# Patient Record
Sex: FEMALE | Race: BLACK OR AFRICAN AMERICAN | NOT HISPANIC OR LATINO | Employment: OTHER | URBAN - METROPOLITAN AREA
[De-identification: names, ages, dates, MRNs, and addresses within clinical notes are randomized per-mention and may not be internally consistent; named-entity substitution may affect disease eponyms.]

---

## 2017-04-12 ENCOUNTER — TELEPHONE (OUTPATIENT)
Dept: INTERNAL MEDICINE | Facility: CLINIC | Age: 77
End: 2017-04-12

## 2017-04-12 NOTE — TELEPHONE ENCOUNTER
----- Message from Sarah Méndez sent at 4/12/2017  9:07 AM CDT -----  Contact: call  [phone]  Patient would like to get a referral.  Does the patient already have the specialty clinic appointment scheduled:    If yes, what date is the appointment scheduled:     Referral to what specialty:  Mammo   Reason (be specific):  annual  Does the patient want the referral with a specific physician:    Is this an [facility] or [facility] physician:  [facility]  Comments:

## 2017-04-24 ENCOUNTER — TELEPHONE (OUTPATIENT)
Dept: INTERNAL MEDICINE | Facility: CLINIC | Age: 77
End: 2017-04-24

## 2017-04-24 DIAGNOSIS — Z12.31 VISIT FOR SCREENING MAMMOGRAM: Primary | ICD-10-CM

## 2017-04-24 NOTE — TELEPHONE ENCOUNTER
----- Message from Anastasia Batista sent at 4/24/2017 11:24 AM CDT -----  Contact: Self/[phone]  Patient called want to check status on her order for mammogram. Please call, and advise            Thank you.

## 2017-05-03 ENCOUNTER — HOSPITAL ENCOUNTER (OUTPATIENT)
Dept: RADIOLOGY | Facility: OTHER | Age: 77
Discharge: HOME | End: 2017-05-03
Attending: INTERNAL MEDICINE
Payer: MEDICARE

## 2017-05-03 DIAGNOSIS — Z12.31 VISIT FOR SCREENING MAMMOGRAM: ICD-10-CM

## 2017-05-03 PROCEDURE — 77063 BREAST TOMOSYNTHESIS BI: CPT | Mod: 26,,, | Performed by: RADIOLOGY

## 2017-05-03 PROCEDURE — 77067 SCR MAMMO BI INCL CAD: CPT | Mod: 26,,, | Performed by: RADIOLOGY

## 2017-05-03 PROCEDURE — 77067 SCR MAMMO BI INCL CAD: CPT | Mod: TC

## 2017-08-09 ENCOUNTER — OFFICE VISIT (OUTPATIENT)
Dept: INFECTIOUS DISEASES | Facility: CLINIC | Age: 77
End: 2017-08-09
Payer: MEDICARE

## 2017-08-09 VITALS
TEMPERATURE: 98 F | BODY MASS INDEX: 32.3 KG/M2 | HEART RATE: 75 BPM | WEIGHT: 182.31 LBS | HEIGHT: 63 IN | DIASTOLIC BLOOD PRESSURE: 53 MMHG | SYSTOLIC BLOOD PRESSURE: 131 MMHG

## 2017-08-09 DIAGNOSIS — Z23 IMMUNIZATION DUE: ICD-10-CM

## 2017-08-09 DIAGNOSIS — Z71.84 TRAVEL ADVICE ENCOUNTER: Primary | ICD-10-CM

## 2017-08-09 PROCEDURE — 99402 PREV MED CNSL INDIV APPRX 30: CPT | Mod: S$GLB,,, | Performed by: INTERNAL MEDICINE

## 2017-08-09 PROCEDURE — 99999 PR PBB SHADOW E&M-EST. PATIENT-LVL III: CPT | Mod: PBBFAC,,, | Performed by: INTERNAL MEDICINE

## 2017-08-09 RX ORDER — ATOVAQUONE AND PROGUANIL HYDROCHLORIDE 250; 100 MG/1; MG/1
TABLET, FILM COATED ORAL
Qty: 26 TABLET | Refills: 0 | Status: SHIPPED | OUTPATIENT
Start: 2017-08-09 | End: 2018-10-10

## 2017-08-09 RX ORDER — AZITHROMYCIN 500 MG/1
TABLET, FILM COATED ORAL
Qty: 2 TABLET | Refills: 0 | Status: SHIPPED | OUTPATIENT
Start: 2017-08-09 | End: 2017-10-11

## 2017-08-09 NOTE — PROGRESS NOTES
Subjective:      Chief Complaint:   Chief Complaint   Patient presents with    Travel Consult     History of Present Illness    Patient  77 y.o. female who presents today for routine pretravel consultation.  The patient reports no past medical history.  The patient reports the following medication allergies; penicillin.  The patient reports the following food allergies; none.  The patient will be traveling with a friend.  They will travel to [facility] first for 1 week.  Then they will go to [facility], then to [place].  They will be doing a lot of traveling by bus.  The trip will last 17 days.  They will leave in September 21.  The patient will be lodging at hotels.  The patient reports that they received all their childhood vaccinations.  The patient reports receipt of the following travel related vaccinations; none.  The purpose of this trip is for vacation.    ROS    Objective:   Physical Exam   Assessment:     Pre-Travel clinic assessment    Plan:   Patient specific risks:      Patient was advised to take her home medications with her.    Destination specific risks:      -Infectious Disease risks:       Mosquito Borne pathogens:  Reviewed basic mosquito avoidance precautions including wearing long sleeve clothing and insect repellant.  Malarone was prescribed for malaria prophylaxis.  I counseled her about the risks of yellow fever vaccination given her advanced age.  After reviewing all side effects, she signed the consent form and will receive the vaccine tomorrow.     Food Borne pathogens:  Reviewed basic hand, food and water sanitation precautions.  Patient instructed to take hand  on their trip.  Will give typhoid IM and hepatitis A #1 vaccination.  Azithromycin was prescribed for use as needed for severe diarrhea.     Routine:  Will give her menactra and part of [place] falls in the meningitis belt.  Will update her tetanus vaccination.  She is up to date on pneumococcal and shingles  vaccination.    -Environmental risks:     Precautions to minimize risk/exposure to crime and motor vehicle accidents were reviewed with the patient.

## 2017-08-10 ENCOUNTER — TELEPHONE (OUTPATIENT)
Dept: INTERNAL MEDICINE | Facility: CLINIC | Age: 77
End: 2017-08-10

## 2017-08-10 ENCOUNTER — CLINICAL SUPPORT (OUTPATIENT)
Dept: INFECTIOUS DISEASES | Facility: CLINIC | Age: 77
End: 2017-08-10
Payer: MEDICARE

## 2017-08-10 DIAGNOSIS — Z23 IMMUNIZATION DUE: ICD-10-CM

## 2017-08-10 DIAGNOSIS — Z71.84 TRAVEL ADVICE ENCOUNTER: ICD-10-CM

## 2017-08-10 PROCEDURE — 90691 TYPHOID VACCINE IM: CPT | Mod: S$GLB,,, | Performed by: INTERNAL MEDICINE

## 2017-08-10 PROCEDURE — 90734 MENACWYD/MENACWYCRM VACC IM: CPT | Mod: S$GLB,,, | Performed by: INTERNAL MEDICINE

## 2017-08-10 PROCEDURE — 90714 TD VACC NO PRESV 7 YRS+ IM: CPT | Mod: S$GLB,,, | Performed by: INTERNAL MEDICINE

## 2017-08-10 PROCEDURE — 90632 HEPA VACCINE ADULT IM: CPT | Mod: S$GLB,,, | Performed by: INTERNAL MEDICINE

## 2017-08-10 PROCEDURE — 90717 YELLOW FEVER VACCINE SUBQ: CPT | Mod: S$GLB,,, | Performed by: INTERNAL MEDICINE

## 2017-08-10 PROCEDURE — 90472 IMMUNIZATION ADMIN EACH ADD: CPT | Mod: S$GLB,,, | Performed by: INTERNAL MEDICINE

## 2017-08-10 PROCEDURE — 90471 IMMUNIZATION ADMIN: CPT | Mod: S$GLB,,, | Performed by: INTERNAL MEDICINE

## 2017-08-12 RX ORDER — TRIAMTERENE AND HYDROCHLOROTHIAZIDE 37.5; 25 MG/1; MG/1
1 CAPSULE ORAL DAILY
Qty: 90 CAPSULE | Refills: 3 | Status: SHIPPED | OUTPATIENT
Start: 2017-08-12 | End: 2018-09-05 | Stop reason: SDUPTHER

## 2017-08-12 RX ORDER — LOSARTAN POTASSIUM 25 MG/1
25 TABLET ORAL DAILY
Qty: 90 TABLET | Refills: 3 | Status: SHIPPED | OUTPATIENT
Start: 2017-08-12 | End: 2018-09-04 | Stop reason: SDUPTHER

## 2017-10-11 ENCOUNTER — OFFICE VISIT (OUTPATIENT)
Dept: INTERNAL MEDICINE | Facility: CLINIC | Age: 77
End: 2017-10-11
Payer: MEDICARE

## 2017-10-11 ENCOUNTER — IMMUNIZATION (OUTPATIENT)
Dept: INTERNAL MEDICINE | Facility: CLINIC | Age: 77
End: 2017-10-11
Payer: MEDICARE

## 2017-10-11 VITALS
DIASTOLIC BLOOD PRESSURE: 72 MMHG | HEIGHT: 63 IN | SYSTOLIC BLOOD PRESSURE: 137 MMHG | HEART RATE: 66 BPM | BODY MASS INDEX: 31.89 KG/M2 | WEIGHT: 180 LBS

## 2017-10-11 DIAGNOSIS — I10 ESSENTIAL HYPERTENSION: Primary | ICD-10-CM

## 2017-10-11 DIAGNOSIS — R35.0 URINARY FREQUENCY: ICD-10-CM

## 2017-10-11 DIAGNOSIS — E78.2 HYPERLIPIDEMIA, MIXED: ICD-10-CM

## 2017-10-11 DIAGNOSIS — Z12.11 SCREENING FOR COLON CANCER: ICD-10-CM

## 2017-10-11 DIAGNOSIS — R73.9 HYPERGLYCEMIA: ICD-10-CM

## 2017-10-11 DIAGNOSIS — E55.9 VITAMIN D DEFICIENCY: ICD-10-CM

## 2017-10-11 DIAGNOSIS — Z86.39 HISTORY OF VITAMIN D DEFICIENCY: ICD-10-CM

## 2017-10-11 LAB
BILIRUB UR QL STRIP: NEGATIVE
CLARITY UR REFRACT.AUTO: CLEAR
COLOR UR AUTO: NORMAL
CREAT UR-MCNC: 26 MG/DL
GLUCOSE UR QL STRIP: NEGATIVE
HGB UR QL STRIP: NEGATIVE
KETONES UR QL STRIP: NEGATIVE
LEUKOCYTE ESTERASE UR QL STRIP: NEGATIVE
MICROALBUMIN UR DL<=1MG/L-MCNC: <2.5 UG/ML
MICROALBUMIN/CREATININE RATIO: NORMAL UG/MG
NITRITE UR QL STRIP: NEGATIVE
PH UR STRIP: 8 [PH] (ref 5–8)
PROT UR QL STRIP: NEGATIVE
SP GR UR STRIP: 1 (ref 1–1.03)
URN SPEC COLLECT METH UR: NORMAL
UROBILINOGEN UR STRIP-ACNC: NEGATIVE EU/DL

## 2017-10-11 PROCEDURE — 82570 ASSAY OF URINE CREATININE: CPT

## 2017-10-11 PROCEDURE — 87086 URINE CULTURE/COLONY COUNT: CPT

## 2017-10-11 PROCEDURE — G0009 ADMIN PNEUMOCOCCAL VACCINE: HCPCS | Mod: S$GLB,,, | Performed by: INTERNAL MEDICINE

## 2017-10-11 PROCEDURE — 99214 OFFICE O/P EST MOD 30 MIN: CPT | Mod: S$GLB,,, | Performed by: INTERNAL MEDICINE

## 2017-10-11 PROCEDURE — 81003 URINALYSIS AUTO W/O SCOPE: CPT

## 2017-10-11 PROCEDURE — 99999 PR PBB SHADOW E&M-EST. PATIENT-LVL III: CPT | Mod: PBBFAC,,, | Performed by: INTERNAL MEDICINE

## 2017-10-11 PROCEDURE — 90732 PPSV23 VACC 2 YRS+ SUBQ/IM: CPT | Mod: S$GLB,,, | Performed by: INTERNAL MEDICINE

## 2017-10-11 PROCEDURE — 99499 UNLISTED E&M SERVICE: CPT | Mod: S$GLB,,, | Performed by: INTERNAL MEDICINE

## 2017-10-11 PROCEDURE — 90662 IIV NO PRSV INCREASED AG IM: CPT | Mod: S$GLB,,, | Performed by: INTERNAL MEDICINE

## 2017-10-11 PROCEDURE — G0008 ADMIN INFLUENZA VIRUS VAC: HCPCS | Mod: S$GLB,,, | Performed by: INTERNAL MEDICINE

## 2017-10-11 RX ORDER — ALENDRONATE SODIUM 70 MG/1
TABLET ORAL
Qty: 12 TABLET | Refills: 3 | Status: SHIPPED | OUTPATIENT
Start: 2017-10-11 | End: 2018-08-29

## 2017-10-12 LAB — BACTERIA UR CULT: NO GROWTH

## 2017-10-17 ENCOUNTER — LAB VISIT (OUTPATIENT)
Dept: LAB | Facility: HOSPITAL | Age: 77
End: 2017-10-17
Attending: INTERNAL MEDICINE
Payer: MEDICARE

## 2017-10-17 DIAGNOSIS — R73.9 HYPERGLYCEMIA: ICD-10-CM

## 2017-10-17 DIAGNOSIS — I10 ESSENTIAL HYPERTENSION: ICD-10-CM

## 2017-10-17 DIAGNOSIS — Z86.39 HISTORY OF VITAMIN D DEFICIENCY: ICD-10-CM

## 2017-10-17 DIAGNOSIS — E55.9 VITAMIN D DEFICIENCY: ICD-10-CM

## 2017-10-17 DIAGNOSIS — E78.2 HYPERLIPIDEMIA, MIXED: ICD-10-CM

## 2017-10-17 LAB
25(OH)D3+25(OH)D2 SERPL-MCNC: 55 NG/ML
ALBUMIN SERPL BCP-MCNC: 3.5 G/DL
ALBUMIN SERPL BCP-MCNC: 3.5 G/DL
ALP SERPL-CCNC: 83 U/L
ALP SERPL-CCNC: 83 U/L
ALT SERPL W/O P-5'-P-CCNC: 13 U/L
ALT SERPL W/O P-5'-P-CCNC: 13 U/L
ANION GAP SERPL CALC-SCNC: 14 MMOL/L
ANION GAP SERPL CALC-SCNC: 14 MMOL/L
AST SERPL-CCNC: 17 U/L
AST SERPL-CCNC: 17 U/L
BASOPHILS # BLD AUTO: 0.07 K/UL
BASOPHILS NFR BLD: 1.3 %
BILIRUB SERPL-MCNC: 0.6 MG/DL
BILIRUB SERPL-MCNC: 0.6 MG/DL
BUN SERPL-MCNC: 12 MG/DL
BUN SERPL-MCNC: 12 MG/DL
CALCIUM SERPL-MCNC: 10.6 MG/DL
CALCIUM SERPL-MCNC: 10.6 MG/DL
CHLORIDE SERPL-SCNC: 102 MMOL/L
CHLORIDE SERPL-SCNC: 102 MMOL/L
CHOLEST SERPL-MCNC: 193 MG/DL
CHOLEST SERPL-MCNC: 193 MG/DL
CHOLEST/HDLC SERPL: 3.3 {RATIO}
CHOLEST/HDLC SERPL: 3.3 {RATIO}
CO2 SERPL-SCNC: 25 MMOL/L
CO2 SERPL-SCNC: 25 MMOL/L
CREAT SERPL-MCNC: 1.1 MG/DL
CREAT SERPL-MCNC: 1.1 MG/DL
DIFFERENTIAL METHOD: ABNORMAL
EOSINOPHIL # BLD AUTO: 0.1 K/UL
EOSINOPHIL NFR BLD: 2.3 %
ERYTHROCYTE [DISTWIDTH] IN BLOOD BY AUTOMATED COUNT: 12.9 %
EST. GFR  (AFRICAN AMERICAN): 56.4 ML/MIN/1.73 M^2
EST. GFR  (AFRICAN AMERICAN): 56.4 ML/MIN/1.73 M^2
EST. GFR  (NON AFRICAN AMERICAN): 48.9 ML/MIN/1.73 M^2
EST. GFR  (NON AFRICAN AMERICAN): 48.9 ML/MIN/1.73 M^2
ESTIMATED AVG GLUCOSE: 134 MG/DL
GLUCOSE SERPL-MCNC: 85 MG/DL
GLUCOSE SERPL-MCNC: 85 MG/DL
HBA1C MFR BLD HPLC: 6.3 %
HCT VFR BLD AUTO: 42.3 %
HDLC SERPL-MCNC: 59 MG/DL
HDLC SERPL-MCNC: 59 MG/DL
HDLC SERPL: 30.6 %
HDLC SERPL: 30.6 %
HGB BLD-MCNC: 13.5 G/DL
IMM GRANULOCYTES # BLD AUTO: 0.04 K/UL
IMM GRANULOCYTES NFR BLD AUTO: 0.7 %
LDLC SERPL CALC-MCNC: 104 MG/DL
LDLC SERPL CALC-MCNC: 104 MG/DL
LYMPHOCYTES # BLD AUTO: 1.7 K/UL
LYMPHOCYTES NFR BLD: 30.6 %
MCH RBC QN AUTO: 29 PG
MCHC RBC AUTO-ENTMCNC: 31.9 G/DL
MCV RBC AUTO: 91 FL
MONOCYTES # BLD AUTO: 0.6 K/UL
MONOCYTES NFR BLD: 9.9 %
NEUTROPHILS # BLD AUTO: 3.1 K/UL
NEUTROPHILS NFR BLD: 55.2 %
NONHDLC SERPL-MCNC: 134 MG/DL
NONHDLC SERPL-MCNC: 134 MG/DL
NRBC BLD-RTO: 0 /100 WBC
PLATELET # BLD AUTO: 288 K/UL
PMV BLD AUTO: 9.5 FL
POTASSIUM SERPL-SCNC: 4.6 MMOL/L
POTASSIUM SERPL-SCNC: 4.6 MMOL/L
PROT SERPL-MCNC: 7.9 G/DL
PROT SERPL-MCNC: 7.9 G/DL
RBC # BLD AUTO: 4.65 M/UL
SODIUM SERPL-SCNC: 141 MMOL/L
SODIUM SERPL-SCNC: 141 MMOL/L
TRIGL SERPL-MCNC: 150 MG/DL
TRIGL SERPL-MCNC: 150 MG/DL
TSH SERPL DL<=0.005 MIU/L-ACNC: 1.17 UIU/ML
TSH SERPL DL<=0.005 MIU/L-ACNC: 1.17 UIU/ML
WBC # BLD AUTO: 5.58 K/UL

## 2017-10-17 PROCEDURE — 80061 LIPID PANEL: CPT

## 2017-10-17 PROCEDURE — 84443 ASSAY THYROID STIM HORMONE: CPT

## 2017-10-17 PROCEDURE — 85025 COMPLETE CBC W/AUTO DIFF WBC: CPT

## 2017-10-17 PROCEDURE — 80053 COMPREHEN METABOLIC PANEL: CPT

## 2017-10-17 PROCEDURE — 82306 VITAMIN D 25 HYDROXY: CPT

## 2017-10-17 PROCEDURE — 36415 COLL VENOUS BLD VENIPUNCTURE: CPT

## 2017-10-17 PROCEDURE — 83036 HEMOGLOBIN GLYCOSYLATED A1C: CPT

## 2017-10-20 NOTE — PROGRESS NOTES
Subjective:       Patient ID: Kelley Cruz is a 77 y.o. female.    Chief Complaint: Follow-up    HPIPt just returned from [place] - she had a great trip and no illness.  No Cp or SOB.  She is watching her weight and has been active.   Review of Systems   Respiratory: Negative for shortness of breath (PND or orthopnea).    Cardiovascular: Negative for chest pain (arm pain or jaw pain).   Gastrointestinal: Negative for abdominal pain, diarrhea, nausea and vomiting.   Genitourinary: Negative for dysuria.   Neurological: Negative for seizures, syncope and headaches.       Objective:      Physical Exam   Constitutional: She is oriented to person, place, and time. She appears well-developed and well-nourished. No distress.   HENT:   Head: Normocephalic.   Mouth/Throat: Oropharynx is clear and moist.   Neck: Neck supple. No JVD present. No thyromegaly present.   Cardiovascular: Normal rate, regular rhythm, normal heart sounds and intact distal pulses.  Exam reveals no gallop and no friction rub.    No murmur heard.  Pulmonary/Chest: Effort normal and breath sounds normal. She has no wheezes. She has no rales.   Abdominal: Soft. Bowel sounds are normal. She exhibits no distension and no mass. There is no tenderness. There is no rebound and no guarding.   Genitourinary: Rectal exam shows guaiac negative stool.   Musculoskeletal: She exhibits no edema.   Lymphadenopathy:     She has no cervical adenopathy.   Neurological: She is alert and oriented to person, place, and time. She has normal reflexes.   Skin: Skin is warm and dry.   Psychiatric: She has a normal mood and affect. Her behavior is normal. Judgment and thought content normal.       Assessment:       1. Essential hypertension    2. Hyperlipidemia, mixed    3. Hyperglycemia    4. History of vitamin D deficiency    5. Urinary frequency    6. Vitamin D deficiency     7. Screening for colon cancer        Plan:   Essential hypertension  -     CBC auto  differential; Future; Expected date: 10/11/2017  -     Comprehensive metabolic panel; Future; Expected date: 10/11/2017  -     TSH; Future; Expected date: 10/11/2017  -     Urinalysis  -     Microalbumin/creatinine urine ratio  Controlled - continue current meds    Hyperlipidemia, mixed  -     Lipid panel; Future; Expected date: 10/11/2017    Hyperglycemia  -     Hemoglobin A1c; Future; Expected date: 10/11/2017    History of vitamin D deficiency  -     Vitamin D; Future; Expected date: 10/11/2017    Urinary frequency  -     Urine culture    Vitamin D deficiency   -     Vitamin D; Future; Expected date: 10/11/2017    Screening for colon cancer  -     Fecal Immunochemical Test (iFOBT); Future; Expected date: 10/11/2017    Other orders  -     Pneumococcal Polysaccharide Vaccine (23 Valent) (SQ/IM)  -     alendronate (FOSAMAX) 70 MG tablet; Take 1 tablet once weekly in the morning with a full glass of water on an empty stomach. Do not consume food or lie down for at least 30 minutes afterwards.  Dispense: 12 tablet; Refill: 3

## 2017-10-25 ENCOUNTER — LAB VISIT (OUTPATIENT)
Dept: LAB | Facility: HOSPITAL | Age: 77
End: 2017-10-25
Attending: INTERNAL MEDICINE
Payer: MEDICARE

## 2017-10-25 DIAGNOSIS — Z12.11 SCREENING FOR COLON CANCER: ICD-10-CM

## 2017-10-25 LAB — HEMOCCULT STL QL IA: NEGATIVE

## 2017-10-25 PROCEDURE — 82274 ASSAY TEST FOR BLOOD FECAL: CPT

## 2017-10-26 ENCOUNTER — TELEPHONE (OUTPATIENT)
Dept: INTERNAL MEDICINE | Facility: CLINIC | Age: 77
End: 2017-10-26

## 2017-10-26 NOTE — TELEPHONE ENCOUNTER
----- Message from Yina Roth MD sent at 10/26/2017  9:39 AM CDT -----  Please inform pt of no blood in the stool -  thanks.

## 2018-04-10 ENCOUNTER — PES CALL (OUTPATIENT)
Dept: ADMINISTRATIVE | Facility: CLINIC | Age: 78
End: 2018-04-10

## 2018-05-17 ENCOUNTER — TELEPHONE (OUTPATIENT)
Dept: INTERNAL MEDICINE | Facility: CLINIC | Age: 78
End: 2018-05-17

## 2018-05-17 DIAGNOSIS — Z12.31 SCREENING MAMMOGRAM, ENCOUNTER FOR: Primary | ICD-10-CM

## 2018-05-17 NOTE — TELEPHONE ENCOUNTER
----- Message from Carlos Guevara sent at 5/17/2018  8:09 AM CDT -----  Contact: Patient [phone]  Patient would like to schedule their annual mammogram appointment, please enter the order and contact the patient to schedule.     Thank you

## 2018-05-30 ENCOUNTER — HOSPITAL ENCOUNTER (OUTPATIENT)
Dept: RADIOLOGY | Facility: OTHER | Age: 78
Discharge: HOME | End: 2018-05-30
Attending: INTERNAL MEDICINE
Payer: MEDICARE

## 2018-05-30 VITALS — WEIGHT: 180 LBS | HEIGHT: 63 IN | BODY MASS INDEX: 31.89 KG/M2

## 2018-05-30 DIAGNOSIS — Z12.31 SCREENING MAMMOGRAM, ENCOUNTER FOR: ICD-10-CM

## 2018-05-30 PROCEDURE — 77067 SCR MAMMO BI INCL CAD: CPT | Mod: TC

## 2018-05-30 PROCEDURE — 77067 SCR MAMMO BI INCL CAD: CPT | Mod: 26,,, | Performed by: RADIOLOGY

## 2018-05-30 PROCEDURE — 77063 BREAST TOMOSYNTHESIS BI: CPT | Mod: 26,,, | Performed by: RADIOLOGY

## 2018-06-18 ENCOUNTER — PES CALL (OUTPATIENT)
Dept: ADMINISTRATIVE | Facility: CLINIC | Age: 78
End: 2018-06-18

## 2018-07-13 ENCOUNTER — PES CALL (OUTPATIENT)
Dept: ADMINISTRATIVE | Facility: CLINIC | Age: 78
End: 2018-07-13

## 2018-08-29 ENCOUNTER — OFFICE VISIT (OUTPATIENT)
Dept: OPTOMETRY | Facility: CLINIC | Age: 78
End: 2018-08-29

## 2018-08-29 ENCOUNTER — OFFICE VISIT (OUTPATIENT)
Dept: OPTOMETRY | Facility: CLINIC | Age: 78
End: 2018-08-29
Payer: MEDICARE

## 2018-08-29 DIAGNOSIS — H43.393 VITREOUS FLOATERS OF BOTH EYES: ICD-10-CM

## 2018-08-29 DIAGNOSIS — H52.13 MYOPIA OF BOTH EYES WITH ASTIGMATISM AND PRESBYOPIA: ICD-10-CM

## 2018-08-29 DIAGNOSIS — H52.203 MYOPIA OF BOTH EYES WITH ASTIGMATISM AND PRESBYOPIA: ICD-10-CM

## 2018-08-29 DIAGNOSIS — H52.4 MYOPIA OF BOTH EYES WITH ASTIGMATISM AND PRESBYOPIA: ICD-10-CM

## 2018-08-29 DIAGNOSIS — Z46.0 ENCOUNTER FOR FITTING OR ADJUSTMENT OF SPECTACLES OR CONTACT LENSES: Primary | ICD-10-CM

## 2018-08-29 DIAGNOSIS — Z13.5 SCREENING FOR EYE CONDITION: ICD-10-CM

## 2018-08-29 DIAGNOSIS — H25.13 NUCLEAR SCLEROSIS OF BOTH EYES: Primary | ICD-10-CM

## 2018-08-29 PROCEDURE — 92015 DETERMINE REFRACTIVE STATE: CPT | Mod: S$GLB,,, | Performed by: OPTOMETRIST

## 2018-08-29 PROCEDURE — 92014 COMPRE OPH EXAM EST PT 1/>: CPT | Mod: S$GLB,,, | Performed by: OPTOMETRIST

## 2018-08-29 PROCEDURE — 99499 UNLISTED E&M SERVICE: CPT | Mod: S$GLB,,, | Performed by: OPTOMETRIST

## 2018-08-29 PROCEDURE — 99999 PR PBB SHADOW E&M-EST. PATIENT-LVL III: CPT | Mod: PBBFAC,,, | Performed by: OPTOMETRIST

## 2018-08-29 NOTE — PATIENT INSTRUCTIONS
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  No need for cataract surgery.  Monitor.    Slight asymmetry in optic nerve cup-to-disc ratio, greater in the left eye, as noted previously.  C/D ratio stable in each eye, and intraocular pressure normal in each eye.  Monitor.     Few vitreous floaters in each eye.  No evidence of retinal etiology. Monitor.    Myopia with astigmatism in each eye, and presbyopia consistent with age.  New spectacle lens Rx issued for use in lieu of SCLs.    Wears Multifocal soft CLs.    Good contact lens fit in each eye.  Power of right lens fine as is.  Showing need for only minimal power change in the left lens per over refraction done today.     New CL Rx issued.    Recheck in one year - or prior if any problems in the interim.

## 2018-08-29 NOTE — PROGRESS NOTES
HPI     DLS:  5/5/16    Pt here for check of ocular health.  Pt without visual complaints today   OU.     Patient's age: 78 y.o.  Occupation: Retired  Approximate date of last eye examination:  5/5/16  Name of last eye doctor seen: Dr Barth  City/State: [place]  Wears glasses? yes     If yes, wears  Full-time or part-time?  Part time  Present glasses are: Bifocal, SV Distance, SV Reading?  SV reading   Approximate age of present glasses:  unsure   Got new glasses following last exam, or subsequently?:  unsure   Any problem with [facility] with glasses?  no  Wears CLs?:  yes           If yes:              Type of CL worn:  unsure              Wears full-time or part-time:  Full time               Sleeps with contact lenses:  no               CL Solution used:  Dagmar               How often replace CLs:  Every 30 days              Any problem with [facility] with CLs?  no              Has patient read and signed the contact lens fee information   document? no  Headaches?  no  Eye pain/discomfort?  no                                                                                     Flashes?  no  Floaters?  no  Diplopia/Double vision?  no  Patient's Ocular History:         Any eye surgeries? no         Any eye injury?  no         Any treatment for eye disease?  no  Family history of eye disease?  Mother-glaucoma  Significant patient medical history:         1. Diabetes?  no       If yes, IDDM or NIDDM? n/a   2. HBP?  no                 ! OTC eyedrops currently using:  no   ! Prescription eye meds currently using:  no   ! Any history of allergy/adverse reaction to any eye meds used   previously?  no    ! Any history of allergy/adverse reaction to eyedrops used during prior   eye exam(s)? no    ! Any history of allergy/adverse reaction to Novacaine or similar meds?   no   ! Any history of allergy/adverse reaction to Epinephrine or similar meds?   no    ! Patient okay with use of anesthetic eyedrops to check eye pressure?    yes         ! Patient okay with use of eyedrops to dilate pupils today?  yes   !  Allergies/Medications/Medical History/Family History reviewed today?    yes      PD =     Desired reading distance =    Approx computer distance =                                                                 Last edited by Sandee Montes MA on 8/29/2018  4:20 PM.   (History)            Assessment /Plan     For exam results, see Encounter Report.    1. Nuclear sclerosis of both eyes     2. Vitreous floaters of both eyes     3. Screening for eye condition     4. 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  No need for cataract surgery.  Monitor.    Slight asymmetry in optic nerve cup-to-disc ratio, greater in the left eye, as noted previously.  C/D ratio stable in each eye, and intraocular pressure normal in each eye.  Monitor.     Few vitreous floaters in each eye.  No evidence of retinal etiology. Monitor.    Myopia with astigmatism in each eye, and presbyopia consistent with age.  New spectacle lens Rx issued for use in lieu of SCLs.    Wears Multifocal soft CLs.    Good contact lens fit in each eye.  Power of right lens fine as is.  Showing need for only minimal power change in the left lens per over refraction done today.     New CL Rx issued.    Recheck in one year - or prior if any problems in the interim.

## 2018-08-29 NOTE — PROGRESS NOTES
HPI     Contact Lens Follow Up      Additional comments: Patient in today for contact lens follow-up with   general eye examination.  Refer to additional patient encounter notes   dated 08/29/2018.                Comments     Patient in today for contact lens follow-up with general eye examination.    Refer to additional patient encounter notes dated 08/29/2018            Last edited by Gurwinder Barth, OD on 8/29/2018  4:29 PM. (History)            Assessment /Plan     For exam results, see Encounter Report.    1. 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  No need for cataract surgery.  Monitor.    Slight asymmetry in optic nerve cup-to-disc ratio, greater in the left eye, as noted previously.  C/D ratio stable in each eye, and intraocular pressure normal in each eye.  Monitor.     Few vitreous floaters in each eye.  No evidence of retinal etiology. Monitor.    Myopia with astigmatism in each eye, and presbyopia consistent with age.  New spectacle lens Rx issued for use in lieu of SCLs.    Wears Multifocal soft CLs.    Good contact lens fit in each eye.  Power of right lens fine as is.  Showing need for only minimal power change in the left lens per over refraction done today.     New CL Rx issued.    Recheck in one year - or prior if any problems in the interim.

## 2018-09-04 RX ORDER — LOSARTAN POTASSIUM 25 MG/1
25 TABLET ORAL DAILY
Qty: 90 TABLET | Refills: 1 | Status: SHIPPED | OUTPATIENT
Start: 2018-09-04 | End: 2019-02-25 | Stop reason: SDUPTHER

## 2018-09-05 RX ORDER — TRIAMTERENE AND HYDROCHLOROTHIAZIDE 37.5; 25 MG/1; MG/1
1 CAPSULE ORAL DAILY
Qty: 90 CAPSULE | Refills: 2 | Status: SHIPPED | OUTPATIENT
Start: 2018-09-05 | End: 2019-03-08 | Stop reason: SDUPTHER

## 2018-09-05 NOTE — TELEPHONE ENCOUNTER
Left a message for the patient to call the office back.   To confirm annual physical appointment on 10/08/18  At 11:30am    Please Advise  Thank You

## 2018-09-26 ENCOUNTER — TELEPHONE (OUTPATIENT)
Dept: INTERNAL MEDICINE | Facility: CLINIC | Age: 78
End: 2018-09-26

## 2018-10-10 ENCOUNTER — IMMUNIZATION (OUTPATIENT)
Dept: INTERNAL MEDICINE | Facility: CLINIC | Age: 78
End: 2018-10-10
Payer: MEDICARE

## 2018-10-10 ENCOUNTER — LAB VISIT (OUTPATIENT)
Dept: LAB | Facility: HOSPITAL | Age: 78
End: 2018-10-10
Attending: INTERNAL MEDICINE
Payer: MEDICARE

## 2018-10-10 ENCOUNTER — OFFICE VISIT (OUTPATIENT)
Dept: INTERNAL MEDICINE | Facility: CLINIC | Age: 78
End: 2018-10-10
Payer: MEDICARE

## 2018-10-10 VITALS
HEIGHT: 63 IN | OXYGEN SATURATION: 95 % | BODY MASS INDEX: 32.03 KG/M2 | DIASTOLIC BLOOD PRESSURE: 80 MMHG | HEART RATE: 72 BPM | TEMPERATURE: 98 F | SYSTOLIC BLOOD PRESSURE: 122 MMHG | WEIGHT: 180.75 LBS

## 2018-10-10 DIAGNOSIS — R73.9 HYPERGLYCEMIA: ICD-10-CM

## 2018-10-10 DIAGNOSIS — Z12.11 ENCOUNTER FOR SCREENING COLONOSCOPY: ICD-10-CM

## 2018-10-10 DIAGNOSIS — M81.0 OSTEOPOROSIS, UNSPECIFIED OSTEOPOROSIS TYPE, UNSPECIFIED PATHOLOGICAL FRACTURE PRESENCE: ICD-10-CM

## 2018-10-10 DIAGNOSIS — I10 ESSENTIAL HYPERTENSION: Primary | ICD-10-CM

## 2018-10-10 DIAGNOSIS — I10 ESSENTIAL HYPERTENSION: ICD-10-CM

## 2018-10-10 DIAGNOSIS — E55.9 MILD VITAMIN D DEFICIENCY: ICD-10-CM

## 2018-10-10 DIAGNOSIS — E78.5 HYPERLIPIDEMIA, UNSPECIFIED HYPERLIPIDEMIA TYPE: ICD-10-CM

## 2018-10-10 LAB
25(OH)D3+25(OH)D2 SERPL-MCNC: 34 NG/ML
ALBUMIN SERPL BCP-MCNC: 3.8 G/DL
ALP SERPL-CCNC: 73 U/L
ALT SERPL W/O P-5'-P-CCNC: 14 U/L
ANION GAP SERPL CALC-SCNC: 10 MMOL/L
AST SERPL-CCNC: 17 U/L
BASOPHILS # BLD AUTO: 0.06 K/UL
BASOPHILS NFR BLD: 0.9 %
BILIRUB SERPL-MCNC: 0.6 MG/DL
BUN SERPL-MCNC: 13 MG/DL
CALCIUM SERPL-MCNC: 10.4 MG/DL
CHLORIDE SERPL-SCNC: 101 MMOL/L
CHOLEST SERPL-MCNC: 208 MG/DL
CHOLEST/HDLC SERPL: 2.8 {RATIO}
CO2 SERPL-SCNC: 28 MMOL/L
CREAT SERPL-MCNC: 0.8 MG/DL
DIFFERENTIAL METHOD: NORMAL
EOSINOPHIL # BLD AUTO: 0.1 K/UL
EOSINOPHIL NFR BLD: 1.7 %
ERYTHROCYTE [DISTWIDTH] IN BLOOD BY AUTOMATED COUNT: 13.2 %
EST. GFR  (AFRICAN AMERICAN): >60 ML/MIN/1.73 M^2
EST. GFR  (NON AFRICAN AMERICAN): >60 ML/MIN/1.73 M^2
GLUCOSE SERPL-MCNC: 70 MG/DL
HCT VFR BLD AUTO: 42.8 %
HDLC SERPL-MCNC: 73 MG/DL
HDLC SERPL: 35.1 %
HGB BLD-MCNC: 13.8 G/DL
IMM GRANULOCYTES # BLD AUTO: 0.02 K/UL
IMM GRANULOCYTES NFR BLD AUTO: 0.3 %
LDLC SERPL CALC-MCNC: 107 MG/DL
LYMPHOCYTES # BLD AUTO: 2 K/UL
LYMPHOCYTES NFR BLD: 28.8 %
MCH RBC QN AUTO: 30.9 PG
MCHC RBC AUTO-ENTMCNC: 32.2 G/DL
MCV RBC AUTO: 96 FL
MONOCYTES # BLD AUTO: 0.8 K/UL
MONOCYTES NFR BLD: 10.8 %
NEUTROPHILS # BLD AUTO: 4 K/UL
NEUTROPHILS NFR BLD: 57.5 %
NONHDLC SERPL-MCNC: 135 MG/DL
NRBC BLD-RTO: 0 /100 WBC
PLATELET # BLD AUTO: 283 K/UL
PMV BLD AUTO: 10.5 FL
POTASSIUM SERPL-SCNC: 3.8 MMOL/L
PROT SERPL-MCNC: 7.7 G/DL
RBC # BLD AUTO: 4.47 M/UL
SODIUM SERPL-SCNC: 139 MMOL/L
TRIGL SERPL-MCNC: 140 MG/DL
TSH SERPL DL<=0.005 MIU/L-ACNC: 1.05 UIU/ML
WBC # BLD AUTO: 7.02 K/UL

## 2018-10-10 PROCEDURE — 1101F PT FALLS ASSESS-DOCD LE1/YR: CPT | Mod: CPTII,,, | Performed by: INTERNAL MEDICINE

## 2018-10-10 PROCEDURE — 83036 HEMOGLOBIN GLYCOSYLATED A1C: CPT

## 2018-10-10 PROCEDURE — 36415 COLL VENOUS BLD VENIPUNCTURE: CPT | Mod: PO

## 2018-10-10 PROCEDURE — 3079F DIAST BP 80-89 MM HG: CPT | Mod: CPTII,,, | Performed by: INTERNAL MEDICINE

## 2018-10-10 PROCEDURE — 81001 URINALYSIS AUTO W/SCOPE: CPT

## 2018-10-10 PROCEDURE — 99999 PR PBB SHADOW E&M-EST. PATIENT-LVL IV: CPT | Mod: PBBFAC,,, | Performed by: INTERNAL MEDICINE

## 2018-10-10 PROCEDURE — 3074F SYST BP LT 130 MM HG: CPT | Mod: CPTII,,, | Performed by: INTERNAL MEDICINE

## 2018-10-10 PROCEDURE — 99214 OFFICE O/P EST MOD 30 MIN: CPT | Mod: PBBFAC,25,PO | Performed by: INTERNAL MEDICINE

## 2018-10-10 PROCEDURE — 90662 IIV NO PRSV INCREASED AG IM: CPT | Mod: PBBFAC,PO

## 2018-10-10 PROCEDURE — 80053 COMPREHEN METABOLIC PANEL: CPT

## 2018-10-10 PROCEDURE — 80061 LIPID PANEL: CPT

## 2018-10-10 PROCEDURE — 85025 COMPLETE CBC W/AUTO DIFF WBC: CPT

## 2018-10-10 PROCEDURE — 84443 ASSAY THYROID STIM HORMONE: CPT

## 2018-10-10 PROCEDURE — 82043 UR ALBUMIN QUANTITATIVE: CPT

## 2018-10-10 PROCEDURE — 82306 VITAMIN D 25 HYDROXY: CPT

## 2018-10-10 PROCEDURE — 99214 OFFICE O/P EST MOD 30 MIN: CPT | Mod: S$PBB,,, | Performed by: INTERNAL MEDICINE

## 2018-10-10 NOTE — PROGRESS NOTES
Administered high dose flu vaccine to the left deltoid(0.5ml) tolerated well, no c/o pain or redness to site, no adverse reaction pass 15min wait time.

## 2018-10-11 LAB
ALBUMIN/CREAT UR: NORMAL UG/MG
BACTERIA #/AREA URNS AUTO: NORMAL /HPF
BILIRUB UR QL STRIP: NEGATIVE
CLARITY UR REFRACT.AUTO: CLEAR
COLOR UR AUTO: YELLOW
CREAT UR-MCNC: 56 MG/DL
ESTIMATED AVG GLUCOSE: 128 MG/DL
GLUCOSE UR QL STRIP: NEGATIVE
HBA1C MFR BLD HPLC: 6.1 %
HGB UR QL STRIP: NEGATIVE
KETONES UR QL STRIP: NEGATIVE
LEUKOCYTE ESTERASE UR QL STRIP: ABNORMAL
MICROALBUMIN UR DL<=1MG/L-MCNC: <2.5 UG/ML
MICROSCOPIC COMMENT: NORMAL
NITRITE UR QL STRIP: NEGATIVE
PH UR STRIP: 7 [PH] (ref 5–8)
PROT UR QL STRIP: NEGATIVE
RBC #/AREA URNS AUTO: 1 /HPF (ref 0–4)
SP GR UR STRIP: 1.01 (ref 1–1.03)
SQUAMOUS #/AREA URNS AUTO: 1 /HPF
URN SPEC COLLECT METH UR: ABNORMAL
UROBILINOGEN UR STRIP-ACNC: NEGATIVE EU/DL
WBC #/AREA URNS AUTO: 1 /HPF (ref 0–5)

## 2018-10-19 ENCOUNTER — HOSPITAL ENCOUNTER (OUTPATIENT)
Dept: RADIOLOGY | Facility: CLINIC | Age: 78
Discharge: HOME | End: 2018-10-19
Attending: INTERNAL MEDICINE
Payer: MEDICARE

## 2018-10-19 DIAGNOSIS — M81.0 OSTEOPOROSIS, UNSPECIFIED OSTEOPOROSIS TYPE, UNSPECIFIED PATHOLOGICAL FRACTURE PRESENCE: ICD-10-CM

## 2018-10-19 PROCEDURE — 77080 DXA BONE DENSITY AXIAL: CPT | Mod: 26,,, | Performed by: INTERNAL MEDICINE

## 2018-10-19 PROCEDURE — 77080 DXA BONE DENSITY AXIAL: CPT | Mod: TC

## 2018-10-21 NOTE — PROGRESS NOTES
Subjective:       Patient ID: Kelley Cruz is a 78 y.o. female.    Chief Complaint: Annual Exam    HPIPt is feeling well - very active - walking a few miles per day. No CP or SOB.  Review of Systems   Respiratory: Negative for shortness of breath (PND or orthopnea).    Cardiovascular: Negative for chest pain (arm pain or jaw pain).   Gastrointestinal: Negative for abdominal pain, diarrhea, nausea and vomiting.   Genitourinary: Negative for dysuria.   Neurological: Negative for seizures, syncope and headaches.       Objective:      Physical Exam   Constitutional: She is oriented to person, place, and time. She appears well-developed and well-nourished. No distress.   HENT:   Head: Normocephalic.   Mouth/Throat: Oropharynx is clear and moist.   Eyes: EOM are normal. Pupils are equal, round, and reactive to light.   Neck: Neck supple. No JVD present. No thyromegaly present.   Cardiovascular: Normal rate, regular rhythm, normal heart sounds and intact distal pulses. Exam reveals no gallop and no friction rub.   No murmur heard.  Pulmonary/Chest: Effort normal and breath sounds normal. She has no wheezes. She has no rales.   Breasts: No masses, discharge or adenopathy bilaterally     Abdominal: Soft. Bowel sounds are normal. She exhibits no distension and no mass. There is no tenderness. There is no rebound and no guarding.   Genitourinary: Rectal exam shows guaiac negative stool.   Musculoskeletal: She exhibits no edema.   Lymphadenopathy:     She has no cervical adenopathy.   Neurological: She is alert and oriented to person, place, and time. She has normal reflexes.   Skin: Skin is warm and dry.   Psychiatric: She has a normal mood and affect. Her behavior is normal. Judgment and thought content normal.       Assessment:       1. Essential hypertension    2. Hyperglycemia    3. Mild vitamin D deficiency    4. Hyperlipidemia, unspecified hyperlipidemia type    5. Osteoporosis, unspecified osteoporosis type,  unspecified pathological fracture presence    6. Encounter for screening colonoscopy        Plan:   Essential hypertension  -     CBC auto differential; Future; Expected date: 10/10/2018  -     Comprehensive metabolic panel; Future; Expected date: 10/10/2018  -     TSH; Future; Expected date: 10/10/2018  -     Urinalysis  -     Microalbumin/creatinine urine ratio  Controlled - continue current meds    Hyperglycemia  -     Hemoglobin A1c; Future; Expected date: 10/10/2018    Mild vitamin D deficiency  -     Vitamin D; Future; Expected date: 10/10/2018    Hyperlipidemia, unspecified hyperlipidemia type  -     Lipid panel; Future; Expected date: 10/10/2018    Osteoporosis, unspecified osteoporosis type, unspecified pathological fracture presence  -     DXA Bone Density Spine And Hip; Future; Expected date: 10/10/2018  -     Ambulatory consult to Endocrinology    Encounter for screening colonoscopy  -     Case request GI: COLONOSCOPY    Other orders  -     Urinalysis Microscopic

## 2018-10-22 ENCOUNTER — TELEPHONE (OUTPATIENT)
Dept: INTERNAL MEDICINE | Facility: CLINIC | Age: 78
End: 2018-10-22

## 2018-10-22 DIAGNOSIS — E78.2 HYPERLIPIDEMIA, MIXED: Primary | ICD-10-CM

## 2018-10-22 NOTE — PROGRESS NOTES
Please inform pt of normal lab results, except cholesterol slightly high - please continue walking and diet and we will recheck in 6 months.

## 2018-10-22 NOTE — TELEPHONE ENCOUNTER
Spoke with patient, informed her of normal lab results with the exception of slightly high cholesterol, advised to continue with diet and exercise, walking, and will recheck labs in 6 months

## 2019-01-15 ENCOUNTER — OFFICE VISIT (OUTPATIENT)
Dept: ORTHOPEDICS | Facility: CLINIC | Age: 79
End: 2019-01-15
Payer: MEDICARE

## 2019-01-15 ENCOUNTER — OFFICE VISIT (OUTPATIENT)
Dept: INTERNAL MEDICINE | Facility: CLINIC | Age: 79
End: 2019-01-15
Payer: MEDICARE

## 2019-01-15 ENCOUNTER — HOSPITAL ENCOUNTER (OUTPATIENT)
Dept: RADIOLOGY | Facility: HOSPITAL | Age: 79
Discharge: HOME | End: 2019-01-15
Attending: INTERNAL MEDICINE
Payer: MEDICARE

## 2019-01-15 VITALS
HEART RATE: 74 BPM | BODY MASS INDEX: 32.43 KG/M2 | WEIGHT: 183 LBS | DIASTOLIC BLOOD PRESSURE: 78 MMHG | HEIGHT: 63 IN | SYSTOLIC BLOOD PRESSURE: 141 MMHG

## 2019-01-15 VITALS
DIASTOLIC BLOOD PRESSURE: 87 MMHG | HEART RATE: 59 BPM | BODY MASS INDEX: 32.43 KG/M2 | OXYGEN SATURATION: 97 % | SYSTOLIC BLOOD PRESSURE: 133 MMHG | HEIGHT: 63 IN | WEIGHT: 183 LBS

## 2019-01-15 DIAGNOSIS — S52.601A RADIUS AND ULNA DISTAL FRACTURE, RIGHT, CLOSED, INITIAL ENCOUNTER: Primary | ICD-10-CM

## 2019-01-15 DIAGNOSIS — S52.501A RADIUS AND ULNA DISTAL FRACTURE, RIGHT, CLOSED, INITIAL ENCOUNTER: Primary | ICD-10-CM

## 2019-01-15 DIAGNOSIS — M25.531 WRIST PAIN, ACUTE, RIGHT: ICD-10-CM

## 2019-01-15 DIAGNOSIS — S52.614A CLOSED NONDISPLACED FRACTURE OF STYLOID PROCESS OF RIGHT ULNA, INITIAL ENCOUNTER: ICD-10-CM

## 2019-01-15 DIAGNOSIS — S69.91XA INJURY OF RIGHT WRIST, INITIAL ENCOUNTER: ICD-10-CM

## 2019-01-15 DIAGNOSIS — S69.91XA INJURY OF RIGHT WRIST, INITIAL ENCOUNTER: Primary | ICD-10-CM

## 2019-01-15 DIAGNOSIS — S52.101A CLOSED FRACTURE OF PROXIMAL END OF RIGHT RADIUS, UNSPECIFIED FRACTURE MORPHOLOGY, INITIAL ENCOUNTER: ICD-10-CM

## 2019-01-15 PROCEDURE — 73090 X-RAY EXAM OF FOREARM: CPT | Mod: 50,TC,FY,PO

## 2019-01-15 PROCEDURE — 3074F PR MOST RECENT SYSTOLIC BLOOD PRESSURE < 130 MM HG: ICD-10-PCS | Mod: CPTII,S$GLB,, | Performed by: PHYSICIAN ASSISTANT

## 2019-01-15 PROCEDURE — 3288F FALL RISK ASSESSMENT DOCD: CPT | Mod: CPTII,S$GLB,, | Performed by: INTERNAL MEDICINE

## 2019-01-15 PROCEDURE — 99999 PR PBB SHADOW E&M-EST. PATIENT-LVL III: CPT | Mod: PBBFAC,,, | Performed by: INTERNAL MEDICINE

## 2019-01-15 PROCEDURE — 73090 X-RAY EXAM OF FOREARM: CPT | Mod: 26,50,, | Performed by: RADIOLOGY

## 2019-01-15 PROCEDURE — 1100F PTFALLS ASSESS-DOCD GE2>/YR: CPT | Mod: CPTII,S$GLB,, | Performed by: INTERNAL MEDICINE

## 2019-01-15 PROCEDURE — 99203 PR OFFICE/OUTPT VISIT, NEW, LEVL III, 30-44 MIN: ICD-10-PCS | Mod: S$GLB,,, | Performed by: PHYSICIAN ASSISTANT

## 2019-01-15 PROCEDURE — 73110 X-RAY EXAM OF WRIST: CPT | Mod: 26,RT,, | Performed by: RADIOLOGY

## 2019-01-15 PROCEDURE — 1101F PT FALLS ASSESS-DOCD LE1/YR: CPT | Mod: CPTII,S$GLB,, | Performed by: PHYSICIAN ASSISTANT

## 2019-01-15 PROCEDURE — 73090 XR FOREARM BILATERAL: ICD-10-PCS | Mod: 26,50,, | Performed by: RADIOLOGY

## 2019-01-15 PROCEDURE — 99203 OFFICE O/P NEW LOW 30 MIN: CPT | Mod: S$GLB,,, | Performed by: PHYSICIAN ASSISTANT

## 2019-01-15 PROCEDURE — 99213 PR OFFICE/OUTPT VISIT, EST, LEVL III, 20-29 MIN: ICD-10-PCS | Mod: S$GLB,,, | Performed by: INTERNAL MEDICINE

## 2019-01-15 PROCEDURE — 73110 XR WRIST COMPLETE 3 VIEWS RIGHT: ICD-10-PCS | Mod: 26,RT,, | Performed by: RADIOLOGY

## 2019-01-15 PROCEDURE — 99999 PR PBB SHADOW E&M-EST. PATIENT-LVL III: CPT | Mod: PBBFAC,,, | Performed by: PHYSICIAN ASSISTANT

## 2019-01-15 PROCEDURE — 3078F PR MOST RECENT DIASTOLIC BLOOD PRESSURE < 80 MM HG: ICD-10-PCS | Mod: CPTII,S$GLB,, | Performed by: PHYSICIAN ASSISTANT

## 2019-01-15 PROCEDURE — 73110 X-RAY EXAM OF WRIST: CPT | Mod: TC,FY,PO,RT

## 2019-01-15 PROCEDURE — 3078F DIAST BP <80 MM HG: CPT | Mod: CPTII,S$GLB,, | Performed by: PHYSICIAN ASSISTANT

## 2019-01-15 PROCEDURE — 3288F PR FALLS RISK ASSESSMENT DOCUMENTED: ICD-10-PCS | Mod: CPTII,S$GLB,, | Performed by: INTERNAL MEDICINE

## 2019-01-15 PROCEDURE — 1101F PR PT FALLS ASSESS DOC 0-1 FALLS W/OUT INJ PAST YR: ICD-10-PCS | Mod: CPTII,S$GLB,, | Performed by: PHYSICIAN ASSISTANT

## 2019-01-15 PROCEDURE — 3075F SYST BP GE 130 - 139MM HG: CPT | Mod: CPTII,S$GLB,, | Performed by: INTERNAL MEDICINE

## 2019-01-15 PROCEDURE — 99999 PR PBB SHADOW E&M-EST. PATIENT-LVL III: ICD-10-PCS | Mod: PBBFAC,,, | Performed by: INTERNAL MEDICINE

## 2019-01-15 PROCEDURE — 99999 PR PBB SHADOW E&M-EST. PATIENT-LVL III: ICD-10-PCS | Mod: PBBFAC,,, | Performed by: PHYSICIAN ASSISTANT

## 2019-01-15 PROCEDURE — 3075F PR MOST RECENT SYSTOLIC BLOOD PRESS GE 130-139MM HG: ICD-10-PCS | Mod: CPTII,S$GLB,, | Performed by: INTERNAL MEDICINE

## 2019-01-15 PROCEDURE — 3074F SYST BP LT 130 MM HG: CPT | Mod: CPTII,S$GLB,, | Performed by: PHYSICIAN ASSISTANT

## 2019-01-15 PROCEDURE — 3079F DIAST BP 80-89 MM HG: CPT | Mod: CPTII,S$GLB,, | Performed by: INTERNAL MEDICINE

## 2019-01-15 PROCEDURE — 3079F PR MOST RECENT DIASTOLIC BLOOD PRESSURE 80-89 MM HG: ICD-10-PCS | Mod: CPTII,S$GLB,, | Performed by: INTERNAL MEDICINE

## 2019-01-15 PROCEDURE — 99213 OFFICE O/P EST LOW 20 MIN: CPT | Mod: S$GLB,,, | Performed by: INTERNAL MEDICINE

## 2019-01-15 PROCEDURE — 1100F PR PT FALLS ASSESS DOC 2+ FALLS/FALL W/INJURY/YR: ICD-10-PCS | Mod: CPTII,S$GLB,, | Performed by: INTERNAL MEDICINE

## 2019-01-15 NOTE — PROGRESS NOTES
SUBJECTIVE:     Chief Complaint & History of Present Illness:  Kelley Cruz is a New patient 78 y.o. female who is seen here today with a complaint of  right wrist pain .  Patient is here today as a referral from Internal Medicine for sudden onset pain in the right wrist following a fall today developed immediate pain and swelling in and about the hand and wrist. X-rays taken by primary care demonstrate fractures of both the radius and the ulna  minimally displaced  On a scale of 1-10, with 10 being worst pain imaginable, he rates this pain as 2 on good days and 5 on bad days.  she describes the pain as tender.    Review of patient's allergies indicates:   Allergen Reactions    Codeine      Other reaction(s): Headache    Pcn  [penicillins]      Other reaction(s): Nausea         Current Outpatient Medications   Medication Sig Dispense Refill    losartan (COZAAR) 25 MG tablet TAKE 1 TABLET (25 MG TOTAL) BY MOUTH ONCE DAILY. 90 tablet 1    multivitamin (MULTIVITAMIN) per tablet Take 1 tablet by mouth once daily.      triamterene-hydrochlorothiazide 37.5-25 mg (DYAZIDE) 37.5-25 mg per capsule TAKE 1 CAPSULE BY MOUTH ONCE DAILY. 90 capsule 2     No current facility-administered medications for this visit.        Past Medical History:   Diagnosis Date    Cataract     Hypertension     Keloid cicatrix     Osteoporosis        Past Surgical History:   Procedure Laterality Date    HYSTERECTOMY      TAHBSO for benign pelvic cystis mass    REPAIR-HERNIA-INGUINAL Right 11/4/2015    Performed by Joshua Goldberg, MD at [facility] OR [address] [place] [address]       Vital Signs (Most Recent)  Vitals:    01/15/19 1552   BP: (!) 141/78   Pulse: 74           Review of Systems:  ROS:  Constitutional: no fever or chills  Eyes: no visual changes  ENT: no nasal congestion or sore throat  Respiratory: no cough or shortness of breath  Cardiovascular: no chest pain or palpitations  Gastrointestinal: no nausea or vomiting, tolerating  "diet  Genitourinary: no hematuria or dysuria  Integument/Breast: no rash or pruritis  Hematologic/Lymphatic: no easy bruising or lymphadenopathy  Musculoskeletal: no arthralgias or myalgias  Neurological: no seizures or tremors  Behavioral/Psych: no auditory or visual hallucinations  Endocrine: no heat or cold intolerance                OBJECTIVE:     PHYSICAL EXAM:  Height: 5' 3" (160 cm) Weight: 83 kg (182 lb 15.7 oz), General Appearance: Well nourished, well developed, in no acute distress.  Neurological: Mood & affect are normal.  right wrist   History of injury: fall  Pain: Description: moderate, intermittent and worse if dependent  Quality: sharp and stabbing  Location: wrist, radial and ulnar  Exacerbating factors: activity  Alleviating factors: ice, OTC NSAIDS and rest  Neurological complaints: none  Mass/Swelling:  Moderate  Condition of skin:intact  Hand Exam: soft tissue tenderness and swelling at the Wrist, reduced range of motion of Wrist, negative Phalen, negative Tinel and negative Cozen    Wrist Exam: restricted due to recent injury      RADIOGRAPHS:  X-rays taken today films reviewed by me demonstrate an oblique fracture through the distal diametaphyseal is of the right radius; this may extend to the articular surface.  Ulnar styloid fracture is also present    ASSESSMENT/PLAN:     Plan: We discussed with the patient at length all the different treatment options available for her wrist, including anti-inflammatories, acetaminophen, rest, ice, physical therapy to include strengthening, range of motion exercise ultrasound, splinting,  occasional cortisone injections for temporary relief,  or possible surgical interventions.  Will place the patient in Exos splint for support and protection continue ice and elevation she is scheduled to follow-up with the hand service in 2 days  "

## 2019-01-15 NOTE — PROGRESS NOTES
REASON FOR VISIT:  This is a 78-year-old female who is here regarding pain and   injury involving her right wrist, forearm.  Yesterday around 1:00 p.m., she had   a fall in a parking lot of  [employer].  She was walking on an uneven surface,   lost her footing, went forward.  Her left wrist and forearm hit a pole and   actually broke the fall.  She just landed on left knee, but was able to get up.    She went home, has been putting ice on the forearm and wrist and wrapped it.    She has a bit more swelling today and there is an area of redness over the ulnar   aspect.  It is difficult for her to grab or hold onto something.    PAST MEDICAL HISTORY:  Hypertension.  Osteoporosis, lumbar vertebrae, based on recent bone density.    MEDICATIONS:  Per MedCard.    PHYSICAL EXAMINATION:  VITAL SIGNS:  Per EPIC.  MUSCULOSKELETAL:  Area of soft tissue swelling is seen over the distal aspect of   the forearm and wrist.  There is an area of hyperemia over the ulnar aspect of   the wrist and forearm.  It is tender to touch.  She can move her fingers.  There   is no pain.  She has pain when I abduct the hand at the wrist or flex the hand   at the wrist.    IMPRESSION:  Acute wrist pain and injury, probably related to tenosynovitis, but   need to rule out fracture.    PLAN:  Wrist and forearm x-ray and disposition to follow.        /lizette [id number] review        JAM/OSEI  dd: 01/15/2019 10:57:11 (CST)  td: 01/16/2019 02:04:14 (CST)  Doc ID   #[id number]  Job ID #[id number]    CC:

## 2019-01-15 NOTE — LETTER
January 15, 2019      Hector Christensen MD  [address]  [facility] [address]           [facility]  [address], [address]  [facility] [address]  Phone: [phone]          Patient: Kelley Cruz   MR Number: [id number]   YOB: 1940   Date of Visit: 1/15/2019       Dear Dr. Hector Christensen:    Thank you for referring Kelley Cruz to me for evaluation. Attached you will find relevant portions of my assessment and plan of care.    If you have questions, please do not hesitate to call me. I look forward to following Kelley Cruz along with you.    Sincerely,    Alen Lerma PA-C    Enclosure  CC:  No Recipients    If you would like to receive this communication electronically, please contact [email or web address] or [phone] to request more information on [employer] access.    For providers and/or their staff who would like to refer a patient to [facility], please contact us through our one-stop-shop provider referral line, [facility] , at [phone].    If you feel you have received this communication in error or would no longer like to receive these types of communications, please e-mail [email or web address]

## 2019-01-17 ENCOUNTER — DOCUMENTATION ONLY (OUTPATIENT)
Dept: ORTHOPEDICS | Facility: CLINIC | Age: 79
End: 2019-01-17

## 2019-01-17 ENCOUNTER — OFFICE VISIT (OUTPATIENT)
Dept: ORTHOPEDICS | Facility: CLINIC | Age: 79
End: 2019-01-17
Payer: MEDICARE

## 2019-01-17 VITALS
BODY MASS INDEX: 32.43 KG/M2 | HEART RATE: 69 BPM | WEIGHT: 183 LBS | HEIGHT: 63 IN | DIASTOLIC BLOOD PRESSURE: 81 MMHG | SYSTOLIC BLOOD PRESSURE: 124 MMHG

## 2019-01-17 DIAGNOSIS — S52.501A CLOSED FRACTURE OF DISTAL END OF RIGHT RADIUS, UNSPECIFIED FRACTURE MORPHOLOGY, INITIAL ENCOUNTER: ICD-10-CM

## 2019-01-17 PROCEDURE — 99999 PR PBB SHADOW E&M-EST. PATIENT-LVL III: CPT | Mod: PBBFAC,,, | Performed by: ORTHOPAEDIC SURGERY

## 2019-01-17 PROCEDURE — 1101F PR PT FALLS ASSESS DOC 0-1 FALLS W/OUT INJ PAST YR: ICD-10-PCS | Mod: CPTII,S$GLB,, | Performed by: ORTHOPAEDIC SURGERY

## 2019-01-17 PROCEDURE — 3079F PR MOST RECENT DIASTOLIC BLOOD PRESSURE 80-89 MM HG: ICD-10-PCS | Mod: CPTII,S$GLB,, | Performed by: ORTHOPAEDIC SURGERY

## 2019-01-17 PROCEDURE — 99204 OFFICE O/P NEW MOD 45 MIN: CPT | Mod: S$GLB,,, | Performed by: ORTHOPAEDIC SURGERY

## 2019-01-17 PROCEDURE — 3079F DIAST BP 80-89 MM HG: CPT | Mod: CPTII,S$GLB,, | Performed by: ORTHOPAEDIC SURGERY

## 2019-01-17 PROCEDURE — 3074F SYST BP LT 130 MM HG: CPT | Mod: CPTII,S$GLB,, | Performed by: ORTHOPAEDIC SURGERY

## 2019-01-17 PROCEDURE — 99999 PR PBB SHADOW E&M-EST. PATIENT-LVL III: ICD-10-PCS | Mod: PBBFAC,,, | Performed by: ORTHOPAEDIC SURGERY

## 2019-01-17 PROCEDURE — 3074F PR MOST RECENT SYSTOLIC BLOOD PRESSURE < 130 MM HG: ICD-10-PCS | Mod: CPTII,S$GLB,, | Performed by: ORTHOPAEDIC SURGERY

## 2019-01-17 PROCEDURE — 1101F PT FALLS ASSESS-DOCD LE1/YR: CPT | Mod: CPTII,S$GLB,, | Performed by: ORTHOPAEDIC SURGERY

## 2019-01-17 PROCEDURE — 99204 PR OFFICE/OUTPT VISIT, NEW, LEVL IV, 45-59 MIN: ICD-10-PCS | Mod: S$GLB,,, | Performed by: ORTHOPAEDIC SURGERY

## 2019-01-17 RX ORDER — TRAMADOL HYDROCHLORIDE 50 MG/1
50 TABLET ORAL EVERY 6 HOURS PRN
Qty: 20 TABLET | Refills: 0 | Status: SHIPPED | OUTPATIENT
Start: 2019-01-17 | End: 2020-08-31

## 2019-01-17 RX ORDER — ASCORBIC ACID 500 MG
500 TABLET ORAL DAILY
Qty: 50 TABLET | Refills: 0 | Status: SHIPPED | OUTPATIENT
Start: 2019-01-17

## 2019-01-17 RX ORDER — OXYCODONE AND ACETAMINOPHEN 5; 325 MG/1; MG/1
1 TABLET ORAL EVERY 6 HOURS PRN
Qty: 35 TABLET | Refills: 0 | Status: SHIPPED | OUTPATIENT
Start: 2019-01-17 | End: 2019-04-18

## 2019-01-18 DIAGNOSIS — S52.501A CLOSED FRACTURE OF DISTAL END OF RIGHT RADIUS, UNSPECIFIED FRACTURE MORPHOLOGY, INITIAL ENCOUNTER: Primary | ICD-10-CM

## 2019-01-18 NOTE — H&P (VIEW-ONLY)
H&P  Orthopedic Surgery      SUBJECTIVE:     History of Present Illness:  Kelley Cruz is a D  78 y.o. female presenting with wrist pain. Patient was walking in from of grocery store and fell onto right wrist. Immediate pain. Denies head injury or LOC. Denies blood thinners. Denies other MSK pains. Denies numbness, tingling, or paresthesias to extremity.        Past Medical History:   Diagnosis Date    Cataract     Hypertension     Keloid cicatrix     Osteoporosis        Past Surgical History:   Procedure Laterality Date    HYSTERECTOMY      TAHBSO for benign pelvic cystis mass    REPAIR-HERNIA-INGUINAL Right 2015    Performed by Joshua Goldberg, MD at [facility] OR [address] [place] [address]       Family History   Problem Relation Age of Onset    Diabetes Mother     Glaucoma Mother     Cataracts Mother     Hypertension Father     Cancer Father     Diabetes Maternal Aunt     Diabetes Maternal Uncle     Melanoma Neg Hx     Blindness Neg Hx     Macular degeneration Neg Hx     Retinal detachment Neg Hx        Social History     Socioeconomic History    Marital status:      Spouse name: Not on file    Number of children: Not on file    Years of education: Not on file    Highest education level: Not on file   Social Needs    Financial resource strain: Not on file    Food insecurity - worry: Not on file    Food insecurity - inability: Not on file    Transportation needs - medical: Not on file    Transportation needs - non-medical: Not on file   Occupational History    Not on file   Tobacco Use    Smoking status: Never Smoker    Smokeless tobacco: Never Used   Substance and Sexual Activity    Alcohol use: No    Drug use: No    Sexual activity: Not on file   Other Topics Concern    Are you pregnant or think you may be? 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 90 tablet 1    multivitamin (MULTIVITAMIN) per tablet Take 1 tablet by mouth once daily.      triamterene-hydrochlorothiazide 37.5-25 mg (DYAZIDE) 37.5-25 mg per capsule TAKE 1 CAPSULE BY MOUTH ONCE DAILY. 90 capsule 2    ascorbic acid, vitamin C, (VITAMIN C) 500 MG tablet Take 1 tablet (500 mg total) by mouth once daily. 50 tablet 0    oxyCODONE-acetaminophen (PERCOCET) 5-325 mg per tablet Take 1 tablet by mouth every 6 (six) hours as needed for Pain. 35 tablet 0    traMADol (ULTRAM) 50 mg tablet Take 1 tablet (50 mg total) by mouth every 6 (six) hours as needed for Pain. 20 tablet 0     No current facility-administered medications for this visit.        Review of patient's allergies indicates:   Allergen Reactions    Codeine      Other reaction(s): Headache    Pcn  [penicillins]      Other reaction(s): Nausea         Review of Systems:  ROS: Patient denies constitutional symptoms, cardiac symptoms, respiratory symptoms, GI symptoms. The remainder of the musculoskeletal ROS is included in the HPI.      OBJECTIVE:     Vital Signs (Most Recent)  Vitals:    01/17/19 1142   BP: 124/81   Pulse: 69   Weight: 83 kg (182 lb 15.7 oz)   Height: 5' 3" (1.6 m)         Physical Exam:  Constitutional:  NAD; well-developed and well-nourished  Pulmonary/Chest: Effort normal  Skin: Warm and dry  Neuro: Alert and oriented to person, place, and time; no focal numbness or weakness    RUE:  Skin intact throughout  Mild swelling around wrist  No tenderness to palpation of proximal, middle, or distal aspects of humerus  No tenderness to palpation of proximal or middle aspects of radius or ulna  No tenderness to palpation of hand  Compartments soft  Painless ROM shoulder and elbow  SILT M/U/R  Motor intact AIN/PIN/M/U/R  2+ radial  Brisk capillary refill "       Diagnostic Results:  X-Ray: Reviewed right wrist: volar intra-articular distal radius fracture    ASSESSMENT/PLAN:     Kelley Cruz is a 78 y.o. female with right volar Tristan's distal radius fracture    - Will require operative treatment  - Placed in sugartong splint  - Plan for ORIF right distal radius fracture next week  - Surgical consents signed in clinic. I have explained the risks, benefits, and alternatives of the procedure in detail.  The patient voices understanding and all questions have been answered.  The patient agrees to proceed as planned.   - Given preop and postop pain medications  - ALISSA RICHARDSON  - Discussed vitamin C 500 mg for 50 days for CRPS prophylaxis

## 2019-01-18 NOTE — H&P
H&P  Orthopedic Surgery      SUBJECTIVE:     History of Present Illness:  Kelley Cruz is a D  78 y.o. female presenting with wrist pain. Patient was walking in from of grocery store and fell onto right wrist. Immediate pain. Denies head injury or LOC. Denies blood thinners. Denies other MSK pains. Denies numbness, tingling, or paresthesias to extremity.        Past Medical History:   Diagnosis Date    Cataract     Hypertension     Keloid cicatrix     Osteoporosis        Past Surgical History:   Procedure Laterality Date    HYSTERECTOMY      TAHBSO for benign pelvic cystis mass    REPAIR-HERNIA-INGUINAL Right 2015    Performed by Joshua Goldberg, MD at [facility] OR [address] [place] [address]       Family History   Problem Relation Age of Onset    Diabetes Mother     Glaucoma Mother     Cataracts Mother     Hypertension Father     Cancer Father     Diabetes Maternal Aunt     Diabetes Maternal Uncle     Melanoma Neg Hx     Blindness Neg Hx     Macular degeneration Neg Hx     Retinal detachment Neg Hx        Social History     Socioeconomic History    Marital status:      Spouse name: Not on file    Number of children: Not on file    Years of education: Not on file    Highest education level: Not on file   Social Needs    Financial resource strain: Not on file    Food insecurity - worry: Not on file    Food insecurity - inability: Not on file    Transportation needs - medical: Not on file    Transportation needs - non-medical: Not on file   Occupational History    Not on file   Tobacco Use    Smoking status: Never Smoker    Smokeless tobacco: Never Used   Substance and Sexual Activity    Alcohol use: No    Drug use: No    Sexual activity: Not on file   Other Topics Concern    Are you pregnant or think you may be? 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 90 tablet 1    multivitamin (MULTIVITAMIN) per tablet Take 1 tablet by mouth once daily.      triamterene-hydrochlorothiazide 37.5-25 mg (DYAZIDE) 37.5-25 mg per capsule TAKE 1 CAPSULE BY MOUTH ONCE DAILY. 90 capsule 2    ascorbic acid, vitamin C, (VITAMIN C) 500 MG tablet Take 1 tablet (500 mg total) by mouth once daily. 50 tablet 0    oxyCODONE-acetaminophen (PERCOCET) 5-325 mg per tablet Take 1 tablet by mouth every 6 (six) hours as needed for Pain. 35 tablet 0    traMADol (ULTRAM) 50 mg tablet Take 1 tablet (50 mg total) by mouth every 6 (six) hours as needed for Pain. 20 tablet 0     No current facility-administered medications for this visit.        Review of patient's allergies indicates:   Allergen Reactions    Codeine      Other reaction(s): Headache    Pcn  [penicillins]      Other reaction(s): Nausea         Review of Systems:  ROS: Patient denies constitutional symptoms, cardiac symptoms, respiratory symptoms, GI symptoms. The remainder of the musculoskeletal ROS is included in the HPI.      OBJECTIVE:     Vital Signs (Most Recent)  Vitals:    01/17/19 1142   BP: 124/81   Pulse: 69   Weight: 83 kg (182 lb 15.7 oz)   Height: 5' 3" (1.6 m)         Physical Exam:  Constitutional:  NAD; well-developed and well-nourished  Pulmonary/Chest: Effort normal  Skin: Warm and dry  Neuro: Alert and oriented to person, place, and time; no focal numbness or weakness    RUE:  Skin intact throughout  Mild swelling around wrist  No tenderness to palpation of proximal, middle, or distal aspects of humerus  No tenderness to palpation of proximal or middle aspects of radius or ulna  No tenderness to palpation of hand  Compartments soft  Painless ROM shoulder and elbow  SILT M/U/R  Motor intact AIN/PIN/M/U/R  2+ radial  Brisk capillary refill "       Diagnostic Results:  X-Ray: Reviewed right wrist: volar intra-articular distal radius fracture    ASSESSMENT/PLAN:     Kelley Cruz is a 78 y.o. female with right volar Tristan's distal radius fracture    - Will require operative treatment  - Placed in sugartong splint  - Plan for ORIF right distal radius fracture next week  - Surgical consents signed in clinic. I have explained the risks, benefits, and alternatives of the procedure in detail.  The patient voices understanding and all questions have been answered.  The patient agrees to proceed as planned.   - Given preop and postop pain medications  - ALISSA RICHARDSON  - Discussed vitamin C 500 mg for 50 days for CRPS prophylaxis

## 2019-01-18 NOTE — PROGRESS NOTES
H&P  Orthopedic Surgery      SUBJECTIVE:     History of Present Illness:  Kelley Cruz is a D  78 y.o. female presenting with wrist pain. Patient was walking in from of grocery store and fell onto right wrist. Immediate pain. Denies head injury or LOC. Denies blood thinners. Denies other MSK pains. Denies numbness, tingling, or paresthesias to extremity.        Past Medical History:   Diagnosis Date    Cataract     Hypertension     Keloid cicatrix     Osteoporosis        Past Surgical History:   Procedure Laterality Date    HYSTERECTOMY      TAHBSO for benign pelvic cystis mass    REPAIR-HERNIA-INGUINAL Right 2015    Performed by Joshua Goldberg, MD at [facility] OR [address] [place] [address]       Family History   Problem Relation Age of Onset    Diabetes Mother     Glaucoma Mother     Cataracts Mother     Hypertension Father     Cancer Father     Diabetes Maternal Aunt     Diabetes Maternal Uncle     Melanoma Neg Hx     Blindness Neg Hx     Macular degeneration Neg Hx     Retinal detachment Neg Hx        Social History     Socioeconomic History    Marital status:      Spouse name: Not on file    Number of children: Not on file    Years of education: Not on file    Highest education level: Not on file   Social Needs    Financial resource strain: Not on file    Food insecurity - worry: Not on file    Food insecurity - inability: Not on file    Transportation needs - medical: Not on file    Transportation needs - non-medical: Not on file   Occupational History    Not on file   Tobacco Use    Smoking status: Never Smoker    Smokeless tobacco: Never Used   Substance and Sexual Activity    Alcohol use: No    Drug use: No    Sexual activity: Not on file   Other Topics Concern    Are you pregnant or think you may be? 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 90 tablet 1    multivitamin (MULTIVITAMIN) per tablet Take 1 tablet by mouth once daily.      triamterene-hydrochlorothiazide 37.5-25 mg (DYAZIDE) 37.5-25 mg per capsule TAKE 1 CAPSULE BY MOUTH ONCE DAILY. 90 capsule 2    ascorbic acid, vitamin C, (VITAMIN C) 500 MG tablet Take 1 tablet (500 mg total) by mouth once daily. 50 tablet 0    oxyCODONE-acetaminophen (PERCOCET) 5-325 mg per tablet Take 1 tablet by mouth every 6 (six) hours as needed for Pain. 35 tablet 0    traMADol (ULTRAM) 50 mg tablet Take 1 tablet (50 mg total) by mouth every 6 (six) hours as needed for Pain. 20 tablet 0     No current facility-administered medications for this visit.        Review of patient's allergies indicates:   Allergen Reactions    Codeine      Other reaction(s): Headache    Pcn  [penicillins]      Other reaction(s): Nausea         Review of Systems:  ROS: Patient denies constitutional symptoms, cardiac symptoms, respiratory symptoms, GI symptoms. The remainder of the musculoskeletal ROS is included in the HPI.      OBJECTIVE:     Vital Signs (Most Recent)  Vitals:    01/17/19 1142   BP: 124/81   Pulse: 69   Weight: 83 kg (182 lb 15.7 oz)   Height: 5' 3" (1.6 m)         Physical Exam:  Constitutional:  NAD; well-developed and well-nourished  Pulmonary/Chest: Effort normal  Skin: Warm and dry  Neuro: Alert and oriented to person, place, and time; no focal numbness or weakness    RUE:  Skin intact throughout  Mild swelling around wrist  No tenderness to palpation of proximal, middle, or distal aspects of humerus  No tenderness to palpation of proximal or middle aspects of radius or ulna  No tenderness to palpation of hand  Compartments soft  Painless ROM shoulder and elbow  SILT M/U/R  Motor intact AIN/PIN/M/U/R  2+ radial  Brisk capillary refill "       Diagnostic Results:  X-Ray: Reviewed right wrist: volar intra-articular distal radius fracture    ASSESSMENT/PLAN:     Kelley Cruz is a 78 y.o. female with right volar Tristan's distal radius fracture    - Will require operative treatment  - Placed in sugartong splint  - Plan for ORIF right distal radius fracture next week  - Surgical consents signed in clinic. I have explained the risks, benefits, and alternatives of the procedure in detail.  The patient voices understanding and all questions have been answered.  The patient agrees to proceed as planned.   - Given preop and postop pain medications  - ALISSA RICHARDSON  - Discussed vitamin C 500 mg for 50 days for CRPS prophylaxis

## 2019-01-21 ENCOUNTER — TELEPHONE (OUTPATIENT)
Dept: ORTHOPEDICS | Facility: CLINIC | Age: 79
End: 2019-01-21

## 2019-01-21 NOTE — TELEPHONE ENCOUNTER
Left a voicemail for patient to return my phone call in regards to procedure at their convenience. Provided the clinic's phone number.

## 2019-01-21 NOTE — TELEPHONE ENCOUNTER
----- Message from Ganesh Piper sent at 1/21/2019  9:54 AM CST -----  Contact: ANUJA JANE [[id number]]  Name of Who is Calling: ANUJA JANE [[id number]]       What is the request in detail: Patient called in regards to getting prep information for upcoming procedure. Pt have concerns and request an urgent call back from Nurse. Please advise.        Can the clinic reply by [facility]: No     What Number to Call Back if not in [facility]: [phone] or  [phone]

## 2019-01-22 ENCOUNTER — TELEPHONE (OUTPATIENT)
Dept: ORTHOPEDICS | Facility: CLINIC | Age: 79
End: 2019-01-22

## 2019-01-22 NOTE — TELEPHONE ENCOUNTER
Kelley Cruz notified of arrival time 1045 for surgery on 1/23/19 with Dr. RAE Carson. At [facility]. Post Op appointment made, slip in mail. Reminded of need for a ride home from surgery.

## 2019-01-22 NOTE — PRE-PROCEDURE INSTRUCTIONS
Anesthesia review complete, medication instructions given NPO past midnight, Bathe with hibiclens or dial soap the night before & am of surgery. Put nothing on skin -  lotion, deodorant, powder  No metal or jewelry . message left with instruction

## 2019-01-23 ENCOUNTER — ANESTHESIA EVENT (OUTPATIENT)
Dept: SURGERY | Facility: HOSPITAL | Age: 79
End: 2019-01-23
Payer: MEDICARE

## 2019-01-23 ENCOUNTER — HOSPITAL ENCOUNTER (OUTPATIENT)
Facility: HOSPITAL | Age: 79
Discharge: HOME | End: 2019-01-23
Attending: ORTHOPAEDIC SURGERY | Admitting: ORTHOPAEDIC SURGERY
Payer: MEDICARE

## 2019-01-23 ENCOUNTER — ANESTHESIA (OUTPATIENT)
Dept: SURGERY | Facility: HOSPITAL | Age: 79
End: 2019-01-23
Payer: MEDICARE

## 2019-01-23 VITALS
HEART RATE: 74 BPM | TEMPERATURE: 98 F | OXYGEN SATURATION: 95 % | BODY MASS INDEX: 31.89 KG/M2 | RESPIRATION RATE: 17 BRPM | SYSTOLIC BLOOD PRESSURE: 136 MMHG | HEIGHT: 63 IN | WEIGHT: 180 LBS | DIASTOLIC BLOOD PRESSURE: 67 MMHG

## 2019-01-23 DIAGNOSIS — S52.501A CLOSED FRACTURE OF RIGHT DISTAL RADIUS: Primary | ICD-10-CM

## 2019-01-23 PROCEDURE — 37000008 HC ANESTHESIA 1ST 15 MINUTES: Performed by: ORTHOPAEDIC SURGERY

## 2019-01-23 PROCEDURE — 64416 NJX AA&/STRD BRCH PL NFS IMG: CPT | Performed by: ANESTHESIOLOGY

## 2019-01-23 PROCEDURE — 71000044 HC DOSC ROUTINE RECOVERY FIRST HOUR: Performed by: ORTHOPAEDIC SURGERY

## 2019-01-23 PROCEDURE — 27800517 HC TRAY,EPIDURAL-CONTINUOUS: Performed by: ANESTHESIOLOGY

## 2019-01-23 PROCEDURE — 25000003 PHARM REV CODE 250: Performed by: ORTHOPAEDIC SURGERY

## 2019-01-23 PROCEDURE — 63600175 PHARM REV CODE 636 W HCPCS: Performed by: STUDENT IN AN ORGANIZED HEALTH CARE EDUCATION/TRAINING PROGRAM

## 2019-01-23 PROCEDURE — 25000003 PHARM REV CODE 250

## 2019-01-23 PROCEDURE — 64416 INFRACLAVICULAR BRACHIAL PLEXUS CATHETER: ICD-10-PCS | Mod: 59,RT,, | Performed by: ANESTHESIOLOGY

## 2019-01-23 PROCEDURE — 36000710: Performed by: ORTHOPAEDIC SURGERY

## 2019-01-23 PROCEDURE — 25609 OPTX DST RD XART FX/EP SEP3+: CPT | Mod: RT,,, | Performed by: ORTHOPAEDIC SURGERY

## 2019-01-23 PROCEDURE — D9220A PRA ANESTHESIA: Mod: ,,, | Performed by: ANESTHESIOLOGY

## 2019-01-23 PROCEDURE — 63600175 PHARM REV CODE 636 W HCPCS

## 2019-01-23 PROCEDURE — 36000711: Performed by: ORTHOPAEDIC SURGERY

## 2019-01-23 PROCEDURE — 25000003 PHARM REV CODE 250: Performed by: ANESTHESIOLOGY

## 2019-01-23 PROCEDURE — C1769 GUIDE WIRE: HCPCS | Performed by: ORTHOPAEDIC SURGERY

## 2019-01-23 PROCEDURE — 76942 INFRACLAVICULAR BRACHIAL PLEXUS CATHETER: ICD-10-PCS | Mod: 26,,, | Performed by: ANESTHESIOLOGY

## 2019-01-23 PROCEDURE — 27201423 OPTIME MED/SURG SUP & DEVICES STERILE SUPPLY: Performed by: ORTHOPAEDIC SURGERY

## 2019-01-23 PROCEDURE — D9220A PRA ANESTHESIA: ICD-10-PCS | Mod: ,,, | Performed by: ANESTHESIOLOGY

## 2019-01-23 PROCEDURE — C1713 ANCHOR/SCREW BN/BN,TIS/BN: HCPCS | Performed by: ORTHOPAEDIC SURGERY

## 2019-01-23 PROCEDURE — 76942 ECHO GUIDE FOR BIOPSY: CPT | Mod: 26,,, | Performed by: ANESTHESIOLOGY

## 2019-01-23 PROCEDURE — 71000016 HC POSTOP RECOV ADDL HR: Performed by: ORTHOPAEDIC SURGERY

## 2019-01-23 PROCEDURE — 25609 PR OPEN RX DISTAL RADIUS FX, INTRA-ARTICULAR, 3+ FRAG: ICD-10-PCS | Mod: RT,,, | Performed by: ORTHOPAEDIC SURGERY

## 2019-01-23 PROCEDURE — 63600175 PHARM REV CODE 636 W HCPCS: Performed by: ANESTHESIOLOGY

## 2019-01-23 PROCEDURE — 37000009 HC ANESTHESIA EA ADD 15 MINS: Performed by: ORTHOPAEDIC SURGERY

## 2019-01-23 PROCEDURE — 63600175 PHARM REV CODE 636 W HCPCS: Performed by: NURSE ANESTHETIST, CERTIFIED REGISTERED

## 2019-01-23 PROCEDURE — 71000015 HC POSTOP RECOV 1ST HR: Performed by: ORTHOPAEDIC SURGERY

## 2019-01-23 DEVICE — SCREW BONE 2.3 X 22MM: Type: IMPLANTABLE DEVICE | Site: WRIST | Status: FUNCTIONAL

## 2019-01-23 DEVICE — SCREW BONE 2.3 X 16MM: Type: IMPLANTABLE DEVICE | Site: WRIST | Status: FUNCTIONAL

## 2019-01-23 DEVICE — SCREW BONE LOK CONG 2.3X20MM: Type: IMPLANTABLE DEVICE | Site: WRIST | Status: FUNCTIONAL

## 2019-01-23 DEVICE — SCREW BONE NONLOCK HEX 3.5X10: Type: IMPLANTABLE DEVICE | Site: WRIST | Status: FUNCTIONAL

## 2019-01-23 DEVICE — SCREW BNE N LOK HEXLB 3.5X12: Type: IMPLANTABLE DEVICE | Site: WRIST | Status: FUNCTIONAL

## 2019-01-23 DEVICE — PLATE VDR NARROW RIGHT: Type: IMPLANTABLE DEVICE | Site: WRIST | Status: FUNCTIONAL

## 2019-01-23 DEVICE — SCREW BONE 2.3 X 18MM: Type: IMPLANTABLE DEVICE | Site: WRIST | Status: FUNCTIONAL

## 2019-01-23 RX ORDER — LIDOCAINE HYDROCHLORIDE 10 MG/ML
1 INJECTION, SOLUTION EPIDURAL; INFILTRATION; INTRACAUDAL; PERINEURAL ONCE
Status: COMPLETED | OUTPATIENT
Start: 2019-01-23 | End: 2019-01-23

## 2019-01-23 RX ORDER — BACITRACIN 500 [USP'U]/G
OINTMENT TOPICAL
Status: DISCONTINUED
Start: 2019-01-23 | End: 2019-01-23 | Stop reason: HOSPADM

## 2019-01-23 RX ORDER — PROPOFOL 10 MG/ML
VIAL (ML) INTRAVENOUS CONTINUOUS PRN
Status: DISCONTINUED | OUTPATIENT
Start: 2019-01-23 | End: 2019-01-23

## 2019-01-23 RX ORDER — LIDOCAINE HCL/PF 100 MG/5ML
SYRINGE (ML) INTRAVENOUS
Status: DISCONTINUED | OUTPATIENT
Start: 2019-01-23 | End: 2019-01-23

## 2019-01-23 RX ORDER — MEPERIDINE HYDROCHLORIDE 25 MG/ML
12.5 INJECTION INTRAMUSCULAR; INTRAVENOUS; SUBCUTANEOUS EVERY 10 MIN PRN
Status: DISCONTINUED | OUTPATIENT
Start: 2019-01-23 | End: 2019-01-23 | Stop reason: HOSPADM

## 2019-01-23 RX ORDER — HYDROMORPHONE HYDROCHLORIDE 1 MG/ML
0.2 INJECTION, SOLUTION INTRAMUSCULAR; INTRAVENOUS; SUBCUTANEOUS EVERY 5 MIN PRN
Status: DISCONTINUED | OUTPATIENT
Start: 2019-01-23 | End: 2019-01-23 | Stop reason: HOSPADM

## 2019-01-23 RX ORDER — SODIUM CHLORIDE 0.9 % (FLUSH) 0.9 %
3 SYRINGE (ML) INJECTION
Status: DISCONTINUED | OUTPATIENT
Start: 2019-01-23 | End: 2019-01-23 | Stop reason: HOSPADM

## 2019-01-23 RX ORDER — FENTANYL CITRATE 50 UG/ML
INJECTION, SOLUTION INTRAMUSCULAR; INTRAVENOUS
Status: COMPLETED
Start: 2019-01-23 | End: 2019-01-23

## 2019-01-23 RX ORDER — SODIUM CHLORIDE 9 MG/ML
1000 INJECTION, SOLUTION INTRAVENOUS CONTINUOUS
Status: DISCONTINUED | OUTPATIENT
Start: 2019-01-23 | End: 2019-01-23 | Stop reason: HOSPADM

## 2019-01-23 RX ORDER — BACITRACIN ZINC 500 UNIT/G
OINTMENT (GRAM) TOPICAL
Status: DISCONTINUED | OUTPATIENT
Start: 2019-01-23 | End: 2019-01-23 | Stop reason: HOSPADM

## 2019-01-23 RX ORDER — MIDAZOLAM HYDROCHLORIDE 1 MG/ML
0.5 INJECTION INTRAMUSCULAR; INTRAVENOUS
Status: DISCONTINUED | OUTPATIENT
Start: 2019-01-23 | End: 2019-01-23 | Stop reason: HOSPADM

## 2019-01-23 RX ORDER — OXYCODONE HYDROCHLORIDE 5 MG/1
5 TABLET ORAL EVERY 4 HOURS PRN
Status: DISCONTINUED | OUTPATIENT
Start: 2019-01-23 | End: 2019-01-23 | Stop reason: HOSPADM

## 2019-01-23 RX ORDER — MUPIROCIN 20 MG/G
OINTMENT TOPICAL
Status: DISCONTINUED | OUTPATIENT
Start: 2019-01-23 | End: 2019-01-23 | Stop reason: HOSPADM

## 2019-01-23 RX ORDER — MUPIROCIN 20 MG/G
OINTMENT TOPICAL
Status: COMPLETED
Start: 2019-01-23 | End: 2019-01-23

## 2019-01-23 RX ORDER — FENTANYL CITRATE 50 UG/ML
25 INJECTION, SOLUTION INTRAMUSCULAR; INTRAVENOUS EVERY 5 MIN PRN
Status: DISCONTINUED | OUTPATIENT
Start: 2019-01-23 | End: 2019-01-23 | Stop reason: HOSPADM

## 2019-01-23 RX ORDER — BUPIVACAINE HYDROCHLORIDE AND EPINEPHRINE 5; 5 MG/ML; UG/ML
INJECTION, SOLUTION EPIDURAL; INTRACAUDAL; PERINEURAL
Status: COMPLETED | OUTPATIENT
Start: 2019-01-23 | End: 2019-01-23

## 2019-01-23 RX ORDER — CEFAZOLIN SODIUM 1 G/3ML
2 INJECTION, POWDER, FOR SOLUTION INTRAMUSCULAR; INTRAVENOUS
Status: COMPLETED | OUTPATIENT
Start: 2019-01-23 | End: 2019-01-23

## 2019-01-23 RX ORDER — MIDAZOLAM HYDROCHLORIDE 1 MG/ML
INJECTION INTRAMUSCULAR; INTRAVENOUS
Status: COMPLETED
Start: 2019-01-23 | End: 2019-01-23

## 2019-01-23 RX ORDER — SODIUM CHLORIDE 9 MG/ML
INJECTION, SOLUTION INTRAVENOUS CONTINUOUS
Status: DISCONTINUED | OUTPATIENT
Start: 2019-01-23 | End: 2019-01-23 | Stop reason: HOSPADM

## 2019-01-23 RX ADMIN — FENTANYL CITRATE 50 MCG: 50 INJECTION, SOLUTION INTRAMUSCULAR; INTRAVENOUS at 12:01

## 2019-01-23 RX ADMIN — MIDAZOLAM HYDROCHLORIDE 1 MG: 1 INJECTION, SOLUTION INTRAMUSCULAR; INTRAVENOUS at 12:01

## 2019-01-23 RX ADMIN — LIDOCAINE HYDROCHLORIDE 100 MG: 20 INJECTION, SOLUTION INTRAVENOUS at 01:01

## 2019-01-23 RX ADMIN — MIDAZOLAM HYDROCHLORIDE 1 MG: 1 INJECTION INTRAMUSCULAR; INTRAVENOUS at 12:01

## 2019-01-23 RX ADMIN — FENTANYL CITRATE 50 MCG: 50 INJECTION INTRAMUSCULAR; INTRAVENOUS at 12:01

## 2019-01-23 RX ADMIN — MUPIROCIN: 20 OINTMENT TOPICAL at 11:01

## 2019-01-23 RX ADMIN — LIDOCAINE HYDROCHLORIDE 10 MG: 10 INJECTION, SOLUTION EPIDURAL; INFILTRATION; INTRACAUDAL; PERINEURAL at 11:01

## 2019-01-23 RX ADMIN — PROPOFOL 50 MCG/KG/MIN: 10 INJECTION, EMULSION INTRAVENOUS at 01:01

## 2019-01-23 RX ADMIN — CEFAZOLIN 2 G: 330 INJECTION, POWDER, FOR SOLUTION INTRAMUSCULAR; INTRAVENOUS at 01:01

## 2019-01-23 RX ADMIN — BUPIVACAINE HYDROCHLORIDE AND EPINEPHRINE BITARTRATE 30 ML: 5; .0091 INJECTION, SOLUTION EPIDURAL; INTRACAUDAL; PERINEURAL at 01:01

## 2019-01-23 RX ADMIN — Medication: at 03:01

## 2019-01-23 RX ADMIN — SODIUM CHLORIDE 1000 ML: 0.9 INJECTION, SOLUTION INTRAVENOUS at 11:01

## 2019-01-23 NOTE — ANESTHESIA PROCEDURE NOTES
Infraclavicular Brachial Plexus Catheter    Patient location during procedure: pre-op   Block not for primary anesthetic.  Reason for block: at surgeon's request and post-op pain management   Post-op Pain Location: right wrist  Start time: 1/23/2019 12:36 PM  Timeout: 1/23/2019 12:36 PM   End time: 1/23/2019 1:00 PM  Staffing  Anesthesiologist: Heather Ghosh MD  Other anesthesia staff: Anthony Mock Jr., MD  Performed: other anesthesia staff   Preanesthetic Checklist  Completed: patient identified, site marked, surgical consent, pre-op evaluation, timeout performed, IV checked, risks and benefits discussed and monitors and equipment checked  Peripheral Block  Patient position: sitting  Prep: ChloraPrep and site prepped and draped  Patient monitoring: heart rate, cardiac monitor, continuous pulse ox, continuous capnometry and frequent blood pressure checks  Block type: infraclavicular  Laterality: right  Injection technique: continuous  Needle  Needle type: Tuohy   Needle gauge: 17 G  Needle length: 3.5 in  Needle localization: anatomical landmarks and ultrasound guidance  Catheter type: spring wound  Catheter size: 19 G  Test dose: lidocaine 1.5% with Epi 1-to-200,000 and negative   -ultrasound image captured on disc.  Assessment  Injection assessment: negative aspiration, negative parasthesia and local visualized surrounding nerve  Paresthesia pain: none  Heart rate change: no  Slow fractionated injection: yes  Additional Notes  VSS.  [facility] RN monitoring vitals throughout procedure.  Patient tolerated procedure well.

## 2019-01-23 NOTE — DISCHARGE INSTRUCTIONS
Having Arm Fracture Open Reduction and Internal Fixation (ORIF)  Open reduction and internal fixation (ORIF) is a type of treatment to fix a broken bone. It puts the pieces of a broken bone back together so they can heal. Open reduction means the bones are put back in place during surgery. Internal fixation means that special hardware is used to hold the bone pieces together. This helps the bone heal correctly. The procedure is done by an orthopedic surgeon. This is a doctor with special training in treating bone, joint, and muscle problems.  What to tell your healthcare provider  Make sure you tell the healthcare provider about all medicines you take, including over-the-counter medicines, such as aspirin. Tell him or her about all vitamins, herbs, and other supplements you take. Also tell the provider the last time you had something to eat or drink. And tell your healthcare provider if you:  · Have had any recent changes in your health, such as an infection or fever  · Are sensitive or allergic to any medicines, latex, tape, or anesthesia (local and general)  · Are pregnant or think you may be pregnant  Tests before your surgery  Before your surgery, you may need imaging tests. These may include ultrasound, X-rays, or MRI.  Getting ready for your surgery  ORIF often takes place as emergency surgery after an accident or injury. Youll have a physical exam. X-rays may be taken of your arm. You may also have other imaging tests. A healthcare provider will ask you questions. He or she will also check your heart and lungs.  In some cases, arm fracture ORIF is planned. You may need to stop taking some medicines before the procedure, such as blood thinners and aspirin. If you smoke, you may need to stop before your surgery. Smoking can delay healing. Talk with your healthcare provider if you need help to stop smoking.  Also make sure to:  · Ask a family member or friend to take you home from the hospital. You cannot  drive yourself.  · Plan some changes at home to help you recover. You may need help at home.  · Dont eat or drink after midnight the night before your surgery.  · Follow all other instructions from your healthcare provider.  You may be asked to sign a consent form that gives your permission to do the procedure. Read the form carefully. Ask questions if something is not clear.  On the day of surgery  Your surgeon will explain the details of your surgery. The preparation and surgery may take a couple of hours. In general, you can expect the following:  · You will likely have general anesthesia.This will prevent pain and make you sleep through the surgery. Or you may have regional anesthesia to numb the area and medicine to help you relax and sleep through the surgery.  · A healthcare provider watches your vital signs, like your heart rate and blood pressure, during the surgery.  · After cleaning the skin, your surgeon makes a cut (incision) through the skin and muscles of your arm. Or with some fractures, the surgeon makes an incision on the top of the shoulder.  · Your surgeon puts the pieces of your humerus back in place. This is the reduction.  · Next, your surgeon uses special screws, plates, wires, or nails to hold the bone pieces together. This is the fixation. It helps the bone heals correctly.  · The surgeon will make other repairs to the area as needed.  · The surgeon will close the layers of muscle and skin on your arm with stitches (sutures).  After your surgery  Talk with your surgeon about what you can expect after your surgery. You may go home the same day. Or you may stay overnight in the hospital. Before leaving the hospital, you will have X-rays taken of your arm. This is to check the repair.  You might have some fluid draining from your incision. This is normal. You will have some pain after the surgery. Your doctor will tell you what pain medicine you can take to help reduce the pain. Avoid certain  OTC medicines for pain as instructed. Some of these may interfere with bone healing. You can also use ice packs to help lessen pain and swelling.  You may be told to not move your arm for a while after your surgery. You may need to wear a splint for several weeks. You will get instructions about when and how you can move your arm. Your surgeon may also tell you to eat foods high in calcium and vitamin D to help with bone healing.  Follow-up care  Make sure to keep all of your follow-up appointments. You may need to have your stitches removed a week or so after your surgery.  You may have physical therapy to improve the strength and movement of your arm. The therapy may include treatments and exercises. The therapy improves your chances of a full recovery. Most people are able to return to all their normal activities within a few months.     When to call your healthcare provider  Call your health care provider right away if you have any of these:  · Fever of 100.4°F (38°C) or higher  · Redness, swelling, or fluid leaking from your incision that gets worse  · Pain that gets worse  · Loss of feeling in your arm or hand   Date Last Reviewed: 8/10/2015  © 8018-3945 [email or web address]. [address] [place] [address], [place] [address]. All rights reserved. This information is not intended as a substitute for professional medical care. Always follow your healthcare professional's instructions.

## 2019-01-23 NOTE — PROGRESS NOTES
I have personally taken the history and examined this patient. I agree with the resident's note as stated above. Plan for ORIF DR marcos.  I have explained the risks, benefits, and alternatives of the procedure to the patient in great detail. The patient voices understanding and all questions have been answered. The patient agrees with to proceed as planned. Consents were performed in clinic. Pt understands vit c us as well.

## 2019-01-23 NOTE — BRIEF OP NOTE
[facility]  Brief Operative Note     SUMMARY     Surgery Date: 1/23/2019     Surgeon(s) and Role:     * Lyndsey Carson MD - Primary     * Juan José Contreras MD - Resident - Assisting        Pre-op Diagnosis:  Closed fracture of distal end of right radius, unspecified fracture morphology, initial encounter [S52.501A]    Post-op Diagnosis:  Post-Op Diagnosis Codes:     * Closed fracture of distal end of right radius, unspecified fracture morphology, initial encounter [S52.501A]    Procedure(s) (LRB):  ORIF, FRACTURE, RADIUS, DISTAL right (Right)    Anesthesia: Regional    Description of the findings of the procedure: ORIF right distal radius    Findings/Key Components: ORIF right distal radius    Estimated Blood Loss: * No values recorded between 1/23/2019  1:44 PM and 1/23/2019  2:37 PM *         Specimens:   Specimen (12h ago, onward)    None          Discharge Note    SUMMARY     Admit Date: 1/23/2019    Discharge Date and Time:  01/23/2019    Hospital Course (synopsis of major diagnoses, care, treatment, and services provided during the course of the hospital stay): Patient presented for outpatient procedure, tolerated well, and was discharged home after appropriate recovery from anesthesia.       Final Diagnosis: Post-Op Diagnosis Codes:     * Closed fracture of distal end of right radius, unspecified fracture morphology, initial encounter [S52.501A]    Disposition: Home or Self Care    Follow Up/Patient Instructions:     Medications:  Reconciled Home Medications:      Medication List      CONTINUE taking these medications    ascorbic acid (vitamin C) 500 MG tablet  Commonly known as:  VITAMIN C  Take 1 tablet (500 mg total) by mouth once daily.     losartan 25 MG tablet  Commonly known as:  COZAAR  TAKE 1 TABLET (25 MG TOTAL) BY MOUTH ONCE DAILY.     ONE DAILY MULTIVITAMIN per tablet  Generic drug:  multivitamin  Take 1 tablet by mouth once daily.     oxyCODONE-acetaminophen  5-325 mg per tablet  Commonly known as:  PERCOCET  Take 1 tablet by mouth every 6 (six) hours as needed for Pain.     traMADol 50 mg tablet  Commonly known as:  ULTRAM  Take 1 tablet (50 mg total) by mouth every 6 (six) hours as needed for Pain.     triamterene-hydrochlorothiazide 37.5-25 mg 37.5-25 mg per capsule  Commonly known as:  DYAZIDE  TAKE 1 CAPSULE BY MOUTH ONCE DAILY.          Discharge Procedure Orders   Call MD for:  temperature >100.4     Call MD for:  persistent nausea and vomiting or diarrhea     Call MD for:  redness, tenderness, or signs of infection (pain, swelling, redness, odor or green/yellow discharge around incision site)     Call MD for:  difficulty breathing or increased cough     Call MD for:  severe persistent headache     Call MD for:  worsening rash     Call MD for:  persistent dizziness, light-headedness, or visual disturbances     Call MD for:  increased confusion or weakness     Leave dressing on - Keep it clean, dry, and intact until clinic visit     Weight bearing restrictions (specify):   Order Comments: Nonweight bearing operative extremity     Follow-up Information     Uyen Duff PA-C. Go on 2/6/2019.    Specialties:  Hand Surgery, Orthopedic Surgery  Why:  For wound re-check; 9:15 am, For wound re-check  Contact information:  [address]  [facility] [address]  [phone]

## 2019-01-23 NOTE — TRANSFER OF CARE
"Anesthesia Transfer of Care Note    Patient: Kelley Cruz    Procedure(s) Performed: Procedure(s) (LRB):  ORIF, FRACTURE, RADIUS, DISTAL right (Right)    Patient location: PACU    Anesthesia Type: regional and MAC    Transport from OR: Transported from OR on room air with adequate spontaneous ventilation    Post pain: adequate analgesia    Post assessment: no apparent anesthetic complications    Post vital signs: stable    Level of consciousness: awake, alert and oriented    Nausea/Vomiting: no nausea/vomiting    Complications: none    Transfer of care protocol was followed      Last vitals:   Visit Vitals  BP (!) 142/65 (BP Location: Left arm, Patient Position: Lying)   Pulse 74   Temp 37 °C (98.6 °F) (Oral)   Resp 14   Ht 5' 3" (1.6 m)   Wt 81.6 kg (180 lb)   SpO2 100%   Breastfeeding? No   BMI 31.89 kg/m²     "

## 2019-01-23 NOTE — ANESTHESIA PREPROCEDURE EVALUATION
01/23/2019  Kelley Cruz is a 78 y.o., female.    .anes  Pre-operative evaluation for Procedure(s) (LRB):  ORIF, FRACTURE, RADIUS, DISTAL right (Right)    Encounter Diagnosis   Name Primary?    Closed fracture of right distal radius        Review of patient's allergies indicates:   Allergen Reactions    Codeine      Other reaction(s): Headache    Pcn  [penicillins]      Other reaction(s): Nausea       No current facility-administered medications on file prior to encounter.      Current Outpatient Medications on File Prior to Encounter   Medication Sig Dispense Refill    ascorbic acid, vitamin C, (VITAMIN C) 500 MG tablet Take 1 tablet (500 mg total) by mouth once daily. 50 tablet 0    losartan (COZAAR) 25 MG tablet TAKE 1 TABLET (25 MG TOTAL) BY MOUTH ONCE DAILY. 90 tablet 1    multivitamin (MULTIVITAMIN) per tablet Take 1 tablet by mouth once daily.      oxyCODONE-acetaminophen (PERCOCET) 5-325 mg per tablet Take 1 tablet by mouth every 6 (six) hours as needed for Pain. 35 tablet 0    traMADol (ULTRAM) 50 mg tablet Take 1 tablet (50 mg total) by mouth every 6 (six) hours as needed for Pain. 20 tablet 0    triamterene-hydrochlorothiazide 37.5-25 mg (DYAZIDE) 37.5-25 mg per capsule TAKE 1 CAPSULE BY MOUTH ONCE DAILY. 90 capsule 2       Social History     Tobacco Use   Smoking Status Never Smoker   Smokeless Tobacco Never Used       Social History     Substance and Sexual Activity   Alcohol Use No       Patient Active Problem List   Diagnosis    Hypertension    Osteoporosis with fracture    Right inguinal hernia    Closed fracture of right distal radius       Past Surgical History:   Procedure Laterality Date    HYSTERECTOMY      TAHBSO for benign pelvic cystis mass    REPAIR-HERNIA-INGUINAL Right 11/4/2015    Performed by Joshua Goldberg, MD at [facility] OR [address] [place] [address]           No results for  input(s): HCT in the last 72 hours.  No results for input(s): PLT in the last 72 hours.  No results for input(s): K in the last 72 hours.  No results for input(s): CREATININE in the last 72 hours.  No results for input(s): GLU in the last 72 hours.  No results for input(s): PT in the last 72 hours.                    Anesthesia Evaluation         Review of Systems  Anesthesia Hx:  No problems with previous Anesthesia    Hematology/Oncology:  Hematology Normal   Oncology Normal     Cardiovascular:   Hypertension, well controlled Denies MI.    Denies Angina. Walks 3-5 miles frequently   Pulmonary:  Pulmonary Normal    Neurological:   Denies TIA. Denies CVA. Denies Seizures.    Endocrine:   Denies Diabetes.        Physical Exam  General:  Well nourished    Airway/Jaw/Neck:  Airway Findings: Mouth Opening: Normal Tongue: Normal  General Airway Assessment: Adult, Average  Mallampati: II  TM Distance: Normal, at least 6 cm  Jaw/Neck Findings:  Neck ROM: Normal ROM            Mental Status:  Mental Status Findings:  Cooperative, Alert and Oriented         Anesthesia Plan  Type of Anesthesia, risks & benefits discussed:  Anesthesia Type:  general, regional  Patient's Preference:   Intra-op Monitoring Plan:   Intra-op Monitoring Plan Comments:   Post Op Pain Control Plan:   Post Op Pain Control Plan Comments: As per surgeon's plan  Induction:   IV  Beta Blocker:  Patient is not currently on a Beta-Blocker (No further documentation required).       Informed Consent: Patient understands risks and agrees with Anesthesia plan.  Questions answered. Anesthesia consent signed with patient.  ASA Score: 2     Day of Surgery Review of History & Physical:    H&P update referred to the surgeon.         Ready For Surgery From Anesthesia Perspective.

## 2019-01-24 NOTE — ANESTHESIA POST-OP PAIN MANAGEMENT
Contacted pt at home to confirm PNC working well without issues. Pt stated PNC infusing without issues. She denied any additional questions or concerns at this time. I told patient to contact APS if any questions or concerns arose and stated that we would contact patient again tomorrow to confirm successful removal of PNC.

## 2019-01-24 NOTE — ANESTHESIA POSTPROCEDURE EVALUATION
"Anesthesia Post Evaluation    Patient: Kelley Cruz    Procedure(s) Performed: Procedure(s) (LRB):  ORIF, FRACTURE, RADIUS, DISTAL right (Right)    Final Anesthesia Type: general  Patient location during evaluation: PACU  Patient participation: Yes- Able to Participate  Level of consciousness: awake and alert and oriented  Post-procedure vital signs: reviewed and stable  Pain management: adequate  Airway patency: patent  PONV status at discharge: No PONV  Anesthetic complications: no      Cardiovascular status: blood pressure returned to baseline and hemodynamically stable  Respiratory status: unassisted  Hydration status: euvolemic  Follow-up not needed.        Visit Vitals  /67   Pulse 74   Temp 36.7 °C (98.1 °F) (Temporal)   Resp 17   Ht 5' 3" (1.6 m)   Wt 81.6 kg (180 lb)   SpO2 95%   Breastfeeding? No   BMI 31.89 kg/m²       Pain/Kay Score: Pain Rating Prior to Med Admin: 0 (1/23/2019  3:14 PM)  Kay Score: 10 (1/23/2019  3:43 PM)        "

## 2019-01-24 NOTE — OP NOTE
DATE OF PROCEDURE:  01/23/2019.    SERVICE:  Orthopedics.    ATTENDING SURGEON:  Lyndsey Carson M.D.    RESIDENT SURGEON:  Juan José Contreras M.D. (RES)    PREOPERATIVE DIAGNOSIS:  Right intraarticular displaced 3+ part distal radius   fracture.    POSTOPERATIVE DIAGNOSIS:  Right intraarticular displaced 3+ part distal radius   fracture.    PROCEDURES PERFORMED:  1.  Open reduction and internal fixation of right intraarticular 3+ part distal   radius fracture.  2.  Fluoro use less than 1 hour.  3.  Splint application, right upper extremity.    ANESTHESIA:  Regional MAC.    FLUIDS:  Lactated Ringer's.    BLOOD LOSS:  None.  No blood was given.    TOURNIQUET TIME:  Less than 1 hour.    PACKS AND DRAINS:  None.    IMPLANTS:  Acumed volar plate for distal radius with screws.    COMPLICATIONS:  None.    INDICATIONS FOR PROCEDURE:  Ms. Cruz is a 78-year-old female who fell on   an outstretched hand.  She sustained a volar Tristan's fracture, which is   unstable.  Therefore, after much discussion with the patient in clinic, we   elected for surgical intervention.  Risks and benefits were explained to the   patient in clinic.  Consents performed in clinic.    PROCEDURE IN DETAIL:  After the correct site was marked with the patient's   participation in the holding area, the patient underwent regional anesthesia.    The patient was brought to the Operating Room, placed in supine position,   underwent MAC anesthesia.  A well-padded nonsterile tourniquet was placed on the   right upper extremity.  The right upper extremity was prepped and draped in   normal sterile fashion.  A timeout was conducted for the correct site,   procedure, and patient to be indicated.  IV antibiotics were given to the   patient preoperatively.  After the arm was prepped and draped, the incision over   the FCR was maintained and the time-out had been conducted, the arm was   exsanguinated with an Esmarch.  Tourniquet was insufflated to 250  mmHg. An   incision was made down the FCR.  The FCR was gently retracted ulnarly out of the   way.  The fascia was then incised sharply and completely released.  Using the   surgeon's finger, the space of Parona was opened up.  Retractors were placed   into position to protect the neurovascular structures.  The pronator quadratus   was identified.  It was sharply elevated off the radius.  The fracture was   identified, gently debrided and reduction maneuver was obtained at this time, it   was held under the C-arm fluoroscopy to confirm that it was adequately reduced   in good position.  The right-sided Acumed distal volar plate was then placed in   position and K wire was held in position.  Multiple x-rays were taken to confirm   position of the plate.  Shaft screw was drilled and filled appropriately to   hold it in position.  Again, it was confirmed that there was good positioning   and good fracture reduction of the distal radius.  The remaining distal screws   were then drilled and filled appropriately, one compression screw, all the rest   were locking screws.  The compression screw did have a good bite and it was not   too long.  The remaining shaft screws were then drilled and filled   appropriately.  The multiple x-rays were taken including the 20-degree oblique   view, which showed that there was no hardware that had violated the articular   surface.  The wrist was placed through full flexion, full extension to confirm   that it was achievable and multiple x-rays were taken to confirm this.  DRUJ was   then assessed and showed that it was stable.  The area was irrigated out with   copious amounts of normal saline.  Vicryl, Monocryl and Dermabond closed the   skin.  Sterile dressing was applied.  Tourniquet was deflated.  Brisk capillary   refill ensued.  The patient was placed in a well-padded splint, tolerated the   procedure well, was brought to recovery area in stable condition.    POSTOPERATIVE PLAN FOR  THIS PATIENT:  Keep the dressing clean, dry and intact.    We will see the patient in two weeks' time.  Therapy to be initiated.  The   patient will be placed in a brace at that time as well as vitamin C to be   continued.            /lizette [id number] blank(s)        LES/HN  dd: 01/24/2019 07:59:31 (CST)  td: 01/24/2019 08:56:28 (CST)  Doc ID   #[id number]  Job ID #[id number]    CC:

## 2019-01-25 NOTE — ADDENDUM NOTE
Addendum  created 01/25/19 1319 by Mian Li MD    Intraprocedure Event edited, Sign clinical note

## 2019-01-25 NOTE — ANESTHESIA POST-OP PAIN MANAGEMENT
Called and spoke with patient. ONQ has been removed. No complaints. All questions answered.      Mian Li, CA-3  Beeper: [phone]

## 2019-02-06 ENCOUNTER — OFFICE VISIT (OUTPATIENT)
Dept: ORTHOPEDICS | Facility: CLINIC | Age: 79
End: 2019-02-06
Payer: MEDICARE

## 2019-02-06 VITALS
HEART RATE: 63 BPM | BODY MASS INDEX: 31.89 KG/M2 | SYSTOLIC BLOOD PRESSURE: 137 MMHG | WEIGHT: 180 LBS | DIASTOLIC BLOOD PRESSURE: 79 MMHG | HEIGHT: 63 IN

## 2019-02-06 DIAGNOSIS — Z47.89 ORTHOPEDIC AFTERCARE: ICD-10-CM

## 2019-02-06 DIAGNOSIS — S52.501A CLOSED FRACTURE OF DISTAL END OF RIGHT RADIUS, UNSPECIFIED FRACTURE MORPHOLOGY, INITIAL ENCOUNTER: Primary | ICD-10-CM

## 2019-02-06 PROCEDURE — 99024 POSTOP FOLLOW-UP VISIT: CPT | Mod: S$GLB,,, | Performed by: PHYSICIAN ASSISTANT

## 2019-02-06 PROCEDURE — 99999 PR PBB SHADOW E&M-EST. PATIENT-LVL IV: CPT | Mod: PBBFAC,,, | Performed by: PHYSICIAN ASSISTANT

## 2019-02-06 PROCEDURE — 97760 PR ORTHOTIC MGMT&TRAINJ INITIAL ENC EA 15 MINS: ICD-10-PCS | Mod: GP,S$GLB,, | Performed by: PHYSICIAN ASSISTANT

## 2019-02-06 PROCEDURE — 99999 PR PBB SHADOW E&M-EST. PATIENT-LVL IV: ICD-10-PCS | Mod: PBBFAC,,, | Performed by: PHYSICIAN ASSISTANT

## 2019-02-06 PROCEDURE — 97760 ORTHOTIC MGMT&TRAING 1ST ENC: CPT | Mod: GP,S$GLB,, | Performed by: PHYSICIAN ASSISTANT

## 2019-02-06 PROCEDURE — 99024 PR POST-OP FOLLOW-UP VISIT: ICD-10-PCS | Mod: S$GLB,,, | Performed by: PHYSICIAN ASSISTANT

## 2019-02-06 NOTE — PROGRESS NOTES
"Ms. Cruz is here today for a post-operative visit.  She is 14 days status post Open reduction and internal fixation of right intraarticular 3+ part distal radius fracture by Dr. Carson on 1/23/19. She reports that she is doing well.  Pain is mild, 4/10.  She is taking Aleve as needed for pain.  She is taking Vitamin C 500 mg daily.  She denies fever, chills, and sweats since the time of the surgery.     Physical exam:    Vitals:    02/06/19 0911   BP: 137/79   Pulse: 63   Weight: 81.6 kg (180 lb)   Height: 5' 3" (1.6 m)   PainSc:   4     Vital signs are stable, patient is afebrile.  Patient is well dressed and well groomed, no acute distress.  Alert and oriented to person, place, and time.  Post op dressing taken down.  Incision is clean, dry and intact. Mild edema of the fingers, hand, and wrist. There is no erythema or exudate.  There is no sign of any infection. She is NVI. Sutures removed without difficulty.  Decreased finger ROM.    Assessment: 14 days status post Open reduction and internal fixation of right intraarticular 3+ part distal radius fracture        Plan:  Kelley was seen today for post-op evaluation.    Diagnoses and all orders for this visit:    Closed fracture of distal end of right radius, unspecified fracture morphology, initial encounter    Orthopedic aftercare        - PO instruction reviewed and provided to patient  - Forearm brace provided (15 minutes spent preparing, fitting, and educating on brace)  - Discussed scar massage  - No lifting or weight bearing  - Start OT  - Follow up in 4 weeks  - Call with questions or concerns    "

## 2019-02-15 ENCOUNTER — CLINICAL SUPPORT (OUTPATIENT)
Dept: REHABILITATION | Facility: HOSPITAL | Age: 79
End: 2019-02-15
Attending: PHYSICIAN ASSISTANT
Payer: MEDICARE

## 2019-02-15 DIAGNOSIS — M25.60 DECREASED RANGE OF MOTION: ICD-10-CM

## 2019-02-15 DIAGNOSIS — M25.531 WRIST PAIN, RIGHT: ICD-10-CM

## 2019-02-15 DIAGNOSIS — M79.89 SWELLING OF RIGHT HAND: ICD-10-CM

## 2019-02-15 PROCEDURE — 97110 THERAPEUTIC EXERCISES: CPT

## 2019-02-15 PROCEDURE — 97166 OT EVAL MOD COMPLEX 45 MIN: CPT

## 2019-02-15 PROCEDURE — G8988 SELF CARE GOAL STATUS: HCPCS | Mod: CK

## 2019-02-15 PROCEDURE — G8987 SELF CARE CURRENT STATUS: HCPCS | Mod: CL

## 2019-02-15 PROCEDURE — 97018 PARAFFIN BATH THERAPY: CPT

## 2019-02-15 NOTE — PATIENT INSTRUCTIONS
"[facility], OCCUPATIONAL THERAPY  HOME EXERCISE PROGRAM     Complete the following massages for 2-3min each, 2x/day.                                            Complete the following exercises with 10 repetitions each, 3-5x/day.     AROM: Supination / Pronation   With your elbow by your side, turn your palm up then turn your palm down.     AROM: Wrist Flexion / Extension               Bend your wrist forward and back as far as possible.      AROM: Wrist Radial / Ulnar Deviation  Bend your wrist from side to side as far as possible.        Complete the following exercises with 10 repetitions each, 3-5x/day.     AROM: DIP Flexion / Extension  Pinch middle knuckle to prevent bending. Bend end knuckle until stretch is felt.   Hold 3 seconds. Relax. Straighten finger as far as possible.    AROM: PIP Flexion / Extension  Pinch bottom knuckle  to prevent bending. Actively bend middle knuckle until stretch is felt.   Hold 3 seconds. Relax. Straighten finger as far as possible.        AROM: Isolated MCP Flexion / Extension ("Wave")   Bend only your large, bottom knuckles. Hold 3 seconds. Keep the tips of your fingers straight. Straighten fingers.    AROM: Isolated IPJ Flexion / Extension ("Hook")  Bend only your middle and end knuckles. Hold 3 seconds.   Straighten your fingers.     AROM: MCP and PIP Flexion / Extension ("Straight Fist")  Bend your bottom and middle knuckles, keeping the tips of your fingers straight. Try to touch the pads of your fingers on your palm. Hold 3 seconds. Straighten your fingers.     AROM: Composite Flexion / Extension ("Full Fist")  Bend every joint in your hand into a fist. Hold 3 seconds. Straighten your fingers.     AROM: Composte Extension ("Finger Lifts")  Lift your finger off of the table one at a time. Hold 3 seconds. Relax your finger.    AROM: Abduction / Adduction  With hand flat on table, spread all fingers apart, then bring them together as close as " possible.        Complete the following exercises with 10 repetitions each, 3-5x/day.                                                                 Copyright © I. All rights reserved.     Therapist: DELVIN Del Toro

## 2019-02-15 NOTE — PLAN OF CARE
[facility]  Initial Evaluation     Name: Kelley Cruz  Clinic Number: [id number]    Medical Diagnosis: S52.501A (ICD-10-CM) - Closed fracture of distal end of right radius, unspecified fracture morphology, initial encounter  Date of Onset: 1/14/19  Date of Surgery: 1/23/19  Surgical Procedure: s/p ORIF of R DR marcos  Therapy Diagnosis:   Encounter Diagnoses   Name Primary?    Swelling of right hand     Wrist pain, right     Decreased range of motion      Precautions: Standard and Weightbearing; NWB     Physician: Jennifer Lewis PA; Dr. Lyndsey Carson  Physician Orders: eval and treat  Date of Return to MD: 3/7/19    Evaluation Date: 2/15/2019  Plan of Care Certification Period: 4/12/19    Visit #: 1/ Visits authorized: 20  Insurance Authorization period Expiration: 12/31/19    Time In:8:25 am  Time Out: 9:20 am  Total Billable Time: 55 minutes        Subjective     Involved Side:  right  Dominant Side: Right  Imaging: x-ray on 1/15/19 indicates:Left forearm and wrist: There is diffuse osteopenia in this 78-year-old woman.  There is an oblique fracture through the distal diametaphyseal is of the right radius; this may extend to the articular surface.  Ulnar styloid fracture is also present.  For radial and ulnar fracture show minimal displacement.  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  Left forearm AP and lateral views: I detect no fracture, dislocation, radiopaque retained foreign body, lytic or blastic lesion, erosion or chondrocalcinosis.  Spur is present at the dorsal aspect of the wrist, arising from the 1st row of carpal bones.  There may be osteoarthrosis at the 1st carpal-metacarpal joint, incompletely visualized on this study.  Ulnar styloid appears intact on the views provided.    X-ray on 1/23/19 indicates: New right radius surgical hardware projects in expected position.  Right radius and ulnar fractures now project in near anatomic alignment.   No new abnormality.    Mechanism of Injury: pt reported she tripped and fell  History of Current Condition: Pt tripped and fell, which fractured her R DR and ulnar styloid. Pt had surgery and is being referred to therapy for deficits.  Previous Therapy: no prior therapy    Pain:  Functional Pain Scale Rating 0-10:   5/10 at current  0/10 at best  5/10 at worst  Location: typically at R dorsal wrist  Description: Throbbing  Aggravating Factors: Bending and Flexing  Easing Factors: rest, elevation and Tylenol      Past Medical History/Physical Systems Review:   Kelley Cruz  has a past medical history of Cataract, Hypertension, Keloid cicatrix, and Osteoporosis.    Kelley Cruz  has a past surgical history that includes Hysterectomy and Open reduction and internal fixation (ORIF) of fracture of distal radius (Right, 1/23/2019).    Kelley has a current medication list which includes the following prescription(s): ascorbic acid (vitamin c), losartan, multivitamin, oxycodone-acetaminophen, tramadol, and triamterene-hydrochlorothiazide 37.5-25 mg.    Review of patient's allergies indicates:   Allergen Reactions    Codeine      Other reaction(s): Headache    Pcn  [penicillins]      Other reaction(s): Nausea          Patient's Goals for Therapy: to be able to use hand    Occupation:    Pt is retired.    Functional Limitations/Social History:   Pt PLOF independent. Pt lives alone. Pt is unable to hold things, lift, open things, write.  Unable to use R hand with ADLs. Limited with feeding, bathing, dressing, grooming. Impaired FM skills.       Objective     Observation/Appearance: pt with wrist brace intact, incision closed and healing well. Bruising still noted at volar wrist and forearm. Moderate swelling noted in hand and wrist. Pt somewhat guarded of R hand.    Edema. Measured in centimeters.  Mild edema noted in fingers as well   2/15/2019 2/15/2019    Left Right   Wrist Crease 16.5 19   DPC 19.3  20.7   MCPs 19.1 19.7         Elbow and Wrist ROM. Measured in degrees.   2/15/2019 2/15/2019    Left Right   Elbow Ext/Flex WFL 22/WNL   Supination/Pronation WFL 10/75   Wrist Ext/Flex 60/70 0/36   Wrist RD/UD 12/42 8/8     Hand ROM. Measured in degrees.   2/15/2019    Right       Index: MP  15/63              PIP     55              DIP 17              CORONA 120       Long:  MP 12/60              PIP 60              DIP 17              CORONA 125       Ring:   MP 10/55              PIP 57              DIP 15              CORONA 117       Small:  MP 10/50               PIP 52               DIP 20              CORONA 112       Thumb: MP 32                IP 15       Rad ADD/ABD 24/34       Pal ADD/ABD 30/40          Opposition To LF DIP, ~5 cm from DPC      Strength (Dynamometer) and Pinch Strength (Pinch Gauge)  Measured in pounds.   2/15/2019 2/15/2019    Left Right   Rung II deferred deferred   Ruffin Pinch deferred deferred   3pt Pinch deferred deferred   2pt Pinch deferred deferred     Sensation: denies numbness and tingling    Manual Muscle Test: deferred      CMS Impairment/Limitation/Restriction for FOTO Hand Survey  Therapist reviewed FOTO scores for Kelley Cruz.  FOTO documents entered into [employer] - see Media section.    Intake Limitation Score: 62% - 2/15/2019  Category: Self Care    Current : CL = least 60% but < 80% impaired, limited or restricted  Goal: CK = at least 40% but < 60% impaired, limited or restricted  Discharge: tba         Treatment     Treatment Time In: 8:55 am  Treatment Time Out: 9:20 am  Total Treatment time separate from Evaluation time:25 min    Kelley received the following supervised modalities after being cleared for contradictions for 10 minutes:   -Patient received paraffin bath to R hand(s)  to increase blood flow, circulation, pain management and for tissue elasticity prior to therex.       Kelley received therapeutic exercises for 15 minutes including:  -pt instructed on and  performed HEP, including wrist AROM, sup/pro AROM, thumb circumduction, thumb ext/flex, opposition, thumb abd/add, tendon glides, joint blocking, finger abd/add, digit ext, x 5-10 reps each        Home Exercise Program/Education:  Issued HEP (see patient instructions in EMR) and educated on modality use for pain management . Exercises were reviewed and Kelley was able to demonstrate them prior to the end of the session.   Pt received a written copy of exercises to perform at home. Kelley demonstrated good  understanding of the education provided.  Pt was advised to perform these exercises free of pain, and to stop performing them if pain occurs.    Patient/Family Education: role of OT, goals for OT, scheduling/cancellations - pt verbalized understanding. Discussed insurance limitations with patient.    Additional Education provided: edema management, scar management, elevation of hand, and brace wear      Assessment     Kelley Cruz is a 78 y.o. female referred to outpatient occupational/hand therapy and presents with a medical diagnosis of s/p ORIF of R DR fx, resulting in Decreased ROM, Decreased  strength, Decreased pinch strength, Decreased muscle strength, Decreased functional hand use, Increased pain, Edema, Joint Stiffness and Scar Adhesions and demonstrates limitations as described in the chart below. Following expanded medical record review it is determined that pt will benefit from occupational therapy services in order to maximize pain free and/or functional use of right hand. The following goals were discussed with the patient and patient is in agreement with them as to be addressed in the treatment plan. The patient's rehab potential is Excellent.     Anticipated barriers to occupational therapy: none  Pt has no cultural, educational or language barriers to learning provided.    Profile and History Assessment of Occupational Performance Level of Clinical Decision Making Complexity Score    Occupational Profile:   Kelely Cruz is a 78 y.o. female who lives alone and is currently retired. Dominant hand affected    Kelley Cruz has difficulty with  feeding, bathing, grooming and dressing  driving/transportation management, phone/computer use and housework/household chores  affecting his/her daily functional abilities. His/her main goal for therapy is to be able to use hand.     Previous functional status: Independent with all ADLs.      Comorbidities:   See PMH and Physical Systems Review Section above.    Medical and Therapy History Review:   Expanded               Performance Deficits    Physical:  Joint Mobility  Muscle Power/Strength  Muscle Endurance  Skin Integrity/Scar Formation  Edema   Strength  Pinch Strength  Fine Motor Coordination  Pain    Cognitive:  No Deficits    Psychosocial:    No Deficits     Clinical Decision Making:  moderate    Assessment Process:  Detailed Assessments    Modification/Need for Assistance:  Minimal-Moderate Modifications/Assistance    Intervention Selection:  Multiple Treatment Options       moderate  Based on PMHX, co morbidities , data from assessments and functional level of assistance required with task and clinical presentation directly impacting function.         Goals   The following goals were discussed with the patient and patient is in agreement with them as to be addressed in the treatment plan.   Long Term Goals (LTGs); to be met by discharge.  LTG #1: Pt will report a pain level of 0 out of 10 with daily tasks and ADLs   LTG #2: Pt will demo improved FOTO score by at least 20 points.   LTG #3: Pt will return to prior level of function for ADLs and household management.   LTG#4: Pt will demonstrate little to no edema in R hand and wrist for better use during ADLs  LTG #5:  and pinch to be assessed and goals set accordingly    Short Term Goals (STGs); to be met within 4 weeks (3/15/19).  STG #1a: Pt will report 2 out of 10  pain level at the worst with writing or ADLs.  STG #2a: Pt will demonstrate improved R wrist AROM by at least 10-15* for better use during daily tasks  STG #3a: Pt will demonstrate independence with issued HEP.   STG #3b: Pt will demo improved R hand digits CORONA by at least 15-20 degrees needed to aid with grasping objects and holding things.    Plan     Outpatient occupational therapy 2 times weekly for 8 weeks during the certification period from 2/15/2019 to 4/12/19.    Treatment to include: Paraffin, Fluidotherapy, Manual therapy/joint mobilizations, Modalities for pain management, Therapeutic exercises/activities., Strengthening, Edema Control and Scar Management, as well as any other treatments deemed necessary based on the patient's needs or progress.     Therapist: DELVIN Del Toro     I certify the need for these services furnished under this plan of treatment and while under my care    ____________________________________                         __________________  Physician/Referring Practitioner                                               Date of Signature

## 2019-02-22 ENCOUNTER — CLINICAL SUPPORT (OUTPATIENT)
Dept: REHABILITATION | Facility: HOSPITAL | Age: 79
End: 2019-02-22
Payer: MEDICARE

## 2019-02-22 DIAGNOSIS — M25.531 WRIST PAIN, RIGHT: ICD-10-CM

## 2019-02-22 DIAGNOSIS — M79.89 SWELLING OF RIGHT HAND: ICD-10-CM

## 2019-02-22 DIAGNOSIS — M25.60 DECREASED RANGE OF MOTION: ICD-10-CM

## 2019-02-22 PROCEDURE — 97110 THERAPEUTIC EXERCISES: CPT

## 2019-02-22 PROCEDURE — 97140 MANUAL THERAPY 1/> REGIONS: CPT

## 2019-02-22 PROCEDURE — 97018 PARAFFIN BATH THERAPY: CPT | Mod: 59

## 2019-02-22 NOTE — PROGRESS NOTES
"  Occupational Therapy Daily Treatment Note      Date: 2/22/2019  Name: Kelley Cruz  Clinic Number: [id number]    Therapy Diagnosis:   Encounter Diagnoses   Name Primary?    Swelling of right hand     Wrist pain, right     Decreased range of motion      Medical Diagnosis: S52.501A (ICD-10-CM) - Closed fracture of distal end of right radius, unspecified fracture morphology, initial encounter  Date of Onset: 1/14/19  Date of Surgery: 1/23/19  Surgical Procedure: s/p ORIF of R DR fx    Precautions: Standard and Weightbearing; NWB      Physician: Jennifer Lewis PA; Dr. Lyndsey Carson  Physician Orders: eval and treat  Date of Return to MD: 3/7/19     Evaluation Date: 2/15/2019  Plan of Care Certification Period: 4/12/19     Visit #: 1/ Visits authorized: 20  Insurance Authorization period Expiration: 12/31/19    Time In: 9:30 am  Time Out: 10:15 am  Total Billable Time:  minutes      Subjective     Pt reports: "I think my hand is a little more swollen today. I've been trying to do my exercises. I've probably only done them about 2x/day though. It doesn't hurt too much. But it's stiff."  she was compliant with home exercise program given last session. Reviewed performing HEP ~4x/day  Response to previous treatment:good, no adverse effects  Functional change: cont to be significantly limited functionally with R hand    Pain: 1-2/10  Location: right hand/wrist, reports pain in thumb    Objective     Pt is 4 weeks 2 days post op    Pt seen by OT this session. Treatment consisted of the following:    Kelley received the following supervised modalities after being cleared for contradictions for 10 minutes:   -Patient received paraffin bath to R hand(s)  to increase blood flow, circulation, pain management and for tissue elasticity prior to therex.         Kelley received the following manual therapy techniques for 20 minutes:   MT: provided STM, including CFM, lymphatic drainage technique & scar management to " R hand and wrist. Joint mobilizations to include: finger IP jts, grade 1-2, all planes functional glide. Pt received PROM to digits, isolated and composite x 10 reps each. Gentle PROM to wrist and forearm as tolerated x 10 reps each.       Kelley received therapeutic exercises for 15 minutes including: (10 min 1:1  AROM Wrist  Ext/flx  RD/UD   X 10 reps each    Wave, hook, straight fist, composite fist, finger spreads, finger lifts,  opposition   X 10 reps each    AROM   Sup/pro X 10 reps each           Home Exercises and Education Provided     Education provided: reviewed HEP and frequency it should be performed, reviewed scar management and edema management  - Progress towards goals     Written Home Exercises Provided: Patient instructed to cont prior HEP.  Exercises were reviewed and Kelley was able to demonstrate them prior to the end of the session.  Kelley demonstrated good  understanding of the education provided.   .   See EMR under Patient Instructions for exercises provided prior visit.     Assessment       Kelley is progressing fairly well towards her goals and there are no updates to goals at this time. Pt prognosis continues as Excellent. Pt cont to present with edema and stiffness in fingers and wrist. Improved wrist and finger AROM noted this date. Pt cont to be unable to make full fist and still only opposes to LF. Able to passively move fingers into composite fist.  Wrist ext and supination most limited. Pt with good participation and motivation.  Pt will continue to benefit from skilled outpatient occupational therapy to address the deficits listed in the problem list on initial evaluation, provide pt/family education and to maximize pt's level of independence in the home and community environment.     Anticipated barriers to continued occupational therapy: none.    Pt's spiritual, cultural and educational needs considered and pt agreeable to plan of care and goals.    Goals     The following goals  were discussed with the patient and patient is in agreement with them as to be addressed in the treatment plan.   Long Term Goals (LTGs); to be met by discharge.  LTG #1: Pt will report a pain level of 0 out of 10 with daily tasks and ADLs   LTG #2: Pt will demo improved FOTO score by at least 20 points.   LTG #3: Pt will return to prior level of function for ADLs and household management.   LTG#4: Pt will demonstrate little to no edema in R hand and wrist for better use during ADLs  LTG #5:  and pinch to be assessed and goals set accordingly     Short Term Goals (STGs); to be met within 4 weeks (3/15/19).  STG #1a: Pt will report 2 out of 10 pain level at the worst with writing or ADLs.  STG #2a: Pt will demonstrate improved R wrist AROM by at least 10-15* for better use during daily tasks  STG #3a: Pt will demonstrate independence with issued HEP.   STG #3b: Pt will demo improved R hand digits CORONA by at least 15-20 degrees needed to aid with grasping objects and holding things      Plan     Cont OT poc    Discussed Plan of Care with patient: Yes  Updates/Grading for next session: progress as tolerated with ROM, and cont with scar and edema management    DELVIN Del Toro

## 2019-02-26 ENCOUNTER — CLINICAL SUPPORT (OUTPATIENT)
Dept: REHABILITATION | Facility: HOSPITAL | Age: 79
End: 2019-02-26
Payer: MEDICARE

## 2019-02-26 ENCOUNTER — PES CALL (OUTPATIENT)
Dept: ADMINISTRATIVE | Facility: CLINIC | Age: 79
End: 2019-02-26

## 2019-02-26 DIAGNOSIS — M79.89 SWELLING OF RIGHT HAND: ICD-10-CM

## 2019-02-26 DIAGNOSIS — M25.531 WRIST PAIN, RIGHT: ICD-10-CM

## 2019-02-26 DIAGNOSIS — M25.60 DECREASED RANGE OF MOTION: ICD-10-CM

## 2019-02-26 PROCEDURE — 97140 MANUAL THERAPY 1/> REGIONS: CPT

## 2019-02-26 PROCEDURE — 97110 THERAPEUTIC EXERCISES: CPT

## 2019-02-26 NOTE — PROGRESS NOTES
"  Occupational Therapy Daily Treatment Note      Date: 2/26/2019  Name: Kelley Cruz  Clinic Number: [id number]    Therapy Diagnosis:   Encounter Diagnoses   Name Primary?    Swelling of right hand     Wrist pain, right     Decreased range of motion      Medical Diagnosis: S52.501A (ICD-10-CM) - Closed fracture of distal end of right radius, unspecified fracture morphology, initial encounter  Date of Onset: 1/14/19  Date of Surgery: 1/23/19  Surgical Procedure: s/p ORIF of R DR fx    Precautions: Standard and Weightbearing; NWB      Physician: Jennifer Lewis PA; Dr. Lyndsey Carson  Physician Orders: eval and treat  Date of Return to MD: 3/7/19     Evaluation Date: 2/15/2019  Plan of Care Certification Period: 4/12/19     Visit #: 3/ Visits authorized: 20  Insurance Authorization period Expiration: 12/31/19    Time In: 9:20 am  Time Out: 10:15 am  Total Billable Time: 50 minutes      Subjective     Pt reports: "I am doing the exercises."  she was compliant with home exercise program given last session. Reviewed performing HEP ~4x/day  Response to previous treatment:good, no adverse effects  Functional change: cont to be significantly limited functionally with R hand    Pain: 1-2/10  Location: right hand/wrist, reports pain in thumb    Objective     Pt is 4 weeks 6 days post op    Pt seen by OT this session. Treatment consisted of the following:    Kelley received the following supervised modalities after being cleared for contradictions for 10 minutes:   -Patient received paraffin bath to R hand(s)  to increase blood flow, circulation, pain management and for tissue elasticity prior to therex.         Kelley received the following manual therapy techniques for 15 minutes:   MT: provided STM, including CFM, lymphatic drainage technique & scar management to R hand and wrist. Joint mobilizations to include: finger IP jts, grade 1-2, all planes functional glide. Pt received PROM to digits, isolated and " composite x 10 reps each. Gentle PROM to wrist and forearm as tolerated x 10 reps each.       Kelley received therapeutic exercises for 25 minutes including:   AROM Wrist  Ext/flx  RD/UD   X 10 reps each    Wave, hook, straight fist, composite fist, finger spreads, finger lifts,  opposition   X 10 reps each    AROM   Sup/pro X 10 reps each   Place and hold for composite fist X 10 reps with 5 sec hold   Wrist flex/ext with unweighted dowel X 10 reps   Supination wheel:  Sup/pro  Flex/ext X 2 min each           Home Exercises and Education Provided     Education provided: reviewed HEP and frequency it should be performed, reviewed scar management and edema management  - Progress towards goals     Written Home Exercises Provided: Patient instructed to cont prior HEP.  Exercises were reviewed and Kelley was able to demonstrate them prior to the end of the session.  Kelley demonstrated good  understanding of the education provided.   .   See EMR under Patient Instructions for exercises provided prior visit.     Assessment       Kelley is progressing fairly well towards her goals and there are no updates to goals at this time. Pt prognosis continues as Excellent. Pt cont to present with edema and stiffness in fingers and wrist, but improving. Improved wrist and finger AROM noted this date. Pt cont to be unable to make full fist, but was able to oppose to SF this date. Able to passively move fingers into composite fist.  Wrist ext and supination most limited still. Pt with good participation and motivation.  Pt reports compliance with HEP. Pt will continue to benefit from skilled outpatient occupational therapy to address the deficits listed in the problem list on initial evaluation, provide pt/family education and to maximize pt's level of independence in the home and community environment.     Anticipated barriers to continued occupational therapy: none.    Pt's spiritual, cultural and educational needs considered and pt  agreeable to plan of care and goals.    Goals     The following goals were discussed with the patient and patient is in agreement with them as to be addressed in the treatment plan.   Long Term Goals (LTGs); to be met by discharge.  LTG #1: Pt will report a pain level of 0 out of 10 with daily tasks and ADLs   LTG #2: Pt will demo improved FOTO score by at least 20 points.   LTG #3: Pt will return to prior level of function for ADLs and household management.   LTG#4: Pt will demonstrate little to no edema in R hand and wrist for better use during ADLs  LTG #5:  and pinch to be assessed and goals set accordingly     Short Term Goals (STGs); to be met within 4 weeks (3/15/19).  STG #1a: Pt will report 2 out of 10 pain level at the worst with writing or ADLs.  STG #2a: Pt will demonstrate improved R wrist AROM by at least 10-15* for better use during daily tasks  STG #3a: Pt will demonstrate independence with issued HEP.   STG #3b: Pt will demo improved R hand digits CORONA by at least 15-20 degrees needed to aid with grasping objects and holding things      Plan     Cont OT poc    Discussed Plan of Care with patient: Yes  Updates/Grading for next session: progress as tolerated with ROM, and cont with scar and edema management    DELVIN Del Toro

## 2019-02-27 RX ORDER — LOSARTAN POTASSIUM 25 MG/1
25 TABLET ORAL DAILY
Qty: 90 TABLET | Refills: 1 | Status: SHIPPED | OUTPATIENT
Start: 2019-02-27 | End: 2019-09-17 | Stop reason: SDUPTHER

## 2019-03-01 ENCOUNTER — CLINICAL SUPPORT (OUTPATIENT)
Dept: REHABILITATION | Facility: HOSPITAL | Age: 79
End: 2019-03-01
Payer: MEDICARE

## 2019-03-01 DIAGNOSIS — M79.89 SWELLING OF RIGHT HAND: ICD-10-CM

## 2019-03-01 DIAGNOSIS — M25.531 WRIST PAIN, RIGHT: ICD-10-CM

## 2019-03-01 DIAGNOSIS — M25.60 DECREASED RANGE OF MOTION: ICD-10-CM

## 2019-03-01 PROCEDURE — 97110 THERAPEUTIC EXERCISES: CPT

## 2019-03-01 PROCEDURE — 97018 PARAFFIN BATH THERAPY: CPT

## 2019-03-01 PROCEDURE — 97140 MANUAL THERAPY 1/> REGIONS: CPT

## 2019-03-01 NOTE — PROGRESS NOTES
"  Occupational Therapy Daily Treatment Note      Date: 3/1/2019  Name: Kelley Cruz  Clinic Number: [id number]    Therapy Diagnosis:   Encounter Diagnoses   Name Primary?    Swelling of right hand     Wrist pain, right     Decreased range of motion      Medical Diagnosis: S52.501A (ICD-10-CM) - Closed fracture of distal end of right radius, unspecified fracture morphology, initial encounter  Date of Onset: 1/14/19  Date of Surgery: 1/23/19  Surgical Procedure: s/p ORIF of R DR fx    Precautions: Standard and Weightbearing; NWB      Physician: Jennifer Lewis PA; Dr. Lyndsey Carson  Physician Orders: eval and treat  Date of Return to MD: 3/7/19     Evaluation Date: 2/15/2019  Plan of Care Certification Period: 4/12/19     Visit #: 4/ Visits authorized: 20 **FOTO next session  Insurance Authorization period Expiration: 12/31/19    Time In: 9:20 am  Time Out: 10:15 am  Total Billable Time: 55 minutes      Subjective     Pt reports: "I am doing the exercises. I think it's doing a little better." Pt reports she has not been using R hand at all for light tasks. Encouraged pt to start using her R hand for light activities, such as grooming and writing.  she was compliant with home exercise program given last session. Reviewed performing HEP ~4x/day  Response to previous treatment:good, no adverse effects  Functional change: cont to be significantly limited functionally with R hand    Pain: 0/10  Location: right hand/wrist, reports pain in thumb    Objective     Pt is 5 weeks 2 days post op    Pt seen by OT this session. Treatment consisted of the following:    Kelley received the following supervised modalities after being cleared for contradictions for 10 minutes:   -Patient received paraffin bath to R hand(s)  to increase blood flow, circulation, pain management and for tissue elasticity prior to therex.         Kelley received the following manual therapy techniques for 15 minutes:   MT: provided STM, " including CFM, lymphatic drainage technique & scar management to R hand and wrist. Joint mobilizations to include: finger IP jts, grade 1-2, all planes functional glide. Pt received PROM to digits, isolated and composite x 10 reps each. Gentle PROM to wrist and forearm as tolerated x 10 reps each.       Kelley received therapeutic exercises for 30 minutes including: (15 min 1:1, 1 no charge)  AROM Wrist  Ext/flx  RD/UD   X 10 reps each    Wave, hook, straight fist, composite fist, finger spreads, finger lifts,  opposition   X 10 reps each    AROM   Sup/pro X 10 reps each   Place and hold for composite fist X 10 reps with 5 sec hold   Wrist flex/ext with unweighted dowel X 10 reps   Sup/pro with medium dowel (0#) X 10 reps   Supination wheel:  Sup/pro  Flex/ext X 2 min each (not today)   Manual dexterity task with R hand, with large glass beads and dice X 1 container each           Home Exercises and Education Provided     Education provided: reviewed HEP and frequency it should be performed, reviewed scar management and edema management. Pt given tubigrip compression sleeve for hand and wrist.  - Progress towards goals     Written Home Exercises Provided: Patient instructed to cont prior HEP.  Exercises were reviewed and Kelley was able to demonstrate them prior to the end of the session.  Kelley demonstrated good  understanding of the education provided.   .   See EMR under Patient Instructions for exercises provided prior visit.     Assessment       Kelley is progressing fairly well towards her goals and there are no updates to goals at this time. Pt prognosis continues as Excellent. Pt cont to present with edema and stiffness in fingers and wrist, but improving. Able to nearly make full fist today actively. Full fist noted passively. Wrist and forearm AROM improving as well.    Wrist ext and supination most limited still. Pt with good participation and motivation.  Good compliance with HEP. Pt reports compliance with  HEP. Pt will continue to benefit from skilled outpatient occupational therapy to address the deficits listed in the problem list on initial evaluation, provide pt/family education and to maximize pt's level of independence in the home and community environment.     Anticipated barriers to continued occupational therapy: none.    Pt's spiritual, cultural and educational needs considered and pt agreeable to plan of care and goals.    Goals     The following goals were discussed with the patient and patient is in agreement with them as to be addressed in the treatment plan.   Long Term Goals (LTGs); to be met by discharge.  LTG #1: Pt will report a pain level of 0 out of 10 with daily tasks and ADLs   LTG #2: Pt will demo improved FOTO score by at least 20 points.   LTG #3: Pt will return to prior level of function for ADLs and household management.   LTG#4: Pt will demonstrate little to no edema in R hand and wrist for better use during ADLs  LTG #5:  and pinch to be assessed and goals set accordingly     Short Term Goals (STGs); to be met within 4 weeks (3/15/19).  STG #1a: Pt will report 2 out of 10 pain level at the worst with writing or ADLs.  STG #2a: Pt will demonstrate improved R wrist AROM by at least 10-15* for better use during daily tasks  STG #3a: Pt will demonstrate independence with issued HEP.   STG #3b: Pt will demo improved R hand digits CORONA by at least 15-20 degrees needed to aid with grasping objects and holding things      Plan     Cont OT poc    Discussed Plan of Care with patient: Yes  Updates/Grading for next session: progress as tolerated with ROM, and cont with scar and edema management    DELVIN Del Toro        (0) independent

## 2019-03-06 ENCOUNTER — CLINICAL SUPPORT (OUTPATIENT)
Dept: REHABILITATION | Facility: HOSPITAL | Age: 79
End: 2019-03-06
Attending: PHYSICIAN ASSISTANT
Payer: MEDICARE

## 2019-03-06 DIAGNOSIS — M25.531 WRIST PAIN, RIGHT: ICD-10-CM

## 2019-03-06 DIAGNOSIS — M79.89 SWELLING OF RIGHT HAND: ICD-10-CM

## 2019-03-06 DIAGNOSIS — M25.60 DECREASED RANGE OF MOTION: ICD-10-CM

## 2019-03-06 PROCEDURE — 97140 MANUAL THERAPY 1/> REGIONS: CPT

## 2019-03-06 PROCEDURE — 97110 THERAPEUTIC EXERCISES: CPT

## 2019-03-06 PROCEDURE — 97018 PARAFFIN BATH THERAPY: CPT | Mod: 59

## 2019-03-06 NOTE — PROGRESS NOTES
"  Occupational Therapy Daily Treatment Note      Date: 3/6/2019  Name: Kelley Cruz  Clinic Number: [id number]    Therapy Diagnosis: R Distal radius fracture with ORIF 1/23/2019   Encounter Diagnoses   Name Primary?    Swelling of right hand     Wrist pain, right     Decreased range of motion      Medical Diagnosis: S52.501A (ICD-10-CM) - Closed fracture of distal end of right radius, unspecified fracture morphology, initial encounter  Date of Onset: 1/14/19  Date of Surgery: 1/23/19  Surgical Procedure: s/p ORIF of R DR fx    Precautions: Standard and Weightbearing; NWB      Physician: Jennifer Lewis PA; Dr. Lyndsey Carson  Physician Orders: eval and treat  Date of Return to MD: 3/7/19     Evaluation Date: 2/15/2019  Plan of Care Certification Period: 4/12/19     Visit #: 4/ Visits authorized: 20 **FOTO next session  Insurance Authorization period Expiration: 12/31/19    Time In: 9:20 am  Time Out: 10:05 am  Total Billable Time: 45 minutes      Subjective     Pt reports: "I wear the brace most of the time.  My fingers are really stiff and cold this morning"   Pt reports she has not been using R hand at all for light tasks. Encouraged pt to start using her R hand for light activities, such as grooming and writing.  Encouraged gradual weaning from brace at home for light activity and ROM but continue orthosis use when out of house or for lifting, carrying.   she was compliant with home exercise program given last session. Reviewed performing HEP ~4x/day  Response to previous treatment:good, no adverse effects  Functional change: cont to be significantly limited functionally with R hand    Pain: 0/10  Location: right hand/wrist, reports stiffness this am secondary to cold weather     Objective     Pt is 6 weeks  post op this day     Pt seen by OT this session. Treatment consisted of the following:    Kelley received the following supervised modalities after being cleared for contradictions for 10 " minutes:   -Patient received paraffin bath to R hand(s)  to increase blood flow, circulation, pain management and for tissue elasticity prior to therex.         Kelley received the following manual therapy techniques for 15 minutes:   MT: provided STM, including CFM, lymphatic drainage technique & scar management to R hand and wrist. Joint mobilizations to include: finger IP jts, grade 1-2, all planes functional glide. Pt received PROM to digits, isolated and composite x 10 reps each.  Gentle PROM to wrist and forearm as tolerated x 10 reps each.         Kelley received therapeutic exercises for 30 minutes including: (15 min 1:1, 1 no charge)  AROM Wrist  Ext/flx  RD/UD   X 10 reps each    Wave, hook, straight fist, composite fist, finger spreads, finger lifts,  opposition   X 10 reps each    AROM   Sup/pro X 10 reps each   Place and hold for composite fist X 10 reps with 5 sec hold   Wrist flex/ext with unweighted dowel X 10 reps   Sup/pro with medium dowel (0#) X 10 reps   Supination wheel:  Sup/pro  Flex/ext X 2 min each (not today)     Added prayer stretch for wrist extension x 10 reps and for AAROM for wrist flex/extension (fishtail) x 10 reps.        Home Exercises and Education Provided     Education provided: reviewed HEP and frequency it should be performed, reviewed scar management and edema management. Pt given tubigrip compression sleeve for hand and wrist.  - Progress towards goals     Written Home Exercises Provided: Patient instructed to cont prior HEP.  Exercises were reviewed and Kelley was able to demonstrate them prior to the end of the session.  Kelley demonstrated good  understanding of the education provided.   .   See EMR under Patient Instructions for exercises provided prior visit.     Assessment       Kelley is progressing fairly well towards her goals and there are no updates to goals at this time. Pt prognosis continues as Excellent. Pt cont to present with mild edema and stiffness in fingers  and wrist, but improving.  Able to nearly make full fist today actively. Full fist noted passively. Wrist and forearm AROM improving as well.    Wrist ext and supination most limited.   Pt with good participation and motivation.  Good compliance with HEP. Pt reports compliance with HEP. Pt will continue to benefit from skilled outpatient occupational therapy to address the deficits listed in the problem list on initial evaluation, provide pt/family education and to maximize pt's level of independence in the home and community environment.     Anticipated barriers to continued occupational therapy: none.    Pt's spiritual, cultural and educational needs considered and pt agreeable to plan of care and goals.    Goals     The following goals were discussed with the patient and patient is in agreement with them as to be addressed in the treatment plan.   Long Term Goals (LTGs); to be met by discharge.  LTG #1: Pt will report a pain level of 0 out of 10 with daily tasks and ADLs   LTG #2: Pt will demo improved FOTO score by at least 20 points.   LTG #3: Pt will return to prior level of function for ADLs and household management.   LTG#4: Pt will demonstrate little to no edema in R hand and wrist for better use during ADLs  LTG #5:  and pinch to be assessed and goals set accordingly     Short Term Goals (STGs); to be met within 4 weeks (3/15/19).  STG #1a: Pt will report 2 out of 10 pain level at the worst with writing or ADLs.  STG #2a: Pt will demonstrate improved R wrist AROM by at least 10-15* for better use during daily tasks  STG #3a: Pt will demonstrate independence with issued HEP.   STG #3b: Pt will demo improved R hand digits CORONA by at least 15-20 degrees needed to aid with grasping objects and holding things      Plan     Cont OT poC   2/15/219 to 4/12/2019     Discussed Plan of Care with patient: Yes  Updates/Grading for next session: progress as tolerated with ROM, and cont with scar and edema  management    DELVIN Pratt, CHT

## 2019-03-07 ENCOUNTER — HOSPITAL ENCOUNTER (OUTPATIENT)
Dept: RADIOLOGY | Facility: OTHER | Age: 79
Discharge: HOME | End: 2019-03-07
Attending: PHYSICIAN ASSISTANT
Payer: MEDICARE

## 2019-03-07 ENCOUNTER — OFFICE VISIT (OUTPATIENT)
Dept: ORTHOPEDICS | Facility: CLINIC | Age: 79
End: 2019-03-07
Payer: MEDICARE

## 2019-03-07 VITALS
SYSTOLIC BLOOD PRESSURE: 134 MMHG | DIASTOLIC BLOOD PRESSURE: 78 MMHG | WEIGHT: 180 LBS | HEART RATE: 60 BPM | HEIGHT: 63 IN | BODY MASS INDEX: 31.89 KG/M2

## 2019-03-07 DIAGNOSIS — S52.501A CLOSED FRACTURE OF DISTAL END OF RIGHT RADIUS, UNSPECIFIED FRACTURE MORPHOLOGY, INITIAL ENCOUNTER: Primary | ICD-10-CM

## 2019-03-07 DIAGNOSIS — Z47.89 ORTHOPEDIC AFTERCARE: ICD-10-CM

## 2019-03-07 DIAGNOSIS — S52.501A CLOSED FRACTURE OF DISTAL END OF RIGHT RADIUS, UNSPECIFIED FRACTURE MORPHOLOGY, INITIAL ENCOUNTER: ICD-10-CM

## 2019-03-07 PROCEDURE — 99999 PR PBB SHADOW E&M-EST. PATIENT-LVL III: ICD-10-PCS | Mod: PBBFAC,,, | Performed by: PHYSICIAN ASSISTANT

## 2019-03-07 PROCEDURE — 73110 XR WRIST COMPLETE 3 VIEWS RIGHT: ICD-10-PCS | Mod: 26,RT,, | Performed by: RADIOLOGY

## 2019-03-07 PROCEDURE — 73110 X-RAY EXAM OF WRIST: CPT | Mod: 26,RT,, | Performed by: RADIOLOGY

## 2019-03-07 PROCEDURE — 99999 PR PBB SHADOW E&M-EST. PATIENT-LVL III: CPT | Mod: PBBFAC,,, | Performed by: PHYSICIAN ASSISTANT

## 2019-03-07 PROCEDURE — 99024 POSTOP FOLLOW-UP VISIT: CPT | Mod: S$GLB,,, | Performed by: PHYSICIAN ASSISTANT

## 2019-03-07 PROCEDURE — 99024 PR POST-OP FOLLOW-UP VISIT: ICD-10-PCS | Mod: S$GLB,,, | Performed by: PHYSICIAN ASSISTANT

## 2019-03-07 PROCEDURE — 73110 X-RAY EXAM OF WRIST: CPT | Mod: TC,FY,RT

## 2019-03-07 NOTE — PROGRESS NOTES
"Ms. Cruz is here today for a post-operative visit.  She is 43 days status post Open reduction and internal fixation of right intraarticular 3+ part distal radius fracture by Dr. Carson on 1/23/19. She reports that she is doing well.  She denies any current pain. She is regularly attending OT, trying to do her HEP regularly.  She takes Tylenol as needed. She is taking Vitamin C 500 mg daily.  She denies fever, chills, and sweats since the time of the surgery.     Physical exam:    Vitals:    03/07/19 1000   BP: 134/78   Pulse: 60   Weight: 81.6 kg (180 lb)   Height: 5' 3" (1.6 m)   PainSc: 0-No pain     Vital signs are stable, patient is afebrile.  Patient is well dressed and well groomed, no acute distress.  Alert and oriented to person, place, and time.  Incision is healing well - clean, dry and intact. Mild edema of the fingers, hand, and wrist - improved from prior visit. There is no erythema or exudate.  There is no sign of any infection. She is NVI. Fair finger ROM.    Assessment: 43 days status post Open reduction and internal fixation of right intraarticular 3+ part distal radius fracture        Plan:  Kelley was seen today for post-op evaluation.    Diagnoses and all orders for this visit:    Closed fracture of distal end of right radius, unspecified fracture morphology, initial encounter  -     X-Ray Wrist Complete Right; Future    Orthopedic aftercare          - Forearm brace - gradual wean  - Reviewed XRay with patient  - Discussed scar massage  - Continue OT  - Follow up in 6 weeks  - Call with questions or concerns    "

## 2019-03-08 ENCOUNTER — CLINICAL SUPPORT (OUTPATIENT)
Dept: REHABILITATION | Facility: HOSPITAL | Age: 79
End: 2019-03-08
Payer: MEDICARE

## 2019-03-08 DIAGNOSIS — M25.531 WRIST PAIN, RIGHT: ICD-10-CM

## 2019-03-08 DIAGNOSIS — M79.89 SWELLING OF RIGHT HAND: ICD-10-CM

## 2019-03-08 DIAGNOSIS — M25.60 DECREASED RANGE OF MOTION: ICD-10-CM

## 2019-03-08 PROCEDURE — 97110 THERAPEUTIC EXERCISES: CPT | Mod: HCNC

## 2019-03-08 PROCEDURE — 97140 MANUAL THERAPY 1/> REGIONS: CPT | Mod: HCNC

## 2019-03-08 PROCEDURE — 97018 PARAFFIN BATH THERAPY: CPT | Mod: HCNC,59

## 2019-03-08 NOTE — PROGRESS NOTES
"  Occupational Therapy Daily Treatment Note      Date: 3/8/2019  Name: Kelley Cruz  Clinic Number: [id number]    Therapy Diagnosis: R Distal radius fracture with ORIF 1/23/2019   Encounter Diagnoses   Name Primary?    Swelling of right hand     Wrist pain, right     Decreased range of motion      Medical Diagnosis: S52.501A (ICD-10-CM) - Closed fracture of distal end of right radius, unspecified fracture morphology, initial encounter  Date of Onset: 1/14/19  Date of Surgery: 1/23/19  Surgical Procedure: s/p ORIF of R DR fx    Precautions: Standard and Weightbearing; NWB      Physician: Jennifer Lewis PA; Dr. Lyndsey Carson  Physician Orders: eval and treat  Date of Return to MD: 3/7/19     Evaluation Date: 2/15/2019  Plan of Care Certification Period: 4/12/19     Visit #: 6/ Visits authorized: 20   Insurance Authorization period Expiration: 12/31/19    Time In: 8:20 am  Time Out: 9:15 am  Total Billable Time: 30 minutes      Subjective     Pt reports: "my fingers are stiff again. But I've been doing my exercises."   Pt reports she has been trying to use R hand a little more at home for light tasks. Encouraged pt to start using her R hand for light activities, such as grooming, self feeding, and writing.  Encouraged gradual weaning from brace at home for light activity and ROM but continue orthosis use when out of house or for lifting, carrying.   she was compliant with home exercise program given last session. Reviewed performing HEP ~4x/day  Response to previous treatment:good, no adverse effects  Functional change: cont to be significantly limited functionally with R hand    Pain: 0/10  Location: right hand/wrist, reports stiffness this am secondary to cold weather     Objective     Pt is 6 weeks 2 days post op this day     Pt seen by OT this session. Treatment consisted of the following:    Kelley received the following supervised modalities after being cleared for contradictions for 10 minutes: "   -Patient received paraffin bath to R hand(s)  to increase blood flow, circulation, pain management and for tissue elasticity prior to therex.         Kelley received the following manual therapy techniques for 15 minutes:   MT: provided STM, including CFM, lymphatic drainage technique & scar management to R hand and wrist. Joint mobilizations to include: finger IP jts, grade 1-2, all planes functional glide. Pt received PROM to digits, isolated and composite x 10 reps each.  Gentle PROM to wrist and forearm as tolerated x 10 reps each.         Kelley received therapeutic exercises for 30 minutes including: (15 min 1:1, 1 no charge)  AROM Wrist  Ext/flx  RD/UD   X 10 reps each    Wave, hook, straight fist, composite fist, finger spreads, finger lifts,  opposition   X 10 reps each    AROM   Sup/pro X 10 reps each   Place and hold for composite fist X 10 reps with 5 sec hold   Wrist flex/ext with unweighted dowel X 10 reps   Sup/pro with medium dowel (0#) X 10 reps   Supination wheel:  Sup/pro  Flex/ext X 2 min each    Prayer stretch3 reps x 15 sec holdsLight sponge squeezesX 20 reps        Home Exercises and Education Provided     Education provided: reviewed HEP and frequency it should be performed, reviewed scar management and edema management.   - Progress towards goals     Written Home Exercises Provided: Patient instructed to cont prior HEP.  Exercises were reviewed and Kelley was able to demonstrate them prior to the end of the session.  Kelley demonstrated good  understanding of the education provided.   .   See EMR under Patient Instructions for exercises provided prior visit.     Assessment       Kelley is progressing fairly well towards her goals and there are no updates to goals at this time. Pt prognosis continues as Excellent. Pt cont to present with mild edema and stiffness in fingers and wrist, but improving.  Able to nearly make full fist today actively. Full fist noted passively. Wrist and forearm AROM  steadily improving as well.  However, wrist ext and supination most limited. Cont to be somewhat guarded with R hand.   Pt with good participation and motivation.  Good compliance with HEP.  Pt will continue to benefit from skilled outpatient occupational therapy to address the deficits listed in the problem list on initial evaluation, provide pt/family education and to maximize pt's level of independence in the home and community environment.     Anticipated barriers to continued occupational therapy: none.    Pt's spiritual, cultural and educational needs considered and pt agreeable to plan of care and goals.    Goals     The following goals were discussed with the patient and patient is in agreement with them as to be addressed in the treatment plan.   Long Term Goals (LTGs); to be met by discharge.  LTG #1: Pt will report a pain level of 0 out of 10 with daily tasks and ADLs   LTG #2: Pt will demo improved FOTO score by at least 20 points.   LTG #3: Pt will return to prior level of function for ADLs and household management.   LTG#4: Pt will demonstrate little to no edema in R hand and wrist for better use during ADLs  LTG #5:  and pinch to be assessed and goals set accordingly     Short Term Goals (STGs); to be met within 4 weeks (3/15/19).  STG #1a: Pt will report 2 out of 10 pain level at the worst with writing or ADLs.  STG #2a: Pt will demonstrate improved R wrist AROM by at least 10-15* for better use during daily tasks  STG #3a: Pt will demonstrate independence with issued HEP.   STG #3b: Pt will demo improved R hand digits CORONA by at least 15-20 degrees needed to aid with grasping objects and holding things      Plan     Cont OT poC   2/15/219 to 4/12/2019     Discussed Plan of Care with patient: Yes  Updates/Grading for next session: progress as tolerated with ROM, and cont with scar and edema management; take measurements next session    DELVIN Del Toro

## 2019-03-09 RX ORDER — TRIAMTERENE AND HYDROCHLOROTHIAZIDE 37.5; 25 MG/1; MG/1
1 CAPSULE ORAL DAILY
Qty: 90 CAPSULE | Refills: 2 | Status: SHIPPED | OUTPATIENT
Start: 2019-03-09 | End: 2019-09-20 | Stop reason: SDUPTHER

## 2019-03-12 ENCOUNTER — CLINICAL SUPPORT (OUTPATIENT)
Dept: REHABILITATION | Facility: HOSPITAL | Age: 79
End: 2019-03-12
Attending: PHYSICIAN ASSISTANT
Payer: MEDICARE

## 2019-03-12 DIAGNOSIS — M25.60 DECREASED RANGE OF MOTION: ICD-10-CM

## 2019-03-12 DIAGNOSIS — M25.531 WRIST PAIN, RIGHT: ICD-10-CM

## 2019-03-12 DIAGNOSIS — M79.89 SWELLING OF RIGHT HAND: ICD-10-CM

## 2019-03-12 PROCEDURE — 97110 THERAPEUTIC EXERCISES: CPT | Mod: HCNC

## 2019-03-12 PROCEDURE — 97140 MANUAL THERAPY 1/> REGIONS: CPT | Mod: HCNC

## 2019-03-12 PROCEDURE — 97018 PARAFFIN BATH THERAPY: CPT | Mod: HCNC,59

## 2019-03-12 NOTE — PROGRESS NOTES
"  Occupational Therapy Daily Treatment Note      Date: 3/12/2019  Name: Kelley Cruz  Clinic Number: [id number]    Therapy Diagnosis: R Distal radius fracture with ORIF 1/23/2019   Encounter Diagnoses   Name Primary?    Swelling of right hand     Wrist pain, right     Decreased range of motion      Medical Diagnosis: S52.501A (ICD-10-CM) - Closed fracture of distal end of right radius, unspecified fracture morphology, initial encounter  Date of Onset: 1/14/19  Date of Surgery: 1/23/19  Surgical Procedure: s/p ORIF of R DR fx    Precautions: Standard and Weightbearing; NWB      Physician: Jennifer Lewis PA; Dr. Lyndsey Carson  Physician Orders: eval and treat  Date of Return to MD: 3/7/19     Evaluation Date: 2/15/2019  Plan of Care Certification Period: 4/12/19     Visit #: 7/ Visits authorized: 20   Insurance Authorization period Expiration: 12/31/19    Time In: 9:25 am  Time Out: 10:20 am  Total Billable Time: 50 minutes      Subjective     Pt reports: "my finger tips take turns hurting a little."  Pt reports has been trying to use her R hand a bit more. Has been writing with R hand, and tried using R hand to assist with tying shoes.  Pt reports she has been trying to use R hand a little more at home for light tasks. Encouraged pt to cont using her R hand for light activities, such as grooming, self feeding, and writing.  Encouraged gradual weaning from brace at home for light activity and ROM but continue orthosis use when out of house or for lifting, carrying.   she was compliant with home exercise program given last session. Reviewed performing HEP ~4x/day  Response to previous treatment:good, no adverse effects  Functional change: cont to be significantly limited functionally with R hand, but able to write and assist with tying shoes    Pain: 0/10  Location: right hand/wrist, reports stiffness in fingers still    Objective     Measurements taken today:  Edema. Measured in centimeters.  Mild " edema noted in fingers as well    2/15/2019 2/15/2019 3/12/19     Left Right Right   Wrist Crease 16.5 19 18.5 (-0.5)   DPC 19.3 20.7 20.4 (-0.3)   MCPs 19.1 19.7 19.9 (+0.2)            Elbow and Wrist ROM. Measured in degrees.    2/15/2019 2/15/2019 3/12/19     Left Right Right   Elbow Ext/Flex WFL 22/WNL WFL   Supination/Pronation WFL 10/75 36/76 (+26/+1)   Wrist Ext/Flex 60/70 0/36 20/52 (+20/+16)   Wrist RD/UD 12/42 8/8 18/20 (+10/+12)     Hand ROM. Measured in degrees.    2/15/2019 3/12/19     Right Right          Index: MP  15/63 70              PIP     55 77              DIP 17 25              CORONA 120 172 (+52)          Long:  MP 12/60 67              PIP 60 87              DIP 17 45              CORONA 125 199 (+74)          Ring:   MP 10/55 67              PIP 57 90              DIP 15 35              CORONA 117 192 (+75)          Small:  MP 10/50 60               PIP 52 80               DIP 20 45              CORONA 112 185 (+73)          Thumb: MP 32 45 (+13)                IP 15 35 (+20)       Rad ADD/ABD 24/34 22/36  (+4)       Pal ADD/ABD 30/40 22/38 (+6)          Opposition To LF DIP, ~5 cm from DPC To tip of SF; ~1.5 from DPC         Pt is 6 weeks 6 days post op this day     Pt seen by OT this session. Treatment consisted of the following:    Kelley received the following supervised modalities after being cleared for contradictions for 10 minutes:   -Patient received paraffin bath to R hand(s)  to increase blood flow, circulation, pain management and for tissue elasticity prior to therex.         Kelley received the following manual therapy techniques for 15 minutes:   MT: provided STM, including CFM, lymphatic drainage technique & scar management to R hand and wrist. Joint mobilizations to include: finger IP jts, grade 1-2, all planes functional glide. Pt received PROM to digits, isolated and composite x 10 reps each.  Gentle PROM to wrist and forearm as tolerated x 10 reps each.         Kelley received  therapeutic exercises for 30 minutes including:   AROM Wrist  Ext/flx  RD/UD   X 10 reps each    Wave, hook, straight fist, composite fist, finger spreads, finger lifts,  opposition   X 10 reps each    AROM   Sup/pro X 10 reps each   Place and hold for composite fist X 10 reps with 5 sec hold   Wrist flex/ext with unweighted dowel X 10 reps   Sup/pro with medium dowel (0#) X 10 reps   Supination wheel:  Sup/pro  Flex/ext X 2 min each    Prayer stretch3 reps x 15 sec holdsLight sponge squeezesX 20 repsManual dexterity task with pom poms 1 container           Home Exercises and Education Provided     Education provided: reviewed HEP and frequency it should be performed, reviewed scar management and edema management.   - Progress towards goals     Written Home Exercises Provided: Patient instructed to cont prior HEP.  Exercises were reviewed and Kelley was able to demonstrate them prior to the end of the session.  Kelley demonstrated good  understanding of the education provided.   .   See EMR under Patient Instructions for exercises provided prior visit.     Assessment       Kelley is progressing fairly well towards her goals and there are no updates to goals at this time. Pt prognosis continues as Excellent. Pt cont to present with mild edema and stiffness in fingers and wrist, but improving.  Able to nearly make full fist today actively. Full fist noted passively. Wrist and forearm AROM steadily improving as well.  However, wrist ext and supination cont to be most limited. Cont to be somewhat guarded with R hand. Weakness and stiffness cont to limit pt functionally, along with precautions.   Pt with good participation and motivation.  Good compliance with HEP.  Pt will continue to benefit from skilled outpatient occupational therapy to address the deficits listed in the problem list on initial evaluation, provide pt/family education and to maximize pt's level of independence in the home and community environment.      Anticipated barriers to continued occupational therapy: none.    Pt's spiritual, cultural and educational needs considered and pt agreeable to plan of care and goals.    Goals     The following goals were discussed with the patient and patient is in agreement with them as to be addressed in the treatment plan.   Long Term Goals (LTGs); to be met by discharge.  LTG #1: Pt will report a pain level of 0 out of 10 with daily tasks and ADLs   LTG #2: Pt will demo improved FOTO score by at least 20 points.   LTG #3: Pt will return to prior level of function for ADLs and household management.   LTG#4: Pt will demonstrate little to no edema in R hand and wrist for better use during ADLs  LTG #5:  and pinch to be assessed and goals set accordingly     Short Term Goals (STGs); to be met within 4 weeks (3/15/19).  STG #1a: Pt will report 2 out of 10 pain level at the worst with writing or ADLs.---met  STG #2a: Pt will demonstrate improved R wrist AROM by at least 10-15* for better use during daily tasks ---met, cont  STG #3a: Pt will demonstrate independence with issued HEP. ---met  STG #3b: Pt will demo improved R hand digits CORONA by at least 15-20 degrees needed to aid with grasping objects and holding things ---met, cont      Plan     Cont OT poC   2/15/219 to 4/12/2019     Discussed Plan of Care with patient: Yes  Updates/Grading for next session: progress as tolerated with ROM, and cont with scar and edema management    DELVIN Del Toro

## 2019-03-14 ENCOUNTER — CLINICAL SUPPORT (OUTPATIENT)
Dept: REHABILITATION | Facility: HOSPITAL | Age: 79
End: 2019-03-14
Payer: MEDICARE

## 2019-03-14 DIAGNOSIS — M25.60 DECREASED RANGE OF MOTION: ICD-10-CM

## 2019-03-14 DIAGNOSIS — M79.89 SWELLING OF RIGHT HAND: ICD-10-CM

## 2019-03-14 DIAGNOSIS — S52.501A CLOSED FRACTURE OF DISTAL END OF RIGHT RADIUS, UNSPECIFIED FRACTURE MORPHOLOGY, INITIAL ENCOUNTER: Primary | ICD-10-CM

## 2019-03-14 DIAGNOSIS — M25.531 WRIST PAIN, RIGHT: ICD-10-CM

## 2019-03-14 PROCEDURE — 97110 THERAPEUTIC EXERCISES: CPT | Mod: HCNC

## 2019-03-14 PROCEDURE — 97140 MANUAL THERAPY 1/> REGIONS: CPT | Mod: HCNC

## 2019-03-14 NOTE — PROGRESS NOTES
"  Occupational Therapy Daily Treatment Note      Date: 3/14/2019  Name: Kelley Cruz  Clinic Number: [id number]    Therapy Diagnosis: R Distal radius fracture with ORIF 1/23/2019   Encounter Diagnoses   Name Primary?    Swelling of right hand     Wrist pain, right     Decreased range of motion     Closed fracture of distal end of right radius, unspecified fracture morphology, initial encounter Yes     Medical Diagnosis: S52.501A (ICD-10-CM) - Closed fracture of distal end of right radius, unspecified fracture morphology, initial encounter  Date of Onset: 1/14/19  Date of Surgery: 1/23/19  Surgical Procedure: s/p ORIF of R DR fx    Precautions: Standard and Weightbearing; NWB      Physician: Jennifer Lewis PA; Dr. Lyndsey Carson  Physician Orders: eval and treat  Date of Return to MD: 3/7/19     Evaluation Date: 2/15/2019  Plan of Care Certification Period: 4/12/19     Visit #: 7/ Visits authorized: 20   Insurance Authorization period Expiration: 12/31/19    Time In: 9:25 am  Time Out: 10:20 am  Total Billable Time: 50 minutes      Subjective     Pt reports: "my finger tips take turns hurting a little."  Pt reports has been trying to use her R hand a bit more. Has been writing with R hand, and tried using R hand to assist with tying shoes.  Pt reports she has been trying to use R hand a little more at home for light tasks. Encouraged pt to cont using her R hand for light activities, such as grooming, self feeding, and writing.  Encouraged gradual weaning from brace at home for light activity and ROM but continue orthosis use when out of house or for lifting, carrying.   she was compliant with home exercise program given last session. Reviewed performing HEP ~4x/day  Response to previous treatment:good, no adverse effects  Functional change: cont to be significantly limited functionally with R hand, but able to write and assist with tying shoes    Pain: 0/10  Location: right hand/wrist, reports " stiffness in fingers still    Objective     Measurements taken today:  Edema. Measured in centimeters.  Mild edema noted in fingers as well    2/15/2019 2/15/2019 3/12/19     Left Right Right   Wrist Crease 16.5 19 18.5 (-0.5)   DPC 19.3 20.7 20.4 (-0.3)   MCPs 19.1 19.7 19.9 (+0.2)            Elbow and Wrist ROM. Measured in degrees.    2/15/2019 2/15/2019 3/12/19     Left Right Right   Elbow Ext/Flex WFL 22/WNL WFL   Supination/Pronation WFL 10/75 36/76 (+26/+1)   Wrist Ext/Flex 60/70 0/36 20/52 (+20/+16)   Wrist RD/UD 12/42 8/8 18/20 (+10/+12)     Hand ROM. Measured in degrees.    2/15/2019 3/12/19     Right Right          Index: MP  15/63 70              PIP     55 77              DIP 17 25              CORONA 120 172 (+52)          Long:  MP 12/60 67              PIP 60 87              DIP 17 45              CORONA 125 199 (+74)          Ring:   MP 10/55 67              PIP 57 90              DIP 15 35              CORONA 117 192 (+75)          Small:  MP 10/50 60               PIP 52 80               DIP 20 45              CORONA 112 185 (+73)          Thumb: MP 32 45 (+13)                IP 15 35 (+20)       Rad ADD/ABD 24/34 22/36  (+4)       Pal ADD/ABD 30/40 22/38 (+6)          Opposition To LF DIP, ~5 cm from DPC To tip of SF; ~1.5 from DPC         Pt is 6 weeks 6 days post op this day     Pt seen by OT this session. Treatment consisted of the following:    Kelley received the following supervised modalities after being cleared for contradictions for 10 minutes:   -Patient received paraffin bath to R hand(s)  to increase blood flow, circulation, pain management and for tissue elasticity prior to therex.         Kelley received the following manual therapy techniques for 15 minutes:   MT: provided STM, including CFM, lymphatic drainage technique & scar management to R hand and wrist. Joint mobilizations to include: finger IP jts, grade 1-2, all planes functional glide. Pt received PROM to digits, isolated and composite  x 10 reps each.  Gentle PROM to wrist and forearm as tolerated x 10 reps each.         Kelley received therapeutic exercises for 30 minutes including:   AROM Wrist  Ext/flx  RD/UD 1# wrist flex, ext, RD, UD   X 10 reps each    Wave, hook, straight fist, composite fist, finger spreads, finger lifts,  opposition   X 10 reps each    AROM   Sup/pro  1/2 lbs hammer X 10 reps each   Place and hold for composite fist X 10 reps with 5 sec hold   Supination wheel:   X 2 min each    Prayer stretch3 reps x 15 sec holdsYellow putty for gross  squeezes, key, 2 and 3 pt pinch X 10 reps each 4# clothespin task with pom poms 1 container           Home Exercises and Education Provided     Education provided: reviewed HEP and frequency it should be performed, reviewed scar management and edema management.   - Progress towards goals     Written Home Exercises Provided: Patient instructed to cont prior HEP.  Exercises were reviewed and Kelley was able to demonstrate them prior to the end of the session.  Kelley demonstrated good  understanding of the education provided.   .   See EMR under Patient Instructions for exercises provided prior visit.     Assessment       Kelley is progressing fairly well towards her goals and there are no updates to goals at this time. Pt prognosis continues as Excellent. Pt cont to present with mild edema and stiffness in fingers and wrist, but improving.  Able to nearly make full fist today actively. Full fist noted passively. Wrist and forearm AROM steadily improving as well.  However, wrist ext and supination cont to be most limited. Cont to be somewhat guarded with R hand. Weakness and stiffness cont to limit pt functionally, along with precautions.   Pt with good participation and motivation.  Good compliance with HEP.  Pt will continue to benefit from skilled outpatient occupational therapy to address the deficits listed in the problem list on initial evaluation, provide pt/family education and to  maximize pt's level of independence in the home and community environment.     Anticipated barriers to continued occupational therapy: none.    Pt's spiritual, cultural and educational needs considered and pt agreeable to plan of care and goals.    Goals     The following goals were discussed with the patient and patient is in agreement with them as to be addressed in the treatment plan.   Long Term Goals (LTGs); to be met by discharge.  LTG #1: Pt will report a pain level of 0 out of 10 with daily tasks and ADLs   LTG #2: Pt will demo improved FOTO score by at least 20 points.   LTG #3: Pt will return to prior level of function for ADLs and household management.   LTG#4: Pt will demonstrate little to no edema in R hand and wrist for better use during ADLs  LTG #5:  and pinch to be assessed and goals set accordingly     Short Term Goals (STGs); to be met within 4 weeks (3/15/19).  STG #1a: Pt will report 2 out of 10 pain level at the worst with writing or ADLs.---met  STG #2a: Pt will demonstrate improved R wrist AROM by at least 10-15* for better use during daily tasks ---met, cont  STG #3a: Pt will demonstrate independence with issued HEP. ---met  STG #3b: Pt will demo improved R hand digits CORONA by at least 15-20 degrees needed to aid with grasping objects and holding things ---met, cont      Plan     Cont OT POC   2/15/219 to 4/12/2019     Discussed Plan of Care with patient: Yes  Updates/Grading for next session: progress as tolerated with ROM, and cont with scar and edema management    DELVIN Pratt, CHT

## 2019-03-18 ENCOUNTER — CLINICAL SUPPORT (OUTPATIENT)
Dept: REHABILITATION | Facility: HOSPITAL | Age: 79
End: 2019-03-18
Payer: MEDICARE

## 2019-03-18 DIAGNOSIS — M25.531 WRIST PAIN, RIGHT: ICD-10-CM

## 2019-03-18 DIAGNOSIS — M25.60 DECREASED RANGE OF MOTION: ICD-10-CM

## 2019-03-18 DIAGNOSIS — M79.89 SWELLING OF RIGHT HAND: ICD-10-CM

## 2019-03-18 PROCEDURE — 97110 THERAPEUTIC EXERCISES: CPT | Mod: HCNC

## 2019-03-18 PROCEDURE — 97140 MANUAL THERAPY 1/> REGIONS: CPT | Mod: HCNC

## 2019-03-18 NOTE — PROGRESS NOTES
"  Occupational Therapy Daily Treatment Note      Date: 3/18/2019  Name: Kelley Cruz  Clinic Number: [id number]    Therapy Diagnosis: R Distal radius fracture with ORIF 1/23/2019   Encounter Diagnoses   Name Primary?    Swelling of right hand     Wrist pain, right     Decreased range of motion      Medical Diagnosis: S52.501A (ICD-10-CM) - Closed fracture of distal end of right radius, unspecified fracture morphology, initial encounter  Date of Onset: 1/14/19  Date of Surgery: 1/23/19  Surgical Procedure: s/p ORIF of R DR fx    Precautions: Standard and Weightbearing; NWB      Physician: Jennifer Lewis PA; Dr. Lyndsey Carson  Physician Orders: eval and treat  Date of Return to MD: 3/7/19     Evaluation Date: 2/15/2019  Plan of Care Certification Period: 4/12/19     Visit #: 8/ Visits authorized: 20   Insurance Authorization period Expiration: 12/31/19    Time In: 9:25 am  Time Out: 10:20 am  Total Billable Time: 50 minutes      Subjective     Pt reports: "it's still stiff, and a little swollen around my fingers and wrist"  Pt reports has been trying to use her R hand a bit more. Has been writing with R hand, and tried using R hand to assist with tying shoes.  Pt reports she has been trying to use R hand a little more at home for light tasks. Encouraged pt to cont using her R hand for light activities, such as grooming, self feeding, and writing.  Encouraged gradual weaning from brace at home for light activity and ROM but continue orthosis use when out of house or for lifting, carrying.   she was compliant with home exercise program given last session. Reviewed performing HEP ~4x/day  Response to previous treatment:good, no adverse effects  Functional change: cont to be significantly limited functionally with R hand, but able to write and assist with tying shoes    Pain: 0/10  Location: right hand/wrist, reports stiffness in fingers still    Objective     Measurements taken today:  Edema. Measured " in centimeters.  Mild edema noted in fingers as well    2/15/2019 2/15/2019 3/12/19     Left Right Right   Wrist Crease 16.5 19 18.5 (-0.5)   DPC 19.3 20.7 20.4 (-0.3)   MCPs 19.1 19.7 19.9 (+0.2)            Elbow and Wrist ROM. Measured in degrees.    2/15/2019 2/15/2019 3/12/19     Left Right Right   Elbow Ext/Flex WFL 22/WNL WFL   Supination/Pronation WFL 10/75 36/76 (+26/+1)   Wrist Ext/Flex 60/70 0/36 20/52 (+20/+16)   Wrist RD/UD 12/42 8/8 18/20 (+10/+12)     Hand ROM. Measured in degrees.    2/15/2019 3/12/19     Right Right          Index: MP  15/63 70              PIP     55 77              DIP 17 25              CORONA 120 172 (+52)          Long:  MP 12/60 67              PIP 60 87              DIP 17 45              CORONA 125 199 (+74)          Ring:   MP 10/55 67              PIP 57 90              DIP 15 35              CORONA 117 192 (+75)          Small:  MP 10/50 60               PIP 52 80               DIP 20 45              CORONA 112 185 (+73)          Thumb: MP 32 45 (+13)                IP 15 35 (+20)       Rad ADD/ABD 24/34 22/36  (+4)       Pal ADD/ABD 30/40 22/38 (+6)          Opposition To LF DIP, ~5 cm from DPC To tip of SF; ~1.5 from DPC         Pt is 6 weeks 6 days post op this day     Pt seen by OT this session. Treatment consisted of the following:    Kelley received the following supervised modalities after being cleared for contradictions for 10 minutes:   -Patient received paraffin bath to R hand(s)  to increase blood flow, circulation, pain management and for tissue elasticity prior to therex.         Kelley received the following manual therapy techniques for 15 minutes:   MT: provided STM, including CFM, lymphatic drainage technique & scar management to R hand and wrist. Joint mobilizations to include: finger IP jts, grade 1-2, all planes functional glide. Pt received PROM to digits, isolated and composite x 10 reps each.  Gentle PROM to wrist and forearm as tolerated x 10 reps each.          Kelley received therapeutic exercises for 30 minutes including:   AROM Wrist  Ext/flx  RD/UD 1# flex, ext, RD, UD    X 10 reps each    Wave, hook, straight fist, composite fist, finger spreads, finger lifts,  opposition   X 10 reps each    AROM   Sup/pro 0.5# hammer X 10 reps each   Place and hold for composite fist X 10 reps with 5 sec hold   Supination wheel:  Sup/pro  Flex/ext X 2 min each    Prayer stretch3 reps x 15 sec holdsLight yellow putty   , key, 2 and 3 pt pinch X 10 repsPinch strengthening  task with pom poms 2# with pom poms            Home Exercises and Education Provided     Education provided: reviewed HEP and frequency it should be performed, reviewed scar management and edema management.   - Progress towards goals     Written Home Exercises Provided: Patient instructed to cont prior HEP.  Exercises were reviewed and Kelley was able to demonstrate them prior to the end of the session.  Kelley demonstrated good  understanding of the education provided.   .   See EMR under Patient Instructions for exercises provided prior visit.     Assessment       Kelley is progressing fairly well towards her goals and there are no updates to goals at this time. Pt prognosis continues as Excellent. Pt cont to present with mild edema and stiffness in fingers and wrist, but improving.  Able to nearly make full fist today actively. Full fist noted passively. Wrist and forearm AROM steadily improving as well.  However, wrist ext and supination cont to be most limited. Cont to be somewhat guarded with R hand. Weakness and stiffness cont to limit pt functionally, along with precautions.   Pt with good participation and motivation.  Good compliance with HEP.  Pt will continue to benefit from skilled outpatient occupational therapy to address the deficits listed in the problem list on initial evaluation, provide pt/family education and to maximize pt's level of independence in the home and community environment.      Anticipated barriers to continued occupational therapy: none.    Pt's spiritual, cultural and educational needs considered and pt agreeable to plan of care and goals.    Goals     The following goals were discussed with the patient and patient is in agreement with them as to be addressed in the treatment plan.   Long Term Goals (LTGs); to be met by discharge.  LTG #1: Pt will report a pain level of 0 out of 10 with daily tasks and ADLs   LTG #2: Pt will demo improved FOTO score by at least 20 points.   LTG #3: Pt will return to prior level of function for ADLs and household management.   LTG#4: Pt will demonstrate little to no edema in R hand and wrist for better use during ADLs  LTG #5:  and pinch to be assessed and goals set accordingly     Short Term Goals (STGs); to be met within 4 weeks (3/15/19).  STG #1a: Pt will report 2 out of 10 pain level at the worst with writing or ADLs.---met  STG #2a: Pt will demonstrate improved R wrist AROM by at least 10-15* for better use during daily tasks ---met, cont  STG #3a: Pt will demonstrate independence with issued HEP. ---met  STG #3b: Pt will demo improved R hand digits CORONA by at least 15-20 degrees needed to aid with grasping objects and holding things ---met, cont      Plan     Cont OT POC   2/15/219 to 4/12/2019     Discussed Plan of Care with patient: Yes  Updates/Grading for next session: progress as tolerated with ROM, and cont with scar and edema management    DELVIN Pratt, CHT

## 2019-03-26 ENCOUNTER — CLINICAL SUPPORT (OUTPATIENT)
Dept: REHABILITATION | Facility: HOSPITAL | Age: 79
End: 2019-03-26
Attending: PHYSICIAN ASSISTANT
Payer: MEDICARE

## 2019-03-26 DIAGNOSIS — M25.531 WRIST PAIN, RIGHT: ICD-10-CM

## 2019-03-26 DIAGNOSIS — M25.60 DECREASED RANGE OF MOTION: ICD-10-CM

## 2019-03-26 DIAGNOSIS — M79.89 SWELLING OF RIGHT HAND: ICD-10-CM

## 2019-03-26 PROCEDURE — 97140 MANUAL THERAPY 1/> REGIONS: CPT | Mod: HCNC

## 2019-03-26 PROCEDURE — 97110 THERAPEUTIC EXERCISES: CPT | Mod: HCNC

## 2019-03-26 NOTE — PROGRESS NOTES
"  Occupational Therapy Daily Treatment Note      Date: 3/26/2019  Name: Kelley Cruz  Clinic Number: [id number]    Therapy Diagnosis: R Distal radius fracture with ORIF 1/23/2019   Encounter Diagnoses   Name Primary?    Swelling of right hand     Wrist pain, right     Decreased range of motion      Medical Diagnosis: S52.501A (ICD-10-CM) - Closed fracture of distal end of right radius, unspecified fracture morphology, initial encounter  Date of Onset: 1/14/19  Date of Surgery: 1/23/19  Surgical Procedure: s/p ORIF of R DR fx    Precautions: Standard and Weightbearing; NWB      Physician: Jennifer Lewis PA; Dr. Lyndsey Carson  Physician Orders: eval and treat  Date of Return to MD: 4/18/2019     Evaluation Date: 2/15/2019  Plan of Care Certification Period: 4/12/19     Visit #: 8/ Visits authorized: 20   Insurance Authorization period Expiration: 12/31/19  MEDICARE FOTO (VISIT 1, 6)     Time In: 315 pm   Time Out: 405 pm   Total Billable Time: 50 minutes      Subjective     Pt reports: "My hand was looking good this morning, but I did some household cleaning today and now my fingers look swollen. I'm not wearing the brace at home, I just sleep with it, I wear it when I leave the house."       Pt reports has been trying to use her R hand a bit more. Has been writing with R hand, and tying shoes with  R hand.  Pt reports she has been trying to use R hand a little more at home for light tasks. Encouraged pt to cont using her R hand for light activities, such as grooming, self feeding, and writing.  Encouraged gradual weaning from brace at home for light activity and ROM but continue orthosis use when out of house or for lifting, carrying.   she was compliant with home exercise program given last session. Reviewed performing HEP ~4x/day  Response to previous treatment:good, no adverse effects  Functional change: cont to be significantly limited functionally with R hand, but able to write and assist with " tying shoes    Pain: 0/10  Location: right hand/wrist, reports stiffness in fingers still    Objective     RE-assessment on 3/12/19 as follows:  Edema. Measured in centimeters.  Mild edema noted in fingers as well    2/15/2019 2/15/2019 3/12/19     Left Right Right   Wrist Crease 16.5 19 18.5 (-0.5)   DPC 19.3 20.7 20.4 (-0.3)   MCPs 19.1 19.7 19.9 (+0.2)            Elbow and Wrist ROM. Measured in degrees.    2/15/2019 2/15/2019 3/12/19     Left Right Right   Elbow Ext/Flex WFL 22/WNL WFL   Supination/Pronation WFL 10/75 36/76 (+26/+1)   Wrist Ext/Flex 60/70 0/36 20/52 (+20/+16)   Wrist RD/UD 12/42 8/8 18/20 (+10/+12)     Hand ROM. Measured in degrees.    2/15/2019 3/12/19     Right Right          Index: MP  15/63 70              PIP     55 77              DIP 17 25              CORONA 120 172 (+52)          Long:  MP 12/60 67              PIP 60 87              DIP 17 45              CORONA 125 199 (+74)          Ring:   MP 10/55 67              PIP 57 90              DIP 15 35              CORONA 117 192 (+75)          Small:  MP 10/50 60               PIP 52 80               DIP 20 45              CORONA 112 185 (+73)          Thumb: MP 32 45 (+13)                IP 15 35 (+20)       Rad ADD/ABD 24/34 22/36  (+4)       Pal ADD/ABD 30/40 22/38 (+6)          Opposition To LF DIP, ~5 cm from DPC To tip of SF; ~1.5 from DPC         Pt is 8+ weeks post op.    Pt seen by OT this session. Treatment consisted of the following:    Kelley received the following supervised modalities after being cleared for contradictions for 10 minutes:   -Patient received paraffin bath to R hand(s)  to increase blood flow, circulation, pain management and for tissue elasticity prior to therex.         Kelley received the following manual therapy techniques for 15 minutes:   MT: provided STM, including CFM, lymphatic drainage technique & scar management to R hand and wrist. Joint mobilizations to include: finger IP jts, grade 1-2, all planes functional  glide. Pt received PROM to digits, isolated and composite x 10 reps each.  Gentle PROM to wrist and forearm as tolerated x 10 reps each.         Kelley received therapeutic exercises for 30 minutes including:   AROM Wrist  Ext/flx  RD/UD 1# flex, ext, RD, UD    X 10 reps each    Wave, hook, straight fist, composite fist, finger spreads, finger lifts,  opposition   X 10 reps each    AROM   Sup/pro 0.5# hammer X 10 reps each   Place and hold for composite fist X 10 reps with 5 sec hold   1# hammer stretch   Sup/pro  Wrist extension  X 10 reps    Prayer stretch3 reps x 15 sec holdsLight yellow putty   , key, 2 and 3 pt pinch X 10 repsPinch strengthening  task with pom poms 2# with pom poms        Home Exercises and Education Provided     Education provided: reviewed HEP and frequency it should be performed, reviewed scar management and edema management.   - Progress towards goals     Written Home Exercises Provided: Patient instructed to cont prior HEP.  Exercises were reviewed and Kelley was able to demonstrate them prior to the end of the session.  Kelley demonstrated good  understanding of the education provided.   .   See EMR under Patient Instructions for exercises provided prior visit.     Assessment       Kelley is progressing fairly well towards her goals and there are no updates to goals at this time. Pt prognosis continues as Excellent. Pt cont to present with mild edema and stiffness in fingers and wrist, but improving.  Able to  make full fist today actively.  Wrist and forearm AROM steadily improving as well.  However, wrist ext and supination cont to be most limited. Cont to be somewhat guarded with R hand. Weakness and stiffness cont to limit pt functionally, along with precautions.   Pt with good participation and motivation.  Good compliance with HEP.  Pt will continue to benefit from skilled outpatient occupational therapy to address the deficits listed in the problem list on initial evaluation,  provide pt/family education and to maximize pt's level of independence in the home and community environment.     Anticipated barriers to continued occupational therapy: none.    Pt's spiritual, cultural and educational needs considered and pt agreeable to plan of care and goals.    Goals     The following goals were discussed with the patient and patient is in agreement with them as to be addressed in the treatment plan.   Long Term Goals (LTGs); to be met by discharge.  LTG #1: Pt will report a pain level of 0 out of 10 with daily tasks and ADLs   LTG #2: Pt will demo improved FOTO score by at least 20 points.   LTG #3: Pt will return to prior level of function for ADLs and household management.   LTG#4: Pt will demonstrate little to no edema in R hand and wrist for better use during ADLs  LTG #5:  and pinch to be assessed and goals set accordingly     Short Term Goals (STGs); to be met within 4 weeks (3/15/19).  STG #1a: Pt will report 2 out of 10 pain level at the worst with writing or ADLs.---met  STG #2a: Pt will demonstrate improved R wrist AROM by at least 10-15* for better use during daily tasks ---met, cont  STG #3a: Pt will demonstrate independence with issued HEP. ---met  STG #3b: Pt will demo improved R hand digits CORONA by at least 15-20 degrees needed to aid with grasping objects and holding things ---met, cont      Plan     Cont OT POC   2/15/219 to 4/12/2019     Discussed Plan of Care with patient: Yes  Updates/Grading for next session: progress as tolerated with ROM, and cont with scar and edema management    DELVIN Pratt, CHT

## 2019-04-02 ENCOUNTER — CLINICAL SUPPORT (OUTPATIENT)
Dept: REHABILITATION | Facility: HOSPITAL | Age: 79
End: 2019-04-02
Payer: MEDICARE

## 2019-04-02 DIAGNOSIS — M25.531 WRIST PAIN, RIGHT: ICD-10-CM

## 2019-04-02 DIAGNOSIS — M79.89 SWELLING OF RIGHT HAND: ICD-10-CM

## 2019-04-02 DIAGNOSIS — M25.60 DECREASED RANGE OF MOTION: ICD-10-CM

## 2019-04-02 PROCEDURE — 97110 THERAPEUTIC EXERCISES: CPT | Mod: HCNC

## 2019-04-02 NOTE — PROGRESS NOTES
"  Occupational Therapy Daily Treatment Note      Date: 4/2/2019  Name: Kelley Cruz  Clinic Number: [id number]    Therapy Diagnosis: R Distal radius fracture with ORIF 1/23/2019   Encounter Diagnoses   Name Primary?    Swelling of right hand     Wrist pain, right     Decreased range of motion      Medical Diagnosis: S52.501A (ICD-10-CM) - Closed fracture of distal end of right radius, unspecified fracture morphology, initial encounter  Date of Onset: 1/14/19  Date of Surgery: 1/23/19  Surgical Procedure: s/p ORIF of R DR fx    Precautions: Standard and Weightbearing; NWB      Physician: Jennifer Lewis PA; Dr. Lyndsey Carson  Physician Orders: eval and treat  Date of Return to MD: 4/18/2019     Evaluation Date: 2/15/2019  Plan of Care Certification Period: 4/12/19     Visit #: 11/ Visits authorized: 20   Insurance Authorization period Expiration: 12/31/19  MEDICARE FOTO (VISIT 1, 6)     Time In: 715 pm   Time Out: 805 pm   Total Billable Time: 50 minutes      Subjective     Pt reports: "I'm trying to use my hand more, it just gets stiff when its cold "       Pt reports has been trying to use her R hand a bit more. Has been writing with R hand, and tying shoes with  R hand.  Pt reports she has been trying to use R hand a little more at home for light tasks. Encouraged pt to cont using her R hand for light activities, such as grooming, self feeding, and writing.  Encouraged gradual weaning from brace at home for light activity and ROM but continue orthosis use when out of house or for lifting, carrying.   she was compliant with home exercise program given last session. Reviewed performing HEP ~4x/day  Response to previous treatment:good, no adverse effects  Functional change: cont to be significantly limited functionally with R hand, but able to write and assist with tying shoes    Pain: 0/10  Location: right hand/wrist, reports stiffness in fingers still    Objective     RE-assessment on 3/12/19 as " follows:  Edema. Measured in centimeters.  Mild edema noted in fingers as well    2/15/2019 2/15/2019 3/12/19     Left Right Right   Wrist Crease 16.5 19 18.5 (-0.5)   DPC 19.3 20.7 20.4 (-0.3)   MCPs 19.1 19.7 19.9 (+0.2)            Elbow and Wrist ROM. Measured in degrees.    2/15/2019 2/15/2019 3/12/19     Left Right Right   Elbow Ext/Flex WFL 22/WNL WFL   Supination/Pronation WFL 10/75 36/76 (+26/+1)   Wrist Ext/Flex 60/70 0/36 20/52 (+20/+16)   Wrist RD/UD 12/42 8/8 18/20 (+10/+12)     Hand ROM. Measured in degrees.    2/15/2019 3/12/19     Right Right          Index: MP  15/63 70              PIP     55 77              DIP 17 25              CORONA 120 172 (+52)          Long:  MP 12/60 67              PIP 60 87              DIP 17 45              CORONA 125 199 (+74)          Ring:   MP 10/55 67              PIP 57 90              DIP 15 35              CORONA 117 192 (+75)          Small:  MP 10/50 60               PIP 52 80               DIP 20 45              CORONA 112 185 (+73)          Thumb: MP 32 45 (+13)                IP 15 35 (+20)       Rad ADD/ABD 24/34 22/36  (+4)       Pal ADD/ABD 30/40 22/38 (+6)          Opposition To LF DIP, ~5 cm from DPC To tip of SF; ~1.5 from DPC         Pt is 8+ weeks post op.    Pt seen by OT this session. Treatment consisted of the following:    Kelley received the following supervised modalities after being cleared for contradictions for 10 minutes:   -Patient received paraffin bath to R hand(s)  to increase blood flow, circulation, pain management and for tissue elasticity prior to therex.         Kelley received the following manual therapy techniques for 10 minutes:   Pt received PROM to digits, isolated and composite x 10 reps each.  Gentle PROM to wrist and forearm as tolerated x 10 reps each.         Kelley received therapeutic exercises for 30 minutes including:   AROM Wrist  Ext/flx  RD/UD 2# flex, ext, RD, UD    X 10 reps each    Wave, hook, straight fist, composite fist,  finger spreads, finger lifts,  opposition   X 10 reps each    AROM   Sup/pro 0   Place and hold for composite fist X 10 reps with 5 sec hold   1# hammer stretch   Sup/pro   X 10 reps    Prayer stretch3 reps x 15 sec holdsLight yellow putty   , key, 2 and 3 pt pinch X 10 repsPinch strengthening  task with pom poms 4# with pom poms        Home Exercises and Education Provided     Education provided: reviewed HEP and frequency it should be performed, reviewed scar management and edema management.   - Progress towards goals     Written Home Exercises Provided: Patient instructed to cont prior HEP.  Exercises were reviewed and Kelley was able to demonstrate them prior to the end of the session.  Kelley demonstrated good  understanding of the education provided.   .   See EMR under Patient Instructions for exercises provided prior visit.     Assessment       Kelley is progressing fairly well towards her goals and there are no updates to goals at this time. Pt prognosis continues as Excellent. Pt cont to present with mild edema and stiffness in fingers and wrist, but improving.  Able to  make full fist today actively.  Wrist and forearm AROM steadily improving as well.  However, wrist ext and supination cont to be most limited. Cont to be somewhat guarded with R hand. Weakness and stiffness cont to limit pt functionally, along with precautions.   Pt with good participation and motivation.  Good compliance with HEP.  Pt will continue to benefit from skilled outpatient occupational therapy to address the deficits listed in the problem list on initial evaluation, provide pt/family education and to maximize pt's level of independence in the home and community environment.     Anticipated barriers to continued occupational therapy: none.    Pt's spiritual, cultural and educational needs considered and pt agreeable to plan of care and goals.    Goals     The following goals were discussed with the patient and patient is in  agreement with them as to be addressed in the treatment plan.   Long Term Goals (LTGs); to be met by discharge.  LTG #1: Pt will report a pain level of 0 out of 10 with daily tasks and ADLs   LTG #2: Pt will demo improved FOTO score by at least 20 points.   LTG #3: Pt will return to prior level of function for ADLs and household management.   LTG#4: Pt will demonstrate little to no edema in R hand and wrist for better use during ADLs  LTG #5:  and pinch to be assessed and goals set accordingly     Short Term Goals (STGs); to be met within 4 weeks (3/15/19).  STG #1a: Pt will report 2 out of 10 pain level at the worst with writing or ADLs.---met  STG #2a: Pt will demonstrate improved R wrist AROM by at least 10-15* for better use during daily tasks ---met, cont  STG #3a: Pt will demonstrate independence with issued HEP. ---met  STG #3b: Pt will demo improved R hand digits CORONA by at least 15-20 degrees needed to aid with grasping objects and holding things ---met, cont      Plan     Cont OT POC   2/15/219 to 4/12/2019     Discussed Plan of Care with patient: Yes  Updates/Grading for next session: progress as tolerated with ROM, and cont with scar and edema management    DELVIN Pratt, CHT

## 2019-04-09 ENCOUNTER — CLINICAL SUPPORT (OUTPATIENT)
Dept: REHABILITATION | Facility: HOSPITAL | Age: 79
End: 2019-04-09
Payer: MEDICARE

## 2019-04-09 DIAGNOSIS — M79.89 SWELLING OF RIGHT HAND: ICD-10-CM

## 2019-04-09 DIAGNOSIS — M25.531 WRIST PAIN, RIGHT: ICD-10-CM

## 2019-04-09 DIAGNOSIS — M25.60 DECREASED RANGE OF MOTION: ICD-10-CM

## 2019-04-09 PROCEDURE — 97110 THERAPEUTIC EXERCISES: CPT | Mod: HCNC

## 2019-04-09 PROCEDURE — 97140 MANUAL THERAPY 1/> REGIONS: CPT | Mod: HCNC

## 2019-04-11 NOTE — PROGRESS NOTES
"  Occupational Therapy Daily Treatment Note      Date: 4/9/2019  Name: Kelley Cruz  Clinic Number: [id number]    Therapy Diagnosis: R Distal radius fracture with ORIF 1/23/2019   Encounter Diagnoses   Name Primary?    Swelling of right hand     Wrist pain, right     Decreased range of motion      Medical Diagnosis: S52.501A (ICD-10-CM) - Closed fracture of distal end of right radius, unspecified fracture morphology, initial encounter  Date of Onset: 1/14/19  Date of Surgery: 1/23/19  Surgical Procedure: s/p ORIF of R DR fx    Precautions: Standard and Weightbearing; NWB      Physician: Jennifer Lewis PA; Dr. Lyndsey Carson  Physician Orders: eval and treat  Date of Return to MD: 4/18/2019     Evaluation Date: 2/15/2019  Plan of Care Certification Period: 4/12/19     Visit #: 12/ Visits authorized: 20   Insurance Authorization period Expiration: 12/31/19  MEDICARE FO (VISIT 1, 6)     Time In: 830 AM  Time Out: 905  Total Billable Time: 35 minutes      Subjective     Pt reports: "I think its getting better, the swelling is less and I'm usin git more.  "   Shortened session due to patient tardiness this date       Pt reports has been trying to use her R hand a bit more. Has been writing with R hand, and tying shoes with  R hand.  Pt reports she has been trying to use R hand a little more at home for light tasks. Encouraged pt to cont using her R hand for light activities, such as grooming, self feeding, and writing.  Encouraged gradual weaning from brace at home for light activity and ROM but continue orthosis use when out of house or for lifting, carrying.   she was compliant with home exercise program given last session. Reviewed performing HEP ~4x/day  Response to previous treatment:good, no adverse effects  Functional change: cont to be significantly limited functionally with R hand, but able to write and assist with tying shoes    Pain: 0/10  Location: right hand/wrist, reports stiffness in " fingers still    Objective     RE-assessment on 3/12/19 as follows:  Edema. Measured in centimeters.  Mild edema noted in fingers as well    2/15/2019 2/15/2019 3/12/19     Left Right Right   Wrist Crease 16.5 19 18.5 (-0.5)   DPC 19.3 20.7 20.4 (-0.3)   MCPs 19.1 19.7 19.9 (+0.2)            Elbow and Wrist ROM. Measured in degrees.    2/15/2019 2/15/2019 3/12/19     Left Right Right   Elbow Ext/Flex WFL 22/WNL WFL   Supination/Pronation WFL 10/75 36/76 (+26/+1)   Wrist Ext/Flex 60/70 0/36 20/52 (+20/+16)   Wrist RD/UD 12/42 8/8 18/20 (+10/+12)     Hand ROM. Measured in degrees.    2/15/2019 3/12/19     Right Right          Index: MP  15/63 70              PIP     55 77              DIP 17 25              CORONA 120 172 (+52)          Long:  MP 12/60 67              PIP 60 87              DIP 17 45              CORONA 125 199 (+74)          Ring:   MP 10/55 67              PIP 57 90              DIP 15 35              CORONA 117 192 (+75)          Small:  MP 10/50 60               PIP 52 80               DIP 20 45              CORONA 112 185 (+73)          Thumb: MP 32 45 (+13)                IP 15 35 (+20)       Rad ADD/ABD 24/34 22/36  (+4)       Pal ADD/ABD 30/40 22/38 (+6)          Opposition To LF DIP, ~5 cm from DPC To tip of SF; ~1.5 from DPC         Pt is 8+ weeks post op.    Pt seen by OT this session. Treatment consisted of the following:    Kelley received the following supervised modalities after being cleared for contradictions for 10 minutes:   -Patient received paraffin bath to R hand(s)  to increase blood flow, circulation, pain management and for tissue elasticity prior to therex.         Kelley received the following manual therapy techniques for 10 minutes:   Pt received PROM to digits, isolated and composite x 10 reps each.  Gentle PROM to wrist and forearm as tolerated x 10 reps each.         Kelley received therapeutic exercises for 15 minutes including:   AROM Wrist  Ext/flx  RD/UD 2# flex, ext, RD, UD    X  10 reps each    Wave, hook, straight fist, composite fist, finger spreads, finger lifts,  opposition   X 10 reps each    AROM   Sup/pro 0   Place and hold for composite fist X 10 reps with 5 sec hold   1# hammer stretch   Sup/pro   X 10 reps    Prayer stretch3 reps x 15 sec holdsLight yellow putty   , key, 2 and 3 pt pinch X 10 repsPinch strengthening  task with pom poms 4# with pom poms        Home Exercises and Education Provided     Education provided: reviewed HEP and frequency it should be performed, reviewed scar management and edema management.   - Progress towards goals     Written Home Exercises Provided: Patient instructed to cont prior HEP.  Exercises were reviewed and Kelley was able to demonstrate them prior to the end of the session.  Kelley demonstrated good  understanding of the education provided.   .   See EMR under Patient Instructions for exercises provided prior visit.     Assessment       Kelley is progressing fairly well towards her goals and there are no updates to goals at this time. Pt prognosis continues as Excellent. Decreased edema noted.  Patient Able to  make full fist today actively.  Wrist and forearm AROM steadily improving as well.  However, wrist ext cont to be most limited. Improved hand use reported for household activities. Weakness and stiffness cont to limit pt functionally, along with precautions.   Pt with good participation and motivation.  Good compliance with HEP.  Pt will continue to benefit from skilled outpatient occupational therapy to address the deficits listed in the problem list on initial evaluation, provide pt/family education and to maximize pt's level of independence in the home and community environment.     Anticipated barriers to continued occupational therapy: none.    Pt's spiritual, cultural and educational needs considered and pt agreeable to plan of care and goals.    Goals     The following goals were discussed with the patient and patient is in  agreement with them as to be addressed in the treatment plan.   Long Term Goals (LTGs); to be met by discharge.  LTG #1: Pt will report a pain level of 0 out of 10 with daily tasks and ADLs   LTG #2: Pt will demo improved FOTO score by at least 20 points.   LTG #3: Pt will return to prior level of function for ADLs and household management.   LTG#4: Pt will demonstrate little to no edema in R hand and wrist for better use during ADLs  LTG #5:  and pinch to be assessed and goals set accordingly     Short Term Goals (STGs); to be met within 4 weeks (3/15/19).  STG #1a: Pt will report 2 out of 10 pain level at the worst with writing or ADLs.---met  STG #2a: Pt will demonstrate improved R wrist AROM by at least 10-15* for better use during daily tasks ---met, cont  STG #3a: Pt will demonstrate independence with issued HEP. ---met  STG #3b: Pt will demo improved R hand digits CORONA by at least 15-20 degrees needed to aid with grasping objects and holding things ---met, cont      Plan     Cont OT POC   2/15/219 to 4/12/2019     Discussed Plan of Care with patient: Yes  Updates/Grading for next session: progress as tolerated with ROM, and cont with scar and edema management    DELVIN Pratt, CHT

## 2019-04-18 ENCOUNTER — HOSPITAL ENCOUNTER (OUTPATIENT)
Dept: RADIOLOGY | Facility: OTHER | Age: 79
Discharge: HOME | End: 2019-04-18
Attending: PHYSICIAN ASSISTANT
Payer: MEDICARE

## 2019-04-18 ENCOUNTER — OFFICE VISIT (OUTPATIENT)
Dept: ORTHOPEDICS | Facility: CLINIC | Age: 79
End: 2019-04-18
Payer: MEDICARE

## 2019-04-18 ENCOUNTER — CLINICAL SUPPORT (OUTPATIENT)
Dept: REHABILITATION | Facility: HOSPITAL | Age: 79
End: 2019-04-18
Attending: PHYSICIAN ASSISTANT
Payer: MEDICARE

## 2019-04-18 VITALS
WEIGHT: 180 LBS | BODY MASS INDEX: 31.89 KG/M2 | DIASTOLIC BLOOD PRESSURE: 76 MMHG | SYSTOLIC BLOOD PRESSURE: 155 MMHG | HEART RATE: 60 BPM | HEIGHT: 63 IN

## 2019-04-18 DIAGNOSIS — M79.89 SWELLING OF RIGHT HAND: ICD-10-CM

## 2019-04-18 DIAGNOSIS — M25.60 DECREASED RANGE OF MOTION: ICD-10-CM

## 2019-04-18 DIAGNOSIS — S52.501A CLOSED FRACTURE OF DISTAL END OF RIGHT RADIUS, UNSPECIFIED FRACTURE MORPHOLOGY, INITIAL ENCOUNTER: Primary | ICD-10-CM

## 2019-04-18 DIAGNOSIS — M25.531 WRIST PAIN, RIGHT: ICD-10-CM

## 2019-04-18 DIAGNOSIS — S52.501A CLOSED FRACTURE OF DISTAL END OF RIGHT RADIUS, UNSPECIFIED FRACTURE MORPHOLOGY, INITIAL ENCOUNTER: ICD-10-CM

## 2019-04-18 PROCEDURE — 97140 MANUAL THERAPY 1/> REGIONS: CPT | Mod: HCNC

## 2019-04-18 PROCEDURE — 73110 XR WRIST COMPLETE 3 VIEWS RIGHT: ICD-10-PCS | Mod: 26,HCNC,RT, | Performed by: RADIOLOGY

## 2019-04-18 PROCEDURE — 99999 PR PBB SHADOW E&M-EST. PATIENT-LVL III: CPT | Mod: PBBFAC,HCNC,, | Performed by: PHYSICIAN ASSISTANT

## 2019-04-18 PROCEDURE — 99024 PR POST-OP FOLLOW-UP VISIT: ICD-10-PCS | Mod: HCNC,S$GLB,, | Performed by: PHYSICIAN ASSISTANT

## 2019-04-18 PROCEDURE — 73110 X-RAY EXAM OF WRIST: CPT | Mod: 26,HCNC,RT, | Performed by: RADIOLOGY

## 2019-04-18 PROCEDURE — 99024 POSTOP FOLLOW-UP VISIT: CPT | Mod: HCNC,S$GLB,, | Performed by: PHYSICIAN ASSISTANT

## 2019-04-18 PROCEDURE — 99999 PR PBB SHADOW E&M-EST. PATIENT-LVL III: ICD-10-PCS | Mod: PBBFAC,HCNC,, | Performed by: PHYSICIAN ASSISTANT

## 2019-04-18 PROCEDURE — 97018 PARAFFIN BATH THERAPY: CPT | Mod: 59,HCNC

## 2019-04-18 PROCEDURE — 97110 THERAPEUTIC EXERCISES: CPT | Mod: HCNC

## 2019-04-18 PROCEDURE — 73110 X-RAY EXAM OF WRIST: CPT | Mod: TC,HCNC,FY,RT

## 2019-04-18 NOTE — PROGRESS NOTES
"  Occupational Therapy Daily Treatment Note      Date: 4/18/2019  Name: Kelley Cruz  Clinic Number: [id number]    Therapy Diagnosis: R Distal radius fracture with ORIF 1/23/2019   No diagnosis found.  Medical Diagnosis: S52.501A (ICD-10-CM) - Closed fracture of distal end of right radius, unspecified fracture morphology, initial encounter  Date of Onset: 1/14/19  Date of Surgery: 1/23/19  Surgical Procedure: s/p ORIF of R DR fx    Precautions: Standard and Weightbearing; NWB      Physician: Jennifer Lewis PA; Dr. Lyndsey Carson  Physician Orders: eval and treat  Date of Return to MD: 4/18/2019     Evaluation Date: 2/15/2019  Plan of Care Certification Period: 4/12/19     Visit #: 12/ Visits authorized: 20   Insurance Authorization period Expiration: 12/31/19  MEDICARE FOTO (VISIT 1, 6)     Time In: 830 AM  Time Out: 905  Total Billable Time: 35 minutes      Subjective     Pt reports: "I think its getting better, the swelling is less and I'm using it more.  "   Shortened session due to patient tardiness this date       Pt reports has been trying to use her R hand a bit more. Has been writing with R hand, and tying shoes with  R hand.  Pt reports she has been trying to use R hand a little more at home for light tasks. Encouraged pt to cont using her R hand for light activities, such as grooming, self feeding, and writing.  Encouraged gradual weaning from brace at home for light activity and ROM but continue orthosis use when out of house or for lifting, carrying.   she was compliant with home exercise program given last session. Reviewed performing HEP ~4x/day  Response to previous treatment:good, no adverse effects  Functional change: cont to be significantly limited functionally with R hand, but able to write and assist with tying shoes    Pain: 0/10  Location: right hand/wrist, reports stiffness in fingers still    Objective     RE-assessment on 3/12/19 as follows:  Edema. Measured in " centimeters.  Mild edema noted in fingers as well    2/15/2019 2/15/2019 3/12/19     Left Right Right   Wrist Crease 16.5 19 18.5 (-0.5)   DPC 19.3 20.7 20.4 (-0.3)   MCPs 19.1 19.7 19.9 (+0.2)            Elbow and Wrist ROM. Measured in degrees.    2/15/2019 2/15/2019 3/12/19     Left Right Right   Elbow Ext/Flex WFL 22/WNL WFL   Supination/Pronation WFL 10/75 75/80 (+66/+5)   Wrist Ext/Flex 60/70 0/36 30/52 (+30/+16)   Wrist RD/UD 12/42 8/8 18/20 (+10/+12)     Hand ROM. Measured in degrees.---wfl's, full opposition      R) 14 lbs.  L) 38 lbs.   Key pinch R) 3  L) 6       Pt is 8+ weeks post op.    Pt seen by OT this session. Treatment consisted of the following:    Kelley received the following supervised modalities after being cleared for contradictions for 10 minutes:   -Patient received paraffin bath to R hand(s)  to increase blood flow, circulation, pain management and for tissue elasticity prior to therex.         Kelley received the following manual therapy techniques for 10 minutes:   Pt received PROM to digits, isolated and composite x 10 reps each.  Gentle PROM to wrist and forearm as tolerated x 10 reps each.         Kelley received therapeutic exercises for 15 minutes including:   AROM Wrist  Ext/flx  RD/UD 2# flex, ext, RD, UD    X 10 reps each    Wave, hook, straight fist, composite fist, finger spreads, finger lifts,  opposition   X 10 reps each    AROM   Sup/pro 1.0 # hammer X 10 reps each   Place and hold for composite fist X 10 reps with 5 sec hold   1# hammer stretch   Sup/pro   X 10 reps    Prayer stretch3 reps x 15 sec holdsLight yellow putty   , key, 2 and 3 pt pinch X 10 repsPinch strengthening  task with pom poms 4# with pom poms        Home Exercises and Education Provided     Education provided: reviewed HEP and frequency it should be performed, reviewed scar management and edema management.   - Progress towards goals     Written Home Exercises Provided: Patient instructed to cont  prior HEP.  Exercises were reviewed and Kelley was able to demonstrate them prior to the end of the session.  Kelley demonstrated good  understanding of the education provided.   .   See EMR under Patient Instructions for exercises provided prior visit.     Assessment       Kelley is progressing fairly well towards her goals and there are no updates to goals at this time. Pt prognosis continues as Excellent. Decreased edema noted.  Patient Able to  make full fist today actively.  Wrist and forearm AROM steadily improving as well.  However, wrist ext cont to be most limited. Improved hand use reported for household activities. Weakness and stiffness cont to limit pt functionally, along with precautions.   Pt with good participation and motivation.  Good compliance with HEP.  Pt will continue to benefit from skilled outpatient occupational therapy to address the deficits listed in the problem list on initial evaluation, provide pt/family education and to maximize pt's level of independence in the home and community environment.     Anticipated barriers to continued occupational therapy: none.    Pt's spiritual, cultural and educational needs considered and pt agreeable to plan of care and goals.    Goals     The following goals were discussed with the patient and patient is in agreement with them as to be addressed in the treatment plan.   Long Term Goals (LTGs); to be met by discharge.  LTG #1: Pt will report a pain level of 0 out of 10 with daily tasks and ADLs   LTG #2: Pt will demo improved FOTO score by at least 20 points.   LTG #3: Pt will return to prior level of function for ADLs and household management.   LTG#4: Pt will demonstrate little to no edema in R hand and wrist for better use during ADLs  LTG #5:  and pinch to be assessed and goals set accordingly     Short Term Goals (STGs); to be met within 4 weeks (3/15/19).  STG #1a: Pt will report 2 out of 10 pain level at the worst with writing or  ADLs.---met  STG #2a: Pt will demonstrate improved R wrist AROM by at least 10-15* for better use during daily tasks ---met, cont  STG #3a: Pt will demonstrate independence with issued HEP. ---met  STG #3b: Pt will demo improved R hand digits CORONA by at least 15-20 degrees needed to aid with grasping objects and holding things ---met, cont      Plan     Cont OT POC   2/15/219 to 4/12/2019     Discussed Plan of Care with patient: Yes  Updates/Grading for next session: progress as tolerated with ROM, and cont with scar and edema management    DELVIN Pratt, CHT

## 2019-04-18 NOTE — PROGRESS NOTES
"Ms. Cruz is here today for a post-operative visit.  She is 85 days status post Open reduction and internal fixation of right intraarticular 3+ part distal radius fracture by Dr. Carson on 1/23/19. She reports that she is doing well.  She denies any current pain. She is regularly attending OT, trying to do her HEP regularly.  She has discontinued the wrist brace.  She takes Tylenol as needed. She denies fever, chills, and sweats since the time of the surgery.     Physical exam:    Vitals:    04/18/19 1007   BP: (!) 155/76   Pulse: 60   Weight: 81.6 kg (180 lb)   Height: 5' 3" (1.6 m)   PainSc: 0-No pain     Vital signs are stable, patient is afebrile.  Patient is well dressed and well groomed, no acute distress.  Alert and oriented to person, place, and time.  Incision is healing well - clean, dry and intact. Improved edema of the fingers, hand, and wrist. There is no erythema or exudate.  There is no sign of any infection. She is NVI. Good finger ROM. Improving wrist ROM, decreased extension compared to left. Improving supination.    Assessment: 85 days status post Open reduction and internal fixation of right intraarticular 3+ part distal radius fracture        Plan:  Kelley was seen today for post-op evaluation.    Diagnoses and all orders for this visit:    Closed fracture of distal end of right radius, unspecified fracture morphology, initial encounter    Decreased range of motion          - Continue OT and HEP  - Follow up as needed  - Call with questions or concerns    "

## 2019-04-18 NOTE — LETTER
April 18, 2019      [facility] [address] [facility] [address]  [address], [address]  [facility] [address]  Phone: [phone]       Patient: Kelley Cruz   YOB: 1940  Date of Visit: 04/18/2019    To Whom It May Concern:    Ave Cruz was at [facility] on 04/18/2019 for a 3 month post-operative visit. She has been attending occupational therapy to regain the function of her right wrist after undergoing surgery for a right distal radius fracture by Dr. Carson on 1/23/19.  The fracture occurred on 1/14/19.  If you have any questions or concerns, or if I can be of further assistance, please do not hesitate to contact me.    Sincerely,        MELISSA Mckinley

## 2019-04-22 ENCOUNTER — LAB VISIT (OUTPATIENT)
Dept: LAB | Facility: HOSPITAL | Age: 79
End: 2019-04-22
Attending: INTERNAL MEDICINE
Payer: MEDICARE

## 2019-04-22 DIAGNOSIS — E78.2 HYPERLIPIDEMIA, MIXED: ICD-10-CM

## 2019-04-22 LAB
CHOLEST SERPL-MCNC: 198 MG/DL (ref 120–199)
CHOLEST/HDLC SERPL: 3 {RATIO} (ref 2–5)
HDLC SERPL-MCNC: 65 MG/DL (ref 40–75)
HDLC SERPL: 32.8 % (ref 20–50)
LDLC SERPL CALC-MCNC: 103 MG/DL (ref 63–159)
NONHDLC SERPL-MCNC: 133 MG/DL
TRIGL SERPL-MCNC: 150 MG/DL (ref 30–150)

## 2019-04-22 PROCEDURE — 80061 LIPID PANEL: CPT | Mod: HCNC

## 2019-04-22 PROCEDURE — 36415 COLL VENOUS BLD VENIPUNCTURE: CPT | Mod: HCNC,PO

## 2019-04-26 ENCOUNTER — CLINICAL SUPPORT (OUTPATIENT)
Dept: REHABILITATION | Facility: HOSPITAL | Age: 79
End: 2019-04-26
Payer: MEDICARE

## 2019-04-26 DIAGNOSIS — M25.60 DECREASED RANGE OF MOTION: ICD-10-CM

## 2019-04-26 DIAGNOSIS — M79.89 SWELLING OF RIGHT HAND: ICD-10-CM

## 2019-04-26 DIAGNOSIS — M25.531 WRIST PAIN, RIGHT: ICD-10-CM

## 2019-04-26 PROCEDURE — 97140 MANUAL THERAPY 1/> REGIONS: CPT | Mod: HCNC

## 2019-04-26 PROCEDURE — 97110 THERAPEUTIC EXERCISES: CPT | Mod: HCNC

## 2019-04-26 PROCEDURE — 97018 PARAFFIN BATH THERAPY: CPT | Mod: HCNC,59

## 2019-04-26 NOTE — PROGRESS NOTES
"  Occupational Therapy Progress Note      Date: 4/26/2019  Name: Kelley Cruz  Clinic Number: [id number]    Therapy Diagnosis: R Distal radius fracture with ORIF 1/23/2019   Encounter Diagnoses   Name Primary?    Swelling of right hand     Wrist pain, right     Decreased range of motion      Medical Diagnosis: S52.501A (ICD-10-CM) - Closed fracture of distal end of right radius, unspecified fracture morphology, initial encounter  Date of Onset: 1/14/19  Date of Surgery: 1/23/19  Surgical Procedure: s/p ORIF of R DR fx    Precautions: Standard and Weightbearing     Physician: Jennifer Lewis PA; Dr. Lyndsey Carson  Physician Orders: eval and treat  Date of Return to MD: 4/18/2019     Evaluation Date: 2/15/2019  Plan of Care Certification Period: 4/12/19     Visit #: 13/ Visits authorized: 20   Insurance Authorization period Expiration: 12/31/19  MEDICARE FOTO (VISIT 1, 6)     Time In: 9:35 AM  Time Out: 10:15 am  Total Billable Time: 40 minutes      Subjective     Pt reports: "I think its getting better. It's still kind of achy."   Shortened session due to patient tardiness this date       Pt reports has been trying to use her R hand a bit more. Has been writing with R hand, and tying shoes with  R hand.  Pt reports she has been trying to use R hand a little more at home for light tasks. Encouraged pt to cont using her R hand for light activities, such as grooming, self feeding, and writing.      she was compliant with home exercise program given last session. Reviewed performing HEP ~4x/day  Response to previous treatment:good, no adverse effects  Functional change: cont to be significantly limited functionally with R hand, but able to write and assist with tying shoes    Pain: 0/10  Location: right hand/wrist, reports stiffness in fingers still    Objective     RE-assessment on 3/12/19 as follows:  Edema. Measured in centimeters.  Mild edema noted in fingers as well    2/15/2019 2/15/2019 3/12/19    "  Left Right Right   Wrist Crease 16.5 19 18.5 (-0.5)   DPC 19.3 20.7 20.4 (-0.3)   MCPs 19.1 19.7 19.9 (+0.2)            Elbow and Wrist ROM. Measured in degrees.    2/15/2019 2/15/2019 3/12/19 4/26/19     Left Right Right Right   Elbow Ext/Flex WFL 22/WNL WFL WFL   Supination/Pronation WFL 10/75 75/80 (+66/+5) 75/84 (=/+4)   Wrist Ext/Flex 60/70 0/36 30/52 (+30/+16) 52/60 (+22/+8)   Wrist RD/UD 12/42 8/8 18/20 (+10/+12) 22/32 (+4/+12)     Hand ROM. Measured in degrees.---wfl's, full opposition     4/26/19:   R) 24 lbs.(+10)  L) 38 lbs.   Key pinch R) 3  L) 6       Pt is 8+ weeks post op.    Pt seen by OT this session. Treatment consisted of the following:    Kelley received the following supervised modalities after being cleared for contradictions for 10 minutes:   -Patient received paraffin bath to R hand(s)  to increase blood flow, circulation, pain management and for tissue elasticity prior to therex.         Kelley received the following manual therapy techniques for 10 minutes:   Pt received PROM to digits, isolated and composite x 10 reps each.  Gentle PROM to wrist and forearm as tolerated x 10 reps each.         Kelley received therapeutic exercises for 20 minutes including:   AROM Wrist  Ext/flx  RD/UD 2# flex, ext, RD, UD    X 10 reps each    Wave, hook, straight fist, composite fist, finger spreads, finger lifts,  opposition   X 10 reps each    AROM   Sup/pro  X 10 reps each   Wrist PREs, 2# 2 x 10 reps each   1# hammer stretch   Sup/pro   2 X 10 reps    Prayer stretch3 reps x 15 sec holdsRed putty   , key, 2 and 3 pt pinch X 10 reps (not today)Pinch strengthening  task with pom poms 4# with pom poms (not today)       Home Exercises and Education Provided     Education provided: reviewed HEP and frequency it should be performed, reviewed scar management and edema management.   - Progress towards goals     Written Home Exercises Provided: Patient instructed to cont prior HEP.  Exercises were reviewed  and Kelley was able to demonstrate them prior to the end of the session.  Kelley demonstrated good  understanding of the education provided.   .   See EMR under Patient Instructions for exercises provided prior visit.     Assessment       Kelley is progressing fairly well towards her goals and there are no updates to goals at this time. Pt prognosis continues as Excellent. Decreased edema noted.   Wrist and forearm AROM steadily improving as well.  However, wrist ext cont to be most limited. Improved hand use reported for household activities. Weakness and stiffness cont to limit pt functionally, along with precautions. Pt with good tolerance of light strengthening.   Pt with good participation and motivation.  Good compliance with HEP.  Pt will continue to benefit from skilled outpatient occupational therapy to address the deficits listed in the problem list on initial evaluation, provide pt/family education and to maximize pt's level of independence in the home and community environment.     Anticipated barriers to continued occupational therapy: none.    Pt's spiritual, cultural and educational needs considered and pt agreeable to plan of care and goals.    Goals     The following goals were discussed with the patient and patient is in agreement with them as to be addressed in the treatment plan.   Long Term Goals (LTGs); to be met by discharge.  LTG #1: Pt will report a pain level of 0 out of 10 with daily tasks and ADLs ---not consistently met  LTG #2: Pt will demo improved FOTO score by at least 20 points. ---not met  LTG #3: Pt will return to prior level of function for ADLs and household management. ---progressing  LTG#4: Pt will demonstrate little to no edema in R hand and wrist for better use during ADLs ---progressing  LTG #5:  and pinch to be assessed and goals set accordingly---met     Short Term Goals (STGs); to be met within 4 weeks (3/15/19).  STG #1a: Pt will report 2 out of 10 pain level at the  worst with writing or ADLs.---met  STG #2a: Pt will demonstrate improved R wrist AROM by at least 10-15* for better use during daily tasks ---met, cont  STG #3a: Pt will demonstrate independence with issued HEP. ---met, ongoing  STG #3b: Pt will demo improved R hand digits CORONA by at least 15-20 degrees needed to aid with grasping objects and holding things ---met, cont      Plan     Cont OT POC 2x/week for 6 more weeks during certification period 4/12/19 to 5/24/19 in pursuit of established goals.     Discussed Plan of Care with patient: Yes  Updates/Grading for next session: progress as tolerated with ROM, and cont with scar and edema management    DELVIN Pratt, CHT

## 2019-04-30 NOTE — PLAN OF CARE
Outpatient Therapy Updated Plan of Care     Visit Date: 4/26/2019  Name: Kelley Cruz  Clinic Number: [id number]    Therapy Diagnosis:   Encounter Diagnoses   Name Primary?    Swelling of right hand     Wrist pain, right     Decreased range of motion      Medical Diagnosis: S52.501A (ICD-10-CM) - Closed fracture of distal end of right radius, unspecified fracture morphology, initial encounter  Date of Onset: 1/14/19  Date of Surgery: 1/23/19  Surgical Procedure: s/p ORIF of R DR marcos    Precautions: Standard and Weightbearing     Physician: Jennifer Lewis PA; Dr. Lyndsey Carson  Physician Orders: eval and treat  Date of Return to MD: 4/18/2019     Evaluation Date: 2/15/2019  Plan of Care Certification Period: 4/12/19     Visit #: 13/ Visits authorized: 20     Cancelled Visits: 0  No Show Visits: 0    Current Certification Period:  2/14/19 to 4/12/19  Precautions:  standard  Visits from Evaluation Date:  13  Functional Level Prior to Evaluation:  independent    Subjective     Update: See note.    Objective     Update:   Elbow and Wrist ROM. Measured in degrees.    2/15/2019 2/15/2019 3/12/19 4/26/19     Left Right Right Right   Elbow Ext/Flex WFL 22/WNL WFL WFL   Supination/Pronation WFL 10/75 75/80 (+66/+5) 75/84 (=/+4)   Wrist Ext/Flex 60/70 0/36 30/52 (+30/+16) 52/60 (+22/+8)   Wrist RD/UD 12/42 8/8 18/20 (+10/+12) 22/32 (+4/+12)     Hand ROM. Measured in degrees.---wfl's, full opposition     4/26/19:   R) 24 lbs.(+10)  L) 38 lbs.   Key pinch R) 3  L) 6       Assessment     Update:   Kelley is progressing fairly well towards her goals and there are no updates to goals at this time. Pt prognosis continues as Excellent. Decreased edema noted.   Wrist and forearm AROM steadily improving as well.  However, wrist ext cont to be most limited. Improved hand use reported for household activities. Weakness and stiffness cont to limit pt functionally, along with precautions. Pt with good tolerance of  light strengthening.   Pt with good participation and motivation.  Good compliance with HEP.  Pt will continue to benefit from skilled outpatient occupational therapy to address the deficits listed in the problem list on initial evaluation, provide pt/family education and to maximize pt's level of independence in the home and community environment.         Previous Short Term Goals Status:     The following goals were discussed with the patient and patient is in agreement with them as to be addressed in the treatment plan.   Long Term Goals (LTGs); to be met by discharge.  LTG #1: Pt will report a pain level of 0 out of 10 with daily tasks and ADLs ---not consistently met  LTG #2: Pt will demo improved FOTO score by at least 20 points. ---not met  LTG #3: Pt will return to prior level of function for ADLs and household management. ---progressing  LTG#4: Pt will demonstrate little to no edema in R hand and wrist for better use during ADLs ---progressing  LTG #5:  and pinch to be assessed and goals set accordingly---met     Short Term Goals (STGs); to be met within 4 weeks (3/15/19).  STG #1a: Pt will report 2 out of 10 pain level at the worst with writing or ADLs.---met  STG #2a: Pt will demonstrate improved R wrist AROM by at least 10-15* for better use during daily tasks ---met, cont  STG #3a: Pt will demonstrate independence with issued HEP. ---met, ongoing  STG #3b: Pt will demo improved R hand digits CORONA by at least 15-20 degrees needed to aid with grasping objects and holding things ---met, cont    New Short Term Goals Status:   Cont STG 3  Long Term Goal Status:   continue per initial plan of care.  Add: #6: Pt will demonstrate improved R  strength by at least 3-5# for improved use during activities, #7: Pt will demonstrate improved pinch strength by at least 2-4 psi for improvement with writing and turning keys  Reasons for Recertification of Therapy:   Pt making goo progress in therapy. She would  like to cont to address ROM and strengthening.    Plan     Updated Certification Period: 4/12/19 to 5/24/19  Recommended Treatment Plan: 2 times per week for 8 weeks: Fluidotherapy, Manual Therapy, Moist Heat/ Ice, Paraffin, Patient Education, Therapeutic Activites and Therapeutic Exercise  Other Recommendations: n/a    DELVIN Del Toro  4/26/2019      I CERTIFY THE NEED FOR THESE SERVICES FURNISHED UNDER THIS PLAN OF TREATMENT AND WHILE UNDER MY CARE    Physician's comments:        Physician's Signature: ___________________________________________________

## 2019-05-02 ENCOUNTER — CLINICAL SUPPORT (OUTPATIENT)
Dept: REHABILITATION | Facility: HOSPITAL | Age: 79
End: 2019-05-02
Attending: PHYSICIAN ASSISTANT
Payer: MEDICARE

## 2019-05-02 DIAGNOSIS — M25.531 WRIST PAIN, RIGHT: ICD-10-CM

## 2019-05-02 DIAGNOSIS — M25.60 DECREASED RANGE OF MOTION: ICD-10-CM

## 2019-05-02 DIAGNOSIS — M79.89 SWELLING OF RIGHT HAND: ICD-10-CM

## 2019-05-02 PROCEDURE — 97140 MANUAL THERAPY 1/> REGIONS: CPT | Mod: HCNC

## 2019-05-02 PROCEDURE — 97018 PARAFFIN BATH THERAPY: CPT | Mod: 59,HCNC

## 2019-05-02 PROCEDURE — 97110 THERAPEUTIC EXERCISES: CPT | Mod: HCNC

## 2019-05-02 NOTE — PROGRESS NOTES
"  Occupational Therapy Progress Note      Date: 5/2/2019  Name: Kelley Cruz  Clinic Number: [id number]    Therapy Diagnosis: R Distal radius fracture with ORIF 1/23/2019   Encounter Diagnoses   Name Primary?    Swelling of right hand     Wrist pain, right     Decreased range of motion      Medical Diagnosis: S52.501A (ICD-10-CM) - Closed fracture of distal end of right radius, unspecified fracture morphology, initial encounter  Date of Onset: 1/14/19  Date of Surgery: 1/23/19  Surgical Procedure: s/p ORIF of R DR fx    Precautions: Standard and Weightbearing     Physician: Jennifer Lewis PA; Dr. Lyndsey Carson  Physician Orders: eval and treat  Date of Return to MD: 4/18/2019     Evaluation Date: 2/15/2019  Plan of Care Certification Period: 4/12/19     Visit #: 15/ Visits authorized: 20   Insurance Authorization period Expiration: 12/31/19  MEDICARE FOTO (VISIT 1, 6)     Time In: 9:25 AM  Time Out: 10:10 am  Total Billable Time: 45 minutes      Subjective     Patient has 5 remaining OT visits  Pt reports: "I think I just need to use it more, I still favor it sometimes and use my left hand to lift things"   Shortened session due to patient tardiness this date       Pt reports has been trying to use her R hand a bit more. Has been writing with R hand, and tying shoes with  R hand.  Pt reports she has been trying to use R hand a little more at home for light tasks. Encouraged pt to cont using her R hand for light activities, such as grooming, self feeding, and writing.      she was compliant with home exercise program given last session. Reviewed performing HEP ~4x/day  Response to previous treatment:good, no adverse effects  Functional change: cont to be significantly limited functionally with R hand, but able to write and assist with tying shoes    Pain: 0/10  Location: right hand/wrist, reports stiffness in fingers still    Objective     RE-assessment on 3/12/19 as follows:  Edema. Measured in " centimeters.  Mild edema noted in fingers as well    2/15/2019 2/15/2019 3/12/19     Left Right Right   Wrist Crease 16.5 19 18.5 (-0.5)   DPC 19.3 20.7 20.4 (-0.3)   MCPs 19.1 19.7 19.9 (+0.2)            Elbow and Wrist ROM. Measured in degrees.    2/15/2019 2/15/2019 3/12/19 4/26/19     Left Right Right Right   Elbow Ext/Flex WFL 22/WNL WFL WFL   Supination/Pronation WFL 10/75 75/80 (+66/+5) 75/84 (=/+4)   Wrist Ext/Flex 60/70 0/36 30/52 (+30/+16) 52/60 (+22/+8)   Wrist RD/UD 12/42 8/8 18/20 (+10/+12) 22/32 (+4/+12)     Hand ROM. Measured in degrees.---wfl's, full opposition     4/26/19:   R) 24 lbs.(+10)  L) 38 lbs.   Key pinch R) 3  L) 6       Pt is 12+ weeks post op.    Pt seen by OT this session. Treatment consisted of the following:    Kelley received the following supervised modalities after being cleared for contradictions for 10 minutes:   -Patient received paraffin bath to R hand(s)  to increase blood flow, circulation, pain management and for tissue elasticity prior to therex.         Kelley received the following manual therapy techniques for 10 minutes:   Pt received mobilization of wrist and PROM of wrist  And  digits, isolated and composite x 10 reps each.        Kelley received therapeutic exercises for 25 minutes including:   AROM Wrist  Ext/flx  RD/UD 2# flex, ext, RD, UD    X 10 reps each    Wave, hook, straight fist, composite fist, finger spreads, finger lifts,  opposition   X 10 reps each    AROM   Sup/pro  X 10 reps each   Wrist PREs, 2# 2 x 10 reps each   1# hammer stretch   Sup/pro   2 X 10 reps    Prayer stretch3 reps x 15 sec holdsRed putty   , key, 2 and 3 pt pinch X 10 reps Pinch strengthening  task with pom poms    37 lb. PHG  4# with pom poms         X 15 reps for  strengthening        Home Exercises and Education Provided     Education provided: reviewed HEP and frequency it should be performed, reviewed scar management and edema management.   - Progress towards goals      Written Home Exercises Provided: Patient instructed to cont prior HEP.  Exercises were reviewed and Kelley was able to demonstrate them prior to the end of the session.  Kelley demonstrated good  understanding of the education provided.   .   See EMR under Patient Instructions for exercises provided prior visit.     Assessment       Kelley is progressing fairly well towards her goals and there are no updates to goals at this time. Pt prognosis continues as Excellent. Decreased edema noted.   Wrist and forearm AROM steadily improving as well.  However, wrist ext cont to be most limited. Improved hand use reported for household activities. Weakness and stiffness cont to limit pt functionally.   Pt with good tolerance of light strengthening.   Pt with good participation and motivation.  Good compliance with HEP.  Pt will continue to benefit from skilled outpatient occupational therapy to address the deficits listed in the problem list on initial evaluation, provide pt/family education and to maximize pt's level of independence in the home and community environment.     Anticipated barriers to continued occupational therapy: none.    Pt's spiritual, cultural and educational needs considered and pt agreeable to plan of care and goals.    Goals     The following goals were discussed with the patient and patient is in agreement with them as to be addressed in the treatment plan.   Long Term Goals (LTGs); to be met by discharge.  LTG #1: Pt will report a pain level of 0 out of 10 with daily tasks and ADLs ---not consistently met  LTG #2: Pt will demo improved FOTO score by at least 20 points. ---not met  LTG #3: Pt will return to prior level of function for ADLs and household management. ---progressing  LTG#4: Pt will demonstrate little to no edema in R hand and wrist for better use during ADLs ---progressing  LTG #5:  and pinch to be assessed and goals set accordingly---met     Short Term Goals (STGs); to be met  within 4 weeks (3/15/19).  STG #1a: Pt will report 2 out of 10 pain level at the worst with writing or ADLs.---met  STG #2a: Pt will demonstrate improved R wrist AROM by at least 10-15* for better use during daily tasks ---met, cont  STG #3a: Pt will demonstrate independence with issued HEP. ---met, ongoing  STG #3b: Pt will demo improved R hand digits CORONA by at least 15-20 degrees needed to aid with grasping objects and holding things ---met, cont      Plan     Cont OT POC 2x/week for 6 more weeks during certification period 4/12/19 to 5/24/19 in pursuit of established goals.     Discussed Plan of Care with patient: Yes  Updates/Grading for next session: progress as tolerated with ROM, and cont with scar and edema management    DELVIN Pratt, CHT

## 2019-05-17 ENCOUNTER — OFFICE VISIT (OUTPATIENT)
Dept: FAMILY MEDICINE | Facility: CLINIC | Age: 79
End: 2019-05-17
Attending: FAMILY MEDICINE
Payer: MEDICARE

## 2019-05-17 VITALS
OXYGEN SATURATION: 94 % | TEMPERATURE: 99 F | HEIGHT: 63 IN | WEIGHT: 182.31 LBS | SYSTOLIC BLOOD PRESSURE: 112 MMHG | BODY MASS INDEX: 32.3 KG/M2 | HEART RATE: 83 BPM | DIASTOLIC BLOOD PRESSURE: 70 MMHG

## 2019-05-17 DIAGNOSIS — J18.9 PNEUMONIA OF LEFT LOWER LOBE DUE TO INFECTIOUS ORGANISM: ICD-10-CM

## 2019-05-17 DIAGNOSIS — H66.92 ACUTE OTITIS MEDIA, LEFT: Primary | ICD-10-CM

## 2019-05-17 PROCEDURE — 99499 RISK ADDL DX/OHS AUDIT: ICD-10-PCS | Mod: HCNC,S$GLB,, | Performed by: FAMILY MEDICINE

## 2019-05-17 PROCEDURE — 99999 PR PBB SHADOW E&M-EST. PATIENT-LVL III: CPT | Mod: PBBFAC,HCNC,, | Performed by: FAMILY MEDICINE

## 2019-05-17 PROCEDURE — 99499 UNLISTED E&M SERVICE: CPT | Mod: HCNC,S$GLB,, | Performed by: FAMILY MEDICINE

## 2019-05-17 PROCEDURE — 99214 OFFICE O/P EST MOD 30 MIN: CPT | Mod: HCNC,S$GLB,, | Performed by: FAMILY MEDICINE

## 2019-05-17 PROCEDURE — 3078F PR MOST RECENT DIASTOLIC BLOOD PRESSURE < 80 MM HG: ICD-10-PCS | Mod: HCNC,CPTII,S$GLB, | Performed by: FAMILY MEDICINE

## 2019-05-17 PROCEDURE — 1101F PT FALLS ASSESS-DOCD LE1/YR: CPT | Mod: HCNC,CPTII,S$GLB, | Performed by: FAMILY MEDICINE

## 2019-05-17 PROCEDURE — 3078F DIAST BP <80 MM HG: CPT | Mod: HCNC,CPTII,S$GLB, | Performed by: FAMILY MEDICINE

## 2019-05-17 PROCEDURE — 99214 PR OFFICE/OUTPT VISIT, EST, LEVL IV, 30-39 MIN: ICD-10-PCS | Mod: HCNC,S$GLB,, | Performed by: FAMILY MEDICINE

## 2019-05-17 PROCEDURE — 1101F PR PT FALLS ASSESS DOC 0-1 FALLS W/OUT INJ PAST YR: ICD-10-PCS | Mod: HCNC,CPTII,S$GLB, | Performed by: FAMILY MEDICINE

## 2019-05-17 PROCEDURE — 3074F SYST BP LT 130 MM HG: CPT | Mod: HCNC,CPTII,S$GLB, | Performed by: FAMILY MEDICINE

## 2019-05-17 PROCEDURE — 3074F PR MOST RECENT SYSTOLIC BLOOD PRESSURE < 130 MM HG: ICD-10-PCS | Mod: HCNC,CPTII,S$GLB, | Performed by: FAMILY MEDICINE

## 2019-05-17 PROCEDURE — 99999 PR PBB SHADOW E&M-EST. PATIENT-LVL III: ICD-10-PCS | Mod: PBBFAC,HCNC,, | Performed by: FAMILY MEDICINE

## 2019-05-17 RX ORDER — AZITHROMYCIN 250 MG/1
TABLET, FILM COATED ORAL
Qty: 6 TABLET | Refills: 0 | Status: SHIPPED | OUTPATIENT
Start: 2019-05-17 | End: 2019-05-22

## 2019-05-17 NOTE — PROGRESS NOTES
Subjective:       Patient ID: Kelley Cruz is a 79 y.o. female.    Chief Complaint: URI    URI    The current episode started in the past 7 days (a few days). The problem has been gradually improving. There has been no fever. Associated symptoms include coughing, rhinorrhea and sneezing. Pertinent negatives include no ear pain, headaches, plugged ear sensation, sinus pain, sore throat or swollen glands. She has tried acetaminophen, decongestant and antihistamine for the symptoms. The treatment provided mild relief.      No prior history of allergic rhinitis.    No history of asthma.  She states has been on losartan > 10 years.      Patient Active Problem List   Diagnosis    Hypertension    Osteoporosis with fracture    Right inguinal hernia    Closed fracture of right distal radius    Swelling of right hand    Wrist pain, right    Decreased range of motion       Current Outpatient Medications:     ascorbic acid, vitamin C, (VITAMIN C) 500 MG tablet, Take 1 tablet (500 mg total) by mouth once daily., Disp: 50 tablet, Rfl: 0    losartan (COZAAR) 25 MG tablet, Take 1 tablet (25 mg total) by mouth once daily., Disp: 90 tablet, Rfl: 1    multivitamin (MULTIVITAMIN) per tablet, Take 1 tablet by mouth once daily., Disp: , Rfl:     traMADol (ULTRAM) 50 mg tablet, Take 1 tablet (50 mg total) by mouth every 6 (six) hours as needed for Pain., Disp: 20 tablet, Rfl: 0    triamterene-hydrochlorothiazide 37.5-25 mg (DYAZIDE) 37.5-25 mg per capsule, Take 1 capsule by mouth once daily., Disp: 90 capsule, Rfl: 2    The following portions of the patient's history were reviewed and updated as appropriate: allergies, past family history, past medical history, past social history and past surgical history.    Review of Systems   HENT: Positive for rhinorrhea and sneezing. Negative for ear pain, sinus pain and sore throat.    Respiratory: Positive for cough.    Neurological: Negative for headaches.       Objective:  "     /70 (BP Location: Left arm, Patient Position: Sitting, BP Method: Large (Manual))   Pulse 83   Temp 98.7 °F (37.1 °C) (Oral)   Ht 5' 3" (1.6 m)   Wt 82.7 kg (182 lb 4.8 oz)   SpO2 (!) 94%   BMI 32.29 kg/m²     Physical Exam   Constitutional: She is oriented to person, place, and time. She appears well-developed and well-nourished.   HENT:   Head: Normocephalic and atraumatic.   Right Ear: Tympanic membrane, external ear and ear canal normal.   Left Ear: External ear and ear canal normal. Tympanic membrane is erythematous and bulging.   Nose: Mucosal edema and rhinorrhea present.   Mouth/Throat: No oropharyngeal exudate or posterior oropharyngeal erythema.   Eyes: Conjunctivae are normal. No scleral icterus.   Cardiovascular: Normal rate, regular rhythm and normal heart sounds. Exam reveals no gallop.   No murmur heard.  Pulmonary/Chest: She has no decreased breath sounds. She has no wheezes. She has rhonchi in the left lower field. She has rales in the left lower field.   Musculoskeletal: She exhibits no edema.   Neurological: She is alert and oriented to person, place, and time.   Skin: Skin is warm and dry.   Psychiatric: She has a normal mood and affect.   Vitals reviewed.        Assessment:       1. Acute otitis media, left    2. Pneumonia of left lower lobe due to infectious organism          Plan:       Given allergy to PCN, will treat with azithromycin.  Rest/fluids.  Continue OTC preparations as presently tasking.  Follow-up by phone/email in a few days.  \      "This note will not be shared with the patient."  "

## 2019-05-17 NOTE — PATIENT INSTRUCTIONS
Kelley,     We are always striving for excellence. Should you receive a patient experience survey in the mail, we would appreciate if you would take a few moments to give us your feedback. These surveys let us know our strengths as well as areas of opportunity for improvement to better serve you.    Thank you for your time,  Megan Benitez LPN    Test results will be communicated to you via : My [facility], Telephone or Letter.   If you have not received test results in one week, please contact the clinic at   [phone].          Call if:  * A high, prolonged fever (above 102 degrees)  * Symptoms that last for more than 10 days or get worse instead of better  * Trouble breathing or shortness of breath  * Pain or pressure in the chest  * Fainting or feeling like you are about to faint  * Confusion or disorientation  * Severe or persistent vomiting  * Severe pain in your face or forehead  * Hoarseness, sore throat or a cough that won't go away after 10 days

## 2019-05-20 ENCOUNTER — TELEPHONE (OUTPATIENT)
Dept: INTERNAL MEDICINE | Facility: CLINIC | Age: 79
End: 2019-05-20

## 2019-05-20 ENCOUNTER — TELEPHONE (OUTPATIENT)
Dept: ENDOSCOPY | Facility: HOSPITAL | Age: 79
End: 2019-05-20

## 2019-05-20 DIAGNOSIS — Z12.39 SCREENING BREAST EXAMINATION: Primary | ICD-10-CM

## 2019-05-20 NOTE — TELEPHONE ENCOUNTER
----- Message from Carlos Guevara sent at 5/20/2019  1:33 PM CDT -----  Contact: Patient [phone]  Caller is requesting to schedule their annual screening mammogram appointment. Order is not listed in Epic.  Please enter order and contact patient to schedule.

## 2019-05-20 NOTE — TELEPHONE ENCOUNTER
Called  pt. About Colonoscopy order put in system Oct.  2018. Pt. Stated that she thought she had had a colonoscopy in last 5 years. Pt. Sated she will check on that and notify her PCP if new order needs to be put in. According to present Guidelines pt. Will be due in . Order  and pt. Aware.

## 2019-05-24 ENCOUNTER — TELEPHONE (OUTPATIENT)
Dept: OPTOMETRY | Facility: CLINIC | Age: 79
End: 2019-05-24

## 2019-05-31 ENCOUNTER — HOSPITAL ENCOUNTER (OUTPATIENT)
Dept: RADIOLOGY | Facility: OTHER | Age: 79
Discharge: HOME | End: 2019-05-31
Attending: INTERNAL MEDICINE
Payer: MEDICARE

## 2019-05-31 DIAGNOSIS — Z12.39 SCREENING BREAST EXAMINATION: ICD-10-CM

## 2019-05-31 PROCEDURE — 77067 SCR MAMMO BI INCL CAD: CPT | Mod: 26,HCNC,, | Performed by: INTERNAL MEDICINE

## 2019-05-31 PROCEDURE — 77063 MAMMO DIGITAL SCREENING BILAT WITH TOMOSYNTHESIS_CAD: ICD-10-PCS | Mod: 26,HCNC,, | Performed by: INTERNAL MEDICINE

## 2019-05-31 PROCEDURE — 77067 SCR MAMMO BI INCL CAD: CPT | Mod: TC,HCNC

## 2019-05-31 PROCEDURE — 77063 BREAST TOMOSYNTHESIS BI: CPT | Mod: 26,HCNC,, | Performed by: INTERNAL MEDICINE

## 2019-05-31 PROCEDURE — 77067 MAMMO DIGITAL SCREENING BILAT WITH TOMOSYNTHESIS_CAD: ICD-10-PCS | Mod: 26,HCNC,, | Performed by: INTERNAL MEDICINE

## 2019-06-07 ENCOUNTER — CLINICAL SUPPORT (OUTPATIENT)
Dept: REHABILITATION | Facility: HOSPITAL | Age: 79
End: 2019-06-07
Payer: MEDICARE

## 2019-06-07 DIAGNOSIS — M25.60 DECREASED RANGE OF MOTION: ICD-10-CM

## 2019-06-07 DIAGNOSIS — M25.531 WRIST PAIN, RIGHT: ICD-10-CM

## 2019-06-07 DIAGNOSIS — M79.89 SWELLING OF RIGHT HAND: ICD-10-CM

## 2019-06-07 PROCEDURE — 97110 THERAPEUTIC EXERCISES: CPT | Mod: HCNC

## 2019-06-07 PROCEDURE — 97140 MANUAL THERAPY 1/> REGIONS: CPT | Mod: HCNC

## 2019-06-07 NOTE — PROGRESS NOTES
"  Occupational Therapy Progress Note & Discharge Summary     Date: 6/7/2019  Name: Kelley Cruz  Clinic Number: [id number]    Therapy Diagnosis: R Distal radius fracture with ORIF 1/23/2019   Encounter Diagnoses   Name Primary?    Swelling of right hand     Wrist pain, right     Decreased range of motion      Medical Diagnosis: S52.501A (ICD-10-CM) - Closed fracture of distal end of right radius, unspecified fracture morphology, initial encounter  Date of Onset: 1/14/19  Date of Surgery: 1/23/19  Surgical Procedure: s/p ORIF of R DR fx    Precautions: Standard and Weightbearing     Physician: Jennifer Lewis PA; Dr. Lyndsey Carson  Physician Orders: eval and treat  Date of Return to MD: 4/18/2019     Evaluation Date: 2/15/2019  Plan of Care Certification Period: 4/12/19     Visit #: 16/ Visits authorized: 20   Insurance Authorization period Expiration: 12/31/19  MEDICARE FOTO (VISIT 1, 6, 16) --31% limitation    Time In: 9:25 AM  Time Out: 10:10 am  Total Billable Time: 25 minutes      Subjective     Pt reports: "It's doing better. I still have trouble pulling weeds. I have some trouble reaching behind my back and doing some chores around the house. But I think I can do my exercises at home."   Shortened session due to patient tardiness this date     Pt reports has been trying to use her R hand more  Pt reports she has been trying to use R hand a little more at home for light tasks. Encouraged pt to cont using her R hand for activities, such as writing, cooking.      she was compliant with home exercise program given last session. Reviewed performing HEP ~4x/day  Response to previous treatment:good, no adverse effects  Functional change: cont to be limited functionally with R hand with gardening, household chores, and cooking.     Pain: 0/10  Location: right hand/wrist, reports stiffness in fingers still    Objective     RE-assessment on 3/12/19 as follows:  Edema. Measured in centimeters.  Mild " edema noted in fingers as well    2/15/2019 2/15/2019 3/12/19     Left Right Right   Wrist Crease 16.5 19 18.5 (-0.5)   DPC 19.3 20.7 20.4 (-0.3)   MCPs 19.1 19.7 19.9 (+0.2)            Elbow and Wrist ROM. Measured in degrees.    2/15/2019 2/15/2019 3/12/19 4/26/19 6/7/19     Left Right Right Right Right   Elbow Ext/Flex WFL 22/WNL WFL WFL WFL   Supination/Pronation WFL 10/75 75/80 (+66/+5) 75/84 (=/+4) 78/80   Wrist Ext/Flex 60/70 0/36 30/52 (+30/+16) 52/60 (+22/+8) 58/60   Wrist RD/UD 12/42 8/8 18/20 (+10/+12) 22/32 (+4/+12) 22/42     Hand ROM. Measured in degrees.---wfl's, full opposition     6/7/19:   R) 30 lbs.(+6)  L) 38 lbs.   Key pinch R) 4.5 (+1.5)  L) 6       Pt is 12+ weeks post op.    Pt seen by OT this session. Treatment consisted of the following:    Kelley received the following supervised modalities after being cleared for contradictions for 10 minutes:   -Patient received paraffin bath to R hand(s)  to increase blood flow, circulation, pain management and for tissue elasticity prior to therex.         Kelley received the following manual therapy techniques for 10 minutes:   Pt received mobilization of wrist and PROM of wrist  And  digits, isolated and composite x 10 reps each.        Kelley received therapeutic exercises for 25 minutes including: (15 min 1:1)  AROM Wrist  Ext/flx  RD/UD 2# flex, ext, RD, UD    X 10 reps each    Wave, hook, straight fist, composite fist, finger spreads, finger lifts,  opposition   X 10 reps each    AROM   Sup/pro  X 10 reps each   Wrist PREs, 2# 2 x 10 reps each   1# hammer stretch   Sup/pro   2 X 10 reps    Prayer stretch3 reps x 15 sec holdsRed putty   , key, 2 and 3 pt pinch X 10 reps Pinch strengthening  task with pom poms    37 lb. PHG  4# with pom poms         X 15 reps for  strengthening        Home Exercises and Education Provided     Education provided: reviewed HEP and frequency it should be performed, reviewed scar management and edema  management.   - Progress towards goals     Written Home Exercises Provided: Patient instructed to cont prior HEP.  Exercises were reviewed and Kelley was able to demonstrate them prior to the end of the session.  Kelley demonstrated good  understanding of the education provided.   .   See EMR under Patient Instructions for exercises provided prior visit.     Assessment     Pt partially met goals in therapy. She reports she feels ready to cont with HEP. Pt with good participation. Pt cont to present with some stiffness in R wrist and fingers with mild swelling, occasional pain, weakness with wrist and . However, pt demonstrates wrist and forearm AROM WFL,  strength WFL, and composite fist and opposition WFL. Pt reports she cont to e limited with some household activities, such as gardening and cooking. Pt has reached maximum benefit from OT services at this time. Recommend cont with HEP and stretches as tolerated.     Anticipated barriers to continued occupational therapy: none.    Pt's spiritual, cultural and educational needs considered and pt agreeable to plan of care and goals.    Goals     The following goals were discussed with the patient and patient is in agreement with them as to be addressed in the treatment plan.   Long Term Goals (LTGs); to be met by discharge.  LTG #1: Pt will report a pain level of 0 out of 10 with daily tasks and ADLs ---not consistently met  LTG #2: Pt will demo improved FOTO score by at least 20 points. --- met  LTG #3: Pt will return to prior level of function for ADLs and household management. ---not fully met  LTG#4: Pt will demonstrate little to no edema in R hand and wrist for better use during ADLs ---met  LTG #5:  and pinch to be assessed and goals set accordingly---met     Short Term Goals (STGs); to be met within 4 weeks (3/15/19).  STG #1a: Pt will report 2 out of 10 pain level at the worst with writing or ADLs.---met  STG #2a: Pt will demonstrate improved R wrist  AROM by at least 10-15* for better use during daily tasks ---met,   STG #3a: Pt will demonstrate independence with issued HEP. ---met  STG #3b: Pt will demo improved R hand digits CORONA by at least 15-20 degrees needed to aid with grasping objects and holding things ---met,      Plan     Pt POC ended on 5/31/19. She has not been to therapy until today. Pt would benefit from OT for this visit for discharge and review HEP.  Then discharge pt from skilled OT services at this time.     DELVIN Del Toro       I certify the need for these services furnished under the plan of treatment and while under my care.     ____________________________________________________________________  Physician/Referring Practitioner   Date of Signature

## 2019-06-21 ENCOUNTER — PATIENT OUTREACH (OUTPATIENT)
Dept: ADMINISTRATIVE | Facility: HOSPITAL | Age: 79
End: 2019-06-21

## 2019-07-01 ENCOUNTER — OFFICE VISIT (OUTPATIENT)
Dept: INTERNAL MEDICINE | Facility: CLINIC | Age: 79
End: 2019-07-01
Payer: MEDICARE

## 2019-07-01 ENCOUNTER — HOSPITAL ENCOUNTER (OUTPATIENT)
Dept: RADIOLOGY | Facility: HOSPITAL | Age: 79
Discharge: HOME | End: 2019-07-01
Attending: INTERNAL MEDICINE
Payer: MEDICARE

## 2019-07-01 VITALS
OXYGEN SATURATION: 96 % | TEMPERATURE: 98 F | WEIGHT: 178.56 LBS | SYSTOLIC BLOOD PRESSURE: 130 MMHG | HEART RATE: 68 BPM | HEIGHT: 63 IN | DIASTOLIC BLOOD PRESSURE: 70 MMHG | BODY MASS INDEX: 31.64 KG/M2

## 2019-07-01 DIAGNOSIS — R73.9 HYPERGLYCEMIA: ICD-10-CM

## 2019-07-01 DIAGNOSIS — G89.29 CHRONIC PAIN OF RIGHT KNEE: ICD-10-CM

## 2019-07-01 DIAGNOSIS — I10 ESSENTIAL HYPERTENSION: Primary | ICD-10-CM

## 2019-07-01 DIAGNOSIS — M25.561 CHRONIC PAIN OF RIGHT KNEE: ICD-10-CM

## 2019-07-01 DIAGNOSIS — E55.9 MILD VITAMIN D DEFICIENCY: ICD-10-CM

## 2019-07-01 LAB
ALBUMIN/CREAT UR: NORMAL UG/MG (ref 0–30)
BACTERIA #/AREA URNS AUTO: NORMAL /HPF
BILIRUB UR QL STRIP: NEGATIVE
CLARITY UR REFRACT.AUTO: CLEAR
COLOR UR AUTO: YELLOW
CREAT UR-MCNC: 90 MG/DL (ref 15–325)
GLUCOSE UR QL STRIP: NEGATIVE
HGB UR QL STRIP: NEGATIVE
KETONES UR QL STRIP: NEGATIVE
LEUKOCYTE ESTERASE UR QL STRIP: ABNORMAL
MICROALBUMIN UR DL<=1MG/L-MCNC: <2.5 UG/ML
MICROSCOPIC COMMENT: NORMAL
NITRITE UR QL STRIP: NEGATIVE
PH UR STRIP: 7 [PH] (ref 5–8)
PROT UR QL STRIP: NEGATIVE
RBC #/AREA URNS AUTO: 1 /HPF (ref 0–4)
SP GR UR STRIP: 1.01 (ref 1–1.03)
SQUAMOUS #/AREA URNS AUTO: 1 /HPF
URN SPEC COLLECT METH UR: ABNORMAL
WBC #/AREA URNS AUTO: 1 /HPF (ref 0–5)

## 2019-07-01 PROCEDURE — 73560 XR KNEE AP STANDING WITH BOTH LATERAL: ICD-10-PCS | Mod: 26,50,HCNC, | Performed by: INTERNAL MEDICINE

## 2019-07-01 PROCEDURE — 3078F PR MOST RECENT DIASTOLIC BLOOD PRESSURE < 80 MM HG: ICD-10-PCS | Mod: HCNC,CPTII,S$GLB, | Performed by: INTERNAL MEDICINE

## 2019-07-01 PROCEDURE — 1101F PR PT FALLS ASSESS DOC 0-1 FALLS W/OUT INJ PAST YR: ICD-10-PCS | Mod: HCNC,CPTII,S$GLB, | Performed by: INTERNAL MEDICINE

## 2019-07-01 PROCEDURE — 3078F DIAST BP <80 MM HG: CPT | Mod: HCNC,CPTII,S$GLB, | Performed by: INTERNAL MEDICINE

## 2019-07-01 PROCEDURE — 3075F SYST BP GE 130 - 139MM HG: CPT | Mod: HCNC,CPTII,S$GLB, | Performed by: INTERNAL MEDICINE

## 2019-07-01 PROCEDURE — 82043 UR ALBUMIN QUANTITATIVE: CPT | Mod: HCNC

## 2019-07-01 PROCEDURE — 73560 X-RAY EXAM OF KNEE 1 OR 2: CPT | Mod: 26,50,HCNC, | Performed by: INTERNAL MEDICINE

## 2019-07-01 PROCEDURE — 99214 PR OFFICE/OUTPT VISIT, EST, LEVL IV, 30-39 MIN: ICD-10-PCS | Mod: HCNC,S$GLB,, | Performed by: INTERNAL MEDICINE

## 2019-07-01 PROCEDURE — 81001 URINALYSIS AUTO W/SCOPE: CPT | Mod: HCNC

## 2019-07-01 PROCEDURE — 1101F PT FALLS ASSESS-DOCD LE1/YR: CPT | Mod: HCNC,CPTII,S$GLB, | Performed by: INTERNAL MEDICINE

## 2019-07-01 PROCEDURE — 73560 X-RAY EXAM OF KNEE 1 OR 2: CPT | Mod: TC,50,HCNC,FY,PO

## 2019-07-01 PROCEDURE — 99999 PR PBB SHADOW E&M-EST. PATIENT-LVL V: CPT | Mod: PBBFAC,HCNC,, | Performed by: INTERNAL MEDICINE

## 2019-07-01 PROCEDURE — 99999 PR PBB SHADOW E&M-EST. PATIENT-LVL V: ICD-10-PCS | Mod: PBBFAC,HCNC,, | Performed by: INTERNAL MEDICINE

## 2019-07-01 PROCEDURE — 3075F PR MOST RECENT SYSTOLIC BLOOD PRESS GE 130-139MM HG: ICD-10-PCS | Mod: HCNC,CPTII,S$GLB, | Performed by: INTERNAL MEDICINE

## 2019-07-01 PROCEDURE — 99214 OFFICE O/P EST MOD 30 MIN: CPT | Mod: HCNC,S$GLB,, | Performed by: INTERNAL MEDICINE

## 2019-07-02 ENCOUNTER — HOSPITAL ENCOUNTER (OUTPATIENT)
Dept: RADIOLOGY | Facility: HOSPITAL | Age: 79
Discharge: HOME | End: 2019-07-02
Attending: PHYSICIAN ASSISTANT
Payer: MEDICARE

## 2019-07-02 DIAGNOSIS — R52 PAIN: ICD-10-CM

## 2019-07-08 NOTE — PROGRESS NOTES
Subjective:       Patient ID: Kelley Cruz is a 79 y.o. female.    Chief Complaint: Follow-up    HPIPt fractured wrist to prevent a fall but is doing well otherwise.  No CP or SOB.  Pt with R knee pain.  Review of Systems   Respiratory: Negative for shortness of breath (PND or orthopnea).    Cardiovascular: Negative for chest pain (arm pain or jaw pain).   Gastrointestinal: Negative for abdominal pain, diarrhea, nausea and vomiting.   Genitourinary: Negative for dysuria.   Neurological: Negative for seizures, syncope and headaches.       Objective:      Physical Exam   Constitutional: She is oriented to person, place, and time. She appears well-developed and well-nourished. No distress.   HENT:   Head: Normocephalic.   Mouth/Throat: Oropharynx is clear and moist.   Neck: Neck supple. No JVD present. No thyromegaly present.   Cardiovascular: Normal rate, regular rhythm, normal heart sounds and intact distal pulses. Exam reveals no gallop and no friction rub.   No murmur heard.  Pulmonary/Chest: Effort normal and breath sounds normal. She has no wheezes. She has no rales.   Abdominal: Soft. Bowel sounds are normal. She exhibits no distension and no mass. There is no tenderness. There is no rebound and no guarding.   Musculoskeletal: She exhibits no edema.   R knee some effusion seen   Lymphadenopathy:     She has no cervical adenopathy.   Neurological: She is alert and oriented to person, place, and time. She has normal reflexes.   Skin: Skin is warm and dry.   Psychiatric: She has a normal mood and affect. Her behavior is normal. Judgment and thought content normal.       Assessment:       1. Essential hypertension    2. Chronic pain of right knee    3. Hyperglycemia    4. Mild vitamin D deficiency        Plan:   Essential hypertension  -     CBC auto differential; Future; Expected date: 07/01/2019  -     Comprehensive metabolic panel; Future; Expected date: 07/01/2019  -     Lipid panel; Future; Expected  date: 07/01/2019  -     TSH; Future; Expected date: 07/01/2019  -     Urinalysis  -     Microalbumin/creatinine urine ratio  Controlled - continue current meds    Chronic pain of right knee  -     X-ray AP Standing Knees with Both Latera; Future; Expected date: 07/01/2019  -     Ambulatory consult to Orthopedics    Hyperglycemia  -     Hemoglobin A1c; Future; Expected date: 07/01/2019    Mild vitamin D deficiency  -     Vitamin D; Future; Expected date: 07/01/2019    Other orders  -     Urinalysis Microscopic  PT declines therapy for osteoporosis

## 2019-08-20 ENCOUNTER — HOSPITAL ENCOUNTER (EMERGENCY)
Facility: OTHER | Age: 79
Discharge: HOME | End: 2019-08-20
Attending: EMERGENCY MEDICINE
Payer: MEDICARE

## 2019-08-20 VITALS
HEIGHT: 63 IN | TEMPERATURE: 97 F | OXYGEN SATURATION: 96 % | RESPIRATION RATE: 16 BRPM | SYSTOLIC BLOOD PRESSURE: 131 MMHG | HEART RATE: 65 BPM | DIASTOLIC BLOOD PRESSURE: 74 MMHG | WEIGHT: 175 LBS | BODY MASS INDEX: 31.01 KG/M2

## 2019-08-20 DIAGNOSIS — S80.11XA CONTUSION OF LEG, RIGHT, INITIAL ENCOUNTER: Primary | ICD-10-CM
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
## 2019-08-20 PROCEDURE — 99283 EMERGENCY DEPT VISIT LOW MDM: CPT | Mod: 25,HCNC

## 2019-08-20 PROCEDURE — 25000003 PHARM REV CODE 250: Mod: HCNC | Performed by: EMERGENCY MEDICINE

## 2019-08-20 RX ADMIN — BACITRACIN ZINC, NEOMYCIN SULFATE, AND POLYMYXIN B SULFATE 1 EACH: 400; 3.5; 5 OINTMENT TOPICAL at 07:08
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
## 2019-09-08 ENCOUNTER — OFFICE VISIT (OUTPATIENT)
Dept: URGENT CARE | Facility: CLINIC | Age: 79
End: 2019-09-08
Payer: MEDICARE

## 2019-09-08 VITALS
DIASTOLIC BLOOD PRESSURE: 69 MMHG | RESPIRATION RATE: 18 BRPM | BODY MASS INDEX: 31.02 KG/M2 | HEART RATE: 74 BPM | TEMPERATURE: 98 F | OXYGEN SATURATION: 100 % | SYSTOLIC BLOOD PRESSURE: 126 MMHG | WEIGHT: 175.06 LBS | HEIGHT: 63 IN

## 2019-09-08 DIAGNOSIS — S60.211A CONTUSION OF RIGHT WRIST, INITIAL ENCOUNTER: ICD-10-CM

## 2019-09-08 DIAGNOSIS — S00.83XA CONTUSION OF FACE, INITIAL ENCOUNTER: ICD-10-CM

## 2019-09-08 DIAGNOSIS — M62.838 MUSCLE SPASM: ICD-10-CM
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
## 2019-09-08 DIAGNOSIS — S00.531A CONTUSION OF LIP, INITIAL ENCOUNTER: ICD-10-CM

## 2019-09-08 PROCEDURE — 3074F PR MOST RECENT SYSTOLIC BLOOD PRESSURE < 130 MM HG: ICD-10-PCS | Mod: CPTII,S$GLB,, | Performed by: NURSE PRACTITIONER

## 2019-09-08 PROCEDURE — 3078F PR MOST RECENT DIASTOLIC BLOOD PRESSURE < 80 MM HG: ICD-10-PCS | Mod: CPTII,S$GLB,, | Performed by: NURSE PRACTITIONER

## 2019-09-08 PROCEDURE — 99204 OFFICE O/P NEW MOD 45 MIN: CPT | Mod: S$GLB,,, | Performed by: NURSE PRACTITIONER

## 2019-09-08 PROCEDURE — 70260 X-RAY EXAM OF SKULL: CPT | Mod: S$GLB,,, | Performed by: RADIOLOGY

## 2019-09-08 PROCEDURE — 3078F DIAST BP <80 MM HG: CPT | Mod: CPTII,S$GLB,, | Performed by: NURSE PRACTITIONER

## 2019-09-08 PROCEDURE — 73110 X-RAY EXAM OF WRIST: CPT | Mod: RT,S$GLB,, | Performed by: RADIOLOGY

## 2019-09-08 PROCEDURE — 73110 XR WRIST COMPLETE 3 VIEWS RIGHT: ICD-10-PCS | Mod: RT,S$GLB,, | Performed by: RADIOLOGY

## 2019-09-08 PROCEDURE — 3074F SYST BP LT 130 MM HG: CPT | Mod: CPTII,S$GLB,, | Performed by: NURSE PRACTITIONER

## 2019-09-08 PROCEDURE — 70260 XR SKULL COMPLETE MIN 4 VIEWS: ICD-10-PCS | Mod: S$GLB,,, | Performed by: RADIOLOGY

## 2019-09-08 PROCEDURE — 99204 PR OFFICE/OUTPT VISIT, NEW, LEVL IV, 45-59 MIN: ICD-10-PCS | Mod: S$GLB,,, | Performed by: NURSE PRACTITIONER
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
## 2019-09-08 NOTE — PROGRESS NOTES
Pt c/o fall falling outside of [facility] yesterday evening patient had her right shoulder and arm the right side of her face patient did not lose consciousness.  Patient has right wrist pain, lip pain and swelling, facial pain slowly and a mild headache.  Denies nausea and vomiting.  Denies irritability and mood.  No visual changes.  Patient took ibuprofen last night for pain and swelling. Patient had previous injury to right wrist in January.  Number neurological deficits noted.

## 2019-09-08 NOTE — PATIENT INSTRUCTIONS
RETURN TO CLINIC IF SYMPTOMS WORSEN OR CALL 911 IMMEDIATELY FOR SHORTNESS OF BREATH, CHEST PAIN, DIZZINESS, WORSENING PAIN, NAUSEA AND VOMITING, HEART PALPITATIONS, FEVER AND/OR NECK STIFFNESS. FOLLOW UP WITH PRIMARY CARE PROVIDER IN THE AM.    If you were prescribed a narcotic or controlled medication, do not drive or operate heavy equipment or machinery while taking these medications.  You must understand that you've received an Urgent Care treatment only and that you may be released before all your medical problems are known or treated. You, the patient, will arrange for follow up care as instructed.  Follow up with your PCP or specialty clinic as directed in the next 1-2 weeks if not improved or as needed.  You can call [phone] to schedule an appointment with the appropriate provider.  If your condition worsens we recommend that you receive another evaluation at the emergency room immediately or contact your primary medical clinics after hours call service to discuss your concerns.  Please return here or go to the Emergency Department for any concerns or worsening of condition.    TAKE TYLENOL EVERY 4 HOURS AS NEEDED FOR PAIN.

## 2019-09-09 ENCOUNTER — OFFICE VISIT (OUTPATIENT)
Dept: INTERNAL MEDICINE | Facility: CLINIC | Age: 79
End: 2019-09-09
Payer: MEDICARE

## 2019-09-09 VITALS
OXYGEN SATURATION: 97 % | DIASTOLIC BLOOD PRESSURE: 74 MMHG | HEIGHT: 63 IN | SYSTOLIC BLOOD PRESSURE: 126 MMHG | HEART RATE: 66 BPM | TEMPERATURE: 99 F | BODY MASS INDEX: 32.03 KG/M2 | WEIGHT: 180.75 LBS

## 2019-09-09 DIAGNOSIS — M25.561 RIGHT KNEE PAIN, UNSPECIFIED CHRONICITY: ICD-10-CM

## 2019-09-09 DIAGNOSIS — S09.90XD INJURY OF HEAD, SUBSEQUENT ENCOUNTER: Primary | ICD-10-CM

## 2019-09-09 PROCEDURE — 1100F PR PT FALLS ASSESS DOC 2+ FALLS/FALL W/INJURY/YR: ICD-10-PCS | Mod: HCNC,CPTII,S$GLB, | Performed by: INTERNAL MEDICINE

## 2019-09-09 PROCEDURE — 99213 PR OFFICE/OUTPT VISIT, EST, LEVL III, 20-29 MIN: ICD-10-PCS | Mod: HCNC,S$GLB,, | Performed by: INTERNAL MEDICINE

## 2019-09-09 PROCEDURE — 3078F PR MOST RECENT DIASTOLIC BLOOD PRESSURE < 80 MM HG: ICD-10-PCS | Mod: HCNC,CPTII,S$GLB, | Performed by: INTERNAL MEDICINE

## 2019-09-09 PROCEDURE — 1100F PTFALLS ASSESS-DOCD GE2>/YR: CPT | Mod: HCNC,CPTII,S$GLB, | Performed by: INTERNAL MEDICINE

## 2019-09-09 PROCEDURE — 3288F FALL RISK ASSESSMENT DOCD: CPT | Mod: HCNC,CPTII,S$GLB, | Performed by: INTERNAL MEDICINE

## 2019-09-09 PROCEDURE — 3074F SYST BP LT 130 MM HG: CPT | Mod: HCNC,CPTII,S$GLB, | Performed by: INTERNAL MEDICINE

## 2019-09-09 PROCEDURE — 99213 OFFICE O/P EST LOW 20 MIN: CPT | Mod: HCNC,S$GLB,, | Performed by: INTERNAL MEDICINE

## 2019-09-09 PROCEDURE — 99999 PR PBB SHADOW E&M-EST. PATIENT-LVL V: ICD-10-PCS | Mod: PBBFAC,HCNC,, | Performed by: INTERNAL MEDICINE

## 2019-09-09 PROCEDURE — 3078F DIAST BP <80 MM HG: CPT | Mod: HCNC,CPTII,S$GLB, | Performed by: INTERNAL MEDICINE

## 2019-09-09 PROCEDURE — 99999 PR PBB SHADOW E&M-EST. PATIENT-LVL V: CPT | Mod: PBBFAC,HCNC,, | Performed by: INTERNAL MEDICINE

## 2019-09-09 PROCEDURE — 3288F PR FALLS RISK ASSESSMENT DOCUMENTED: ICD-10-PCS | Mod: HCNC,CPTII,S$GLB, | Performed by: INTERNAL MEDICINE

## 2019-09-09 PROCEDURE — 3074F PR MOST RECENT SYSTOLIC BLOOD PRESSURE < 130 MM HG: ICD-10-PCS | Mod: HCNC,CPTII,S$GLB, | Performed by: INTERNAL MEDICINE

## 2019-09-09 RX ORDER — CEPHALEXIN 500 MG/1
500 CAPSULE ORAL EVERY 8 HOURS
Qty: 30 CAPSULE | Refills: 0 | Status: SHIPPED | OUTPATIENT
Start: 2019-09-09 | End: 2021-01-05

## 2019-09-09 RX ORDER — MELOXICAM 7.5 MG/1
TABLET ORAL
Qty: 30 TABLET | Refills: 0 | Status: SHIPPED | OUTPATIENT
Start: 2019-09-09 | End: 2021-01-05

## 2019-09-10 ENCOUNTER — HOSPITAL ENCOUNTER (OUTPATIENT)
Dept: RADIOLOGY | Facility: HOSPITAL | Age: 79
Discharge: HOME | End: 2019-09-10
Attending: INTERNAL MEDICINE
Payer: MEDICARE

## 2019-09-10 DIAGNOSIS — S09.90XD INJURY OF HEAD, SUBSEQUENT ENCOUNTER: ICD-10-CM

## 2019-09-10 DIAGNOSIS — M25.561 RIGHT KNEE PAIN, UNSPECIFIED CHRONICITY: ICD-10-CM

## 2019-09-10 PROCEDURE — 70486 CT MAXILLOFACIAL W/O DYE: CPT | Mod: TC,HCNC

## 2019-09-10 PROCEDURE — 73562 X-RAY EXAM OF KNEE 3: CPT | Mod: 26,HCNC,XS,LT | Performed by: RADIOLOGY

## 2019-09-10 PROCEDURE — 73564 X-RAY EXAM KNEE 4 OR MORE: CPT | Mod: 26,HCNC,RT, | Performed by: RADIOLOGY

## 2019-09-10 PROCEDURE — 70450 CT HEAD/BRAIN W/O DYE: CPT | Mod: 26,HCNC,, | Performed by: RADIOLOGY

## 2019-09-10 PROCEDURE — 73562 X-RAY EXAM OF KNEE 3: CPT | Mod: TC,HCNC,LT

## 2019-09-10 PROCEDURE — 73564 XR KNEE ORTHO RIGHT WITH FLEXION: ICD-10-PCS | Mod: 26,HCNC,RT, | Performed by: RADIOLOGY

## 2019-09-10 PROCEDURE — 73562 XR KNEE ORTHO RIGHT WITH FLEXION: ICD-10-PCS | Mod: 26,HCNC,XS,LT | Performed by: RADIOLOGY

## 2019-09-10 PROCEDURE — 70486 CT MAXILLOFACIAL W/O DYE: CPT | Mod: 26,HCNC,, | Performed by: RADIOLOGY

## 2019-09-10 PROCEDURE — 70450 CT HEAD/BRAIN W/O DYE: CPT | Mod: TC,HCNC

## 2019-09-10 PROCEDURE — 70486 CT MAXILLOFACIAL WITHOUT CONTRAST: ICD-10-PCS | Mod: 26,HCNC,, | Performed by: RADIOLOGY

## 2019-09-10 PROCEDURE — 70450 CT HEAD WITHOUT CONTRAST: ICD-10-PCS | Mod: 26,HCNC,, | Performed by: RADIOLOGY
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
## 2019-09-17 NOTE — TELEPHONE ENCOUNTER
pcp placed referral for PT for right leg. [facility] was not able to see patient until October. patient calling around to find outside Pt. She would like to go to [facility]. I let her know the CT results are normal

## 2019-09-17 NOTE — TELEPHONE ENCOUNTER
----- Message from Jennifer Madrid sent at 9/17/2019 12:33 PM CDT -----  Contact: sef/[phone]  Pt called in regards to talking to the dr about the referral that the dr gave her.       Please advise

## 2019-09-18 NOTE — TELEPHONE ENCOUNTER
----- Message from Yina Roth MD sent at 9/15/2019  9:08 PM CDT -----  Please inform CT head is normal and CT facial bones show no fracture.

## 2019-09-19 NOTE — TELEPHONE ENCOUNTER
----- Message from Jennifer Madrid sent at 9/19/2019 10:49 AM CDT -----  Contact: pharmacy/marie/[phone]  Pharmacy called in regards to getting a Rx refill for     losartan (COZAAR) 25 MG tablet 90 tablet 1 2/27/2019  No  Take 1 tablet (25 mg total) by mouth once daily    [facility]  Please advise

## 2019-09-20 RX ORDER — LOSARTAN POTASSIUM 25 MG/1
TABLET ORAL
Qty: 90 TABLET | Refills: 1 | Status: SHIPPED | OUTPATIENT
Start: 2019-09-20 | End: 2020-03-23 | Stop reason: SDUPTHER

## 2019-09-20 RX ORDER — TRIAMTERENE AND HYDROCHLOROTHIAZIDE 37.5; 25 MG/1; MG/1
1 CAPSULE ORAL DAILY
Qty: 90 CAPSULE | Refills: 2 | Status: SHIPPED | OUTPATIENT
Start: 2019-09-20 | End: 2020-08-31 | Stop reason: SDUPTHER

## 2019-09-20 RX ORDER — LOSARTAN POTASSIUM 25 MG/1
25 TABLET ORAL DAILY
Qty: 90 TABLET | Refills: 1 | OUTPATIENT
Start: 2019-09-20

## 2019-09-20 NOTE — TELEPHONE ENCOUNTER
----- Message from Germaine Gu sent at 9/20/2019 10:14 AM CDT -----  Contact: [phone]  Patient is returning a phone call.  Who left a message for the patient:   Does patient know what this is regarding:    Comments: please advise, thanks .

## 2019-10-09 ENCOUNTER — TELEPHONE (OUTPATIENT)
Dept: INTERNAL MEDICINE | Facility: CLINIC | Age: 79
End: 2019-10-09

## 2019-10-09 RX ORDER — BENZONATATE 100 MG/1
100 CAPSULE ORAL 3 TIMES DAILY PRN
Qty: 30 CAPSULE | Refills: 0 | Status: SHIPPED | OUTPATIENT
Start: 2019-10-09 | End: 2019-10-19

## 2019-10-09 RX ORDER — AZITHROMYCIN 500 MG/1
500 TABLET, FILM COATED ORAL DAILY
Qty: 10 TABLET | Refills: 0 | Status: SHIPPED | OUTPATIENT
Start: 2019-10-09 | End: 2019-10-19

## 2019-10-09 NOTE — TELEPHONE ENCOUNTER
Pt denies fever, chills, sweats and SOB.  Zithromax and tessalon sent to pharm.  Appt if no improvement in 48 hours.

## 2019-10-09 NOTE — TELEPHONE ENCOUNTER
----- Message from Marlyn Harvey sent at 10/9/2019  8:50 AM CDT -----  Contact: self/[phone]  Pt states that she has coughing, chest congestion, right ear aching, and also has some stuff coming from her eyes. Pt would like a Z-SHIRA to be sent to [id number] [place], [place] - [address]. Please call and advise.        Thank You

## 2019-10-09 NOTE — TELEPHONE ENCOUNTER
----- Message from Alissa Smalls sent at 10/9/2019  1:28 PM CDT -----  Contact: Pt self Home [phone]   Patient is calling in regards to her having a possible cold that she said she's been having for about a week or longer. She said that she's having symptoms of coughing, sneezing, cold in eyes and her right ear aches. She would like for you to write her a script for a Z-pack and have it sent to..    Patient's Pharmacy: [id number] - [place], [place] - [address]  Phone# [phone], Fax# [phone]

## 2019-10-21 ENCOUNTER — IMMUNIZATION (OUTPATIENT)
Dept: INTERNAL MEDICINE | Facility: CLINIC | Age: 79
End: 2019-10-21
Payer: MEDICARE

## 2019-10-21 ENCOUNTER — TELEPHONE (OUTPATIENT)
Dept: INTERNAL MEDICINE | Facility: CLINIC | Age: 79
End: 2019-10-21

## 2019-10-21 ENCOUNTER — HOSPITAL ENCOUNTER (OUTPATIENT)
Dept: RADIOLOGY | Facility: HOSPITAL | Age: 79
Discharge: HOME | End: 2019-10-21
Attending: INTERNAL MEDICINE
Payer: MEDICARE

## 2019-10-21 ENCOUNTER — OFFICE VISIT (OUTPATIENT)
Dept: INTERNAL MEDICINE | Facility: CLINIC | Age: 79
End: 2019-10-21
Payer: MEDICARE

## 2019-10-21 VITALS
TEMPERATURE: 98 F | BODY MASS INDEX: 32.03 KG/M2 | DIASTOLIC BLOOD PRESSURE: 76 MMHG | WEIGHT: 180.75 LBS | SYSTOLIC BLOOD PRESSURE: 140 MMHG | HEIGHT: 63 IN | OXYGEN SATURATION: 96 % | HEART RATE: 63 BPM

## 2019-10-21 DIAGNOSIS — R60.9 EDEMA, UNSPECIFIED TYPE: ICD-10-CM

## 2019-10-21 DIAGNOSIS — M25.561 RIGHT KNEE PAIN, UNSPECIFIED CHRONICITY: ICD-10-CM

## 2019-10-21 DIAGNOSIS — H92.01 RIGHT EAR PAIN: ICD-10-CM

## 2019-10-21 DIAGNOSIS — R60.9 EDEMA, UNSPECIFIED TYPE: Primary | ICD-10-CM

## 2019-10-21 PROCEDURE — 99999 PR PBB SHADOW E&M-EST. PATIENT-LVL V: ICD-10-PCS | Mod: PBBFAC,HCNC,, | Performed by: INTERNAL MEDICINE

## 2019-10-21 PROCEDURE — 3077F SYST BP >= 140 MM HG: CPT | Mod: HCNC,CPTII,S$GLB, | Performed by: INTERNAL MEDICINE

## 2019-10-21 PROCEDURE — 93970 EXTREMITY STUDY: CPT | Mod: 26,HCNC,, | Performed by: INTERNAL MEDICINE

## 2019-10-21 PROCEDURE — 3077F PR MOST RECENT SYSTOLIC BLOOD PRESSURE >= 140 MM HG: ICD-10-PCS | Mod: HCNC,CPTII,S$GLB, | Performed by: INTERNAL MEDICINE

## 2019-10-21 PROCEDURE — 99999 PR PBB SHADOW E&M-EST. PATIENT-LVL V: CPT | Mod: PBBFAC,HCNC,, | Performed by: INTERNAL MEDICINE

## 2019-10-21 PROCEDURE — 93970 EXTREMITY STUDY: CPT | Mod: TC,HCNC

## 2019-10-21 PROCEDURE — G0008 FLU VACCINE - HIGH DOSE (65+) PRESERVATIVE FREE IM: ICD-10-PCS | Mod: HCNC,S$GLB,, | Performed by: INTERNAL MEDICINE

## 2019-10-21 PROCEDURE — 1101F PT FALLS ASSESS-DOCD LE1/YR: CPT | Mod: HCNC,CPTII,S$GLB, | Performed by: INTERNAL MEDICINE

## 2019-10-21 PROCEDURE — 3078F PR MOST RECENT DIASTOLIC BLOOD PRESSURE < 80 MM HG: ICD-10-PCS | Mod: HCNC,CPTII,S$GLB, | Performed by: INTERNAL MEDICINE

## 2019-10-21 PROCEDURE — 1101F PR PT FALLS ASSESS DOC 0-1 FALLS W/OUT INJ PAST YR: ICD-10-PCS | Mod: HCNC,CPTII,S$GLB, | Performed by: INTERNAL MEDICINE

## 2019-10-21 PROCEDURE — 90662 IIV NO PRSV INCREASED AG IM: CPT | Mod: HCNC,S$GLB,, | Performed by: INTERNAL MEDICINE

## 2019-10-21 PROCEDURE — 96372 THER/PROPH/DIAG INJ SC/IM: CPT | Mod: HCNC,S$GLB,, | Performed by: INTERNAL MEDICINE

## 2019-10-21 PROCEDURE — 99213 OFFICE O/P EST LOW 20 MIN: CPT | Mod: 25,HCNC,S$GLB, | Performed by: INTERNAL MEDICINE

## 2019-10-21 PROCEDURE — 99213 PR OFFICE/OUTPT VISIT, EST, LEVL III, 20-29 MIN: ICD-10-PCS | Mod: 25,HCNC,S$GLB, | Performed by: INTERNAL MEDICINE

## 2019-10-21 PROCEDURE — 3078F DIAST BP <80 MM HG: CPT | Mod: HCNC,CPTII,S$GLB, | Performed by: INTERNAL MEDICINE

## 2019-10-21 PROCEDURE — 96372 PR INJECTION,THERAP/PROPH/DIAG2ST, IM OR SUBCUT: ICD-10-PCS | Mod: HCNC,S$GLB,, | Performed by: INTERNAL MEDICINE

## 2019-10-21 PROCEDURE — 93970 US LOWER EXTREMITY VEINS BILATERAL: ICD-10-PCS | Mod: 26,HCNC,, | Performed by: INTERNAL MEDICINE

## 2019-10-21 PROCEDURE — 90662 FLU VACCINE - HIGH DOSE (65+) PRESERVATIVE FREE IM: ICD-10-PCS | Mod: HCNC,S$GLB,, | Performed by: INTERNAL MEDICINE

## 2019-10-21 PROCEDURE — G0008 ADMIN INFLUENZA VIRUS VAC: HCPCS | Mod: HCNC,S$GLB,, | Performed by: INTERNAL MEDICINE

## 2019-10-21 RX ORDER — CEFUROXIME AXETIL 500 MG/1
500 TABLET ORAL 2 TIMES DAILY
Qty: 14 TABLET | Refills: 0 | Status: SHIPPED | OUTPATIENT
Start: 2019-10-21 | End: 2020-08-31

## 2019-10-21 RX ORDER — TRIAMCINOLONE ACETONIDE 40 MG/ML
40 INJECTION, SUSPENSION INTRA-ARTICULAR; INTRAMUSCULAR
Status: COMPLETED | OUTPATIENT
Start: 2019-10-21 | End: 2019-10-21

## 2019-10-21 RX ADMIN — TRIAMCINOLONE ACETONIDE 40 MG: 40 INJECTION, SUSPENSION INTRA-ARTICULAR; INTRAMUSCULAR at 01:10

## 2019-10-21 NOTE — TELEPHONE ENCOUNTER
----- Message from Kyle Matamoros sent at 10/21/2019  4:29 PM CDT -----  Contact: self   Patient is returning a phone call.  Who left a message for the patient: Soledad Maciel LPN   Does patient know what this is regarding:  results  Comments:

## 2019-10-21 NOTE — TELEPHONE ENCOUNTER
----- Message from Yina Roth MD sent at 10/21/2019  3:41 PM CDT -----  Please inform no blood clots are seen.

## 2019-10-22 ENCOUNTER — HOSPITAL ENCOUNTER (OUTPATIENT)
Dept: RADIOLOGY | Facility: HOSPITAL | Age: 79
Discharge: HOME | End: 2019-10-22
Attending: ORTHOPAEDIC SURGERY
Payer: MEDICARE

## 2019-10-22 ENCOUNTER — OFFICE VISIT (OUTPATIENT)
Dept: SPORTS MEDICINE | Facility: CLINIC | Age: 79
End: 2019-10-22
Payer: MEDICARE

## 2019-10-22 VITALS
HEART RATE: 70 BPM | BODY MASS INDEX: 31.89 KG/M2 | WEIGHT: 180 LBS | HEIGHT: 63 IN | DIASTOLIC BLOOD PRESSURE: 77 MMHG | SYSTOLIC BLOOD PRESSURE: 144 MMHG

## 2019-10-22 DIAGNOSIS — M25.561 RIGHT KNEE PAIN, UNSPECIFIED CHRONICITY: Primary | ICD-10-CM

## 2019-10-22 DIAGNOSIS — M25.561 RIGHT KNEE PAIN, UNSPECIFIED CHRONICITY: ICD-10-CM

## 2019-10-22 PROCEDURE — 3077F SYST BP >= 140 MM HG: CPT | Mod: HCNC,CPTII,S$GLB, | Performed by: ORTHOPAEDIC SURGERY

## 2019-10-22 PROCEDURE — 99999 PR PBB SHADOW E&M-EST. PATIENT-LVL III: CPT | Mod: PBBFAC,HCNC,, | Performed by: ORTHOPAEDIC SURGERY

## 2019-10-22 PROCEDURE — 1100F PTFALLS ASSESS-DOCD GE2>/YR: CPT | Mod: HCNC,CPTII,S$GLB, | Performed by: ORTHOPAEDIC SURGERY

## 2019-10-22 PROCEDURE — 1100F PR PT FALLS ASSESS DOC 2+ FALLS/FALL W/INJURY/YR: ICD-10-PCS | Mod: HCNC,CPTII,S$GLB, | Performed by: ORTHOPAEDIC SURGERY

## 2019-10-22 PROCEDURE — 3078F DIAST BP <80 MM HG: CPT | Mod: HCNC,CPTII,S$GLB, | Performed by: ORTHOPAEDIC SURGERY

## 2019-10-22 PROCEDURE — 3077F PR MOST RECENT SYSTOLIC BLOOD PRESSURE >= 140 MM HG: ICD-10-PCS | Mod: HCNC,CPTII,S$GLB, | Performed by: ORTHOPAEDIC SURGERY

## 2019-10-22 PROCEDURE — 3288F PR FALLS RISK ASSESSMENT DOCUMENTED: ICD-10-PCS | Mod: HCNC,CPTII,S$GLB, | Performed by: ORTHOPAEDIC SURGERY

## 2019-10-22 PROCEDURE — 3288F FALL RISK ASSESSMENT DOCD: CPT | Mod: HCNC,CPTII,S$GLB, | Performed by: ORTHOPAEDIC SURGERY

## 2019-10-22 PROCEDURE — 99999 PR PBB SHADOW E&M-EST. PATIENT-LVL III: ICD-10-PCS | Mod: PBBFAC,HCNC,, | Performed by: ORTHOPAEDIC SURGERY

## 2019-10-22 PROCEDURE — 3078F PR MOST RECENT DIASTOLIC BLOOD PRESSURE < 80 MM HG: ICD-10-PCS | Mod: HCNC,CPTII,S$GLB, | Performed by: ORTHOPAEDIC SURGERY

## 2019-10-22 PROCEDURE — 99203 PR OFFICE/OUTPT VISIT, NEW, LEVL III, 30-44 MIN: ICD-10-PCS | Mod: HCNC,S$GLB,, | Performed by: ORTHOPAEDIC SURGERY

## 2019-10-22 PROCEDURE — 99203 OFFICE O/P NEW LOW 30 MIN: CPT | Mod: HCNC,S$GLB,, | Performed by: ORTHOPAEDIC SURGERY
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
## 2019-10-22 NOTE — LETTER
October 28, 2019      Yina Roth MD  [address]  [facility] [address]           [facility]  [address]  [facility] [address]  Phone: [phone]          Patient: Kelley Cruz   MR Number: [id number]   YOB: 1940   Date of Visit: 10/22/2019       Dear Dr. Yina Roth:    Thank you for referring Kelley Cruz to me for evaluation. Attached you will find relevant portions of my assessment and plan of care.    If you have questions, please do not hesitate to call me. I look forward to following Kelley Cruz along with you.    Sincerely,    W Parish Brewster MD    Enclosure  CC:  No Recipients    If you would like to receive this communication electronically, please contact [email or web address] or [phone] to request more information on Near Page Link access.    For providers and/or their staff who would like to refer a patient to [facility], please contact us through our one-stop-shop provider referral line, [facility] [place], at [phone].    If you feel you have received this communication in error or would no longer like to receive these types of communications, please e-mail [email or web address]

## 2019-10-23 ENCOUNTER — HOSPITAL ENCOUNTER (OUTPATIENT)
Dept: RADIOLOGY | Facility: HOSPITAL | Age: 79
Discharge: HOME | End: 2019-10-23
Attending: ORTHOPAEDIC SURGERY
Payer: MEDICARE

## 2019-10-23 DIAGNOSIS — M25.561 RIGHT KNEE PAIN, UNSPECIFIED CHRONICITY: ICD-10-CM

## 2019-10-23 PROCEDURE — 73721 MRI KNEE WITHOUT CONTRAST RIGHT: ICD-10-PCS | Mod: 26,HCNC,RT, | Performed by: RADIOLOGY

## 2019-10-23 PROCEDURE — 73721 MRI JNT OF LWR EXTRE W/O DYE: CPT | Mod: 26,HCNC,RT, | Performed by: RADIOLOGY

## 2019-10-23 PROCEDURE — 73721 MRI JNT OF LWR EXTRE W/O DYE: CPT | Mod: TC,HCNC,RT

## 2019-10-24 ENCOUNTER — TELEPHONE (OUTPATIENT)
Dept: SPORTS MEDICINE | Facility: CLINIC | Age: 79
End: 2019-10-24

## 2019-10-24 NOTE — TELEPHONE ENCOUNTER
----- Message from Chloe Marlow PA-C sent at 10/24/2019  2:21 PM CDT -----  Contact: pt      ----- Message -----  From: Zeinab Freeman  Sent: 10/24/2019   2:03 PM CDT  To: [facility] Staff    Pt stated she's in pain and would like to know if she can be seen sooner    [phone] ([place])

## 2019-10-25 ENCOUNTER — OFFICE VISIT (OUTPATIENT)
Dept: SPORTS MEDICINE | Facility: CLINIC | Age: 79
End: 2019-10-25
Payer: MEDICARE

## 2019-10-25 VITALS
WEIGHT: 180 LBS | BODY MASS INDEX: 31.89 KG/M2 | HEART RATE: 65 BPM | SYSTOLIC BLOOD PRESSURE: 144 MMHG | DIASTOLIC BLOOD PRESSURE: 72 MMHG | HEIGHT: 63 IN

## 2019-10-25 DIAGNOSIS — M94.261 CHONDROMALACIA OF RIGHT KNEE: ICD-10-CM

## 2019-10-25 DIAGNOSIS — M25.461 EFFUSION OF RIGHT KNEE: ICD-10-CM

## 2019-10-25 DIAGNOSIS — S83.411D SPRAIN OF MEDIAL COLLATERAL LIGAMENT OF RIGHT KNEE, SUBSEQUENT ENCOUNTER: ICD-10-CM

## 2019-10-25 DIAGNOSIS — S83.241D ACUTE MEDIAL MENISCUS TEAR OF RIGHT KNEE, SUBSEQUENT ENCOUNTER: Primary | ICD-10-CM

## 2019-10-25 PROCEDURE — 99214 OFFICE O/P EST MOD 30 MIN: CPT | Mod: 25,HCNC,S$GLB, | Performed by: PHYSICIAN ASSISTANT

## 2019-10-25 PROCEDURE — 99999 PR PBB SHADOW E&M-EST. PATIENT-LVL III: CPT | Mod: PBBFAC,HCNC,, | Performed by: PHYSICIAN ASSISTANT

## 2019-10-25 PROCEDURE — 20610 PR DRAIN/INJECT LARGE JOINT/BURSA: ICD-10-PCS | Mod: HCNC,RT,S$GLB, | Performed by: PHYSICIAN ASSISTANT

## 2019-10-25 PROCEDURE — 3077F SYST BP >= 140 MM HG: CPT | Mod: HCNC,CPTII,S$GLB, | Performed by: PHYSICIAN ASSISTANT

## 2019-10-25 PROCEDURE — 99214 PR OFFICE/OUTPT VISIT, EST, LEVL IV, 30-39 MIN: ICD-10-PCS | Mod: 25,HCNC,S$GLB, | Performed by: PHYSICIAN ASSISTANT

## 2019-10-25 PROCEDURE — 99999 PR PBB SHADOW E&M-EST. PATIENT-LVL III: ICD-10-PCS | Mod: PBBFAC,HCNC,, | Performed by: PHYSICIAN ASSISTANT

## 2019-10-25 PROCEDURE — 3077F PR MOST RECENT SYSTOLIC BLOOD PRESSURE >= 140 MM HG: ICD-10-PCS | Mod: HCNC,CPTII,S$GLB, | Performed by: PHYSICIAN ASSISTANT

## 2019-10-25 PROCEDURE — 20610 DRAIN/INJ JOINT/BURSA W/O US: CPT | Mod: HCNC,RT,S$GLB, | Performed by: PHYSICIAN ASSISTANT

## 2019-10-25 PROCEDURE — 3078F PR MOST RECENT DIASTOLIC BLOOD PRESSURE < 80 MM HG: ICD-10-PCS | Mod: HCNC,CPTII,S$GLB, | Performed by: PHYSICIAN ASSISTANT

## 2019-10-25 PROCEDURE — 1101F PT FALLS ASSESS-DOCD LE1/YR: CPT | Mod: HCNC,CPTII,S$GLB, | Performed by: PHYSICIAN ASSISTANT

## 2019-10-25 PROCEDURE — 3078F DIAST BP <80 MM HG: CPT | Mod: HCNC,CPTII,S$GLB, | Performed by: PHYSICIAN ASSISTANT

## 2019-10-25 PROCEDURE — 1101F PR PT FALLS ASSESS DOC 0-1 FALLS W/OUT INJ PAST YR: ICD-10-PCS | Mod: HCNC,CPTII,S$GLB, | Performed by: PHYSICIAN ASSISTANT

## 2019-10-25 RX ORDER — BUPIVACAINE HYDROCHLORIDE 2.5 MG/ML
3 INJECTION, SOLUTION INFILTRATION; PERINEURAL
Status: COMPLETED | OUTPATIENT
Start: 2019-10-25 | End: 2019-10-25

## 2019-10-25 RX ORDER — TRIAMCINOLONE ACETONIDE 40 MG/ML
40 INJECTION, SUSPENSION INTRA-ARTICULAR; INTRAMUSCULAR
Status: COMPLETED | OUTPATIENT
Start: 2019-10-25 | End: 2019-10-25

## 2019-10-25 RX ADMIN — BUPIVACAINE HYDROCHLORIDE 7.5 MG: 2.5 INJECTION, SOLUTION INFILTRATION; PERINEURAL at 05:10

## 2019-10-25 RX ADMIN — TRIAMCINOLONE ACETONIDE 40 MG: 40 INJECTION, SUSPENSION INTRA-ARTICULAR; INTRAMUSCULAR at 05:10
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
## 2019-10-27 NOTE — PROGRESS NOTES
Subjective:       Patient ID: Kelley Cruz is a 79 y.o. female.    Chief Complaint: Follow-up (right ear still feels clogged, ach in right leg)    HPIPt with persistent R ear pain.  R knee still painful and R leg is swollen.  No CP or SOB.  Review of Systems   Respiratory: Negative for shortness of breath (PND or orthopnea).    Cardiovascular: Negative for chest pain (arm pain or jaw pain).   Gastrointestinal: Negative for abdominal pain, diarrhea, nausea and vomiting.   Genitourinary: Negative for dysuria.   Neurological: Negative for seizures, syncope and headaches.       Objective:      Physical Exam   Constitutional: She is oriented to person, place, and time. She appears well-developed and well-nourished. No distress.   HENT:   Head: Normocephalic.   Mouth/Throat: Oropharynx is clear and moist.   Neck: Neck supple. No JVD present. No thyromegaly present.   Cardiovascular: Normal rate, regular rhythm, normal heart sounds and intact distal pulses. Exam reveals no gallop and no friction rub.   No murmur heard.  Pulmonary/Chest: Effort normal and breath sounds normal. She has no wheezes. She has no rales.   Abdominal: Soft. Bowel sounds are normal. She exhibits no distension and no mass. There is no tenderness. There is no rebound and no guarding.   Musculoskeletal: She exhibits no edema.   Lymphadenopathy:     She has no cervical adenopathy.   Neurological: She is alert and oriented to person, place, and time. She has normal reflexes.   Skin: Skin is warm and dry.   Psychiatric: She has a normal mood and affect. Her behavior is normal. Judgment and thought content normal.       Assessment:       1. Edema, unspecified type    2. Right knee pain, unspecified chronicity    3. Right ear pain        Plan:   Edema, unspecified type  -     US Lower Extremity Veins Bilateral; Future; Expected date: 10/21/2019    Right knee pain, unspecified chronicity  -     Ambulatory referral/consult to Sports Medicine;  Future; Expected date: 10/21/2019    Right ear pain  -     Ambulatory consult to ENT    Other orders  -     cefUROXime (CEFTIN) 500 MG tablet; Take 1 tablet (500 mg total) by mouth 2 (two) times daily.  Dispense: 14 tablet; Refill: 0 - trt otitis  -     triamcinolone acetonide injection 40 mg

## 2019-10-28 ENCOUNTER — TELEPHONE (OUTPATIENT)
Dept: SPORTS MEDICINE | Facility: CLINIC | Age: 79
End: 2019-10-28

## 2019-10-28 NOTE — TELEPHONE ENCOUNTER
I called and left a voice mail with the patient to discuss MRI results.  No evidence of stress reaction or fracture on MRI.  The patient is a candidate for pes and possible intra-articular steroid injections for pain relief.  We will reach back out to her tomorrow to get this scheduled.    MRI right knee:  1. Complex medial meniscus tear with displaced flap.  2. Grade 1 sprain of MCL.  3. Tricompartmental cartilage loss.  4. Tendinosis of semimembranosus.  5. Small joint effusion with small Baker's cyst.

## 2019-10-29 ENCOUNTER — TELEPHONE (OUTPATIENT)
Dept: SPORTS MEDICINE | Facility: CLINIC | Age: 79
End: 2019-10-29

## 2019-10-29 ENCOUNTER — CLINICAL SUPPORT (OUTPATIENT)
Dept: AUDIOLOGY | Facility: CLINIC | Age: 79
End: 2019-10-29
Payer: MEDICARE

## 2019-10-29 ENCOUNTER — OFFICE VISIT (OUTPATIENT)
Dept: OTOLARYNGOLOGY | Facility: CLINIC | Age: 79
End: 2019-10-29
Payer: MEDICARE

## 2019-10-29 VITALS
WEIGHT: 178.56 LBS | SYSTOLIC BLOOD PRESSURE: 153 MMHG | HEART RATE: 63 BPM | BODY MASS INDEX: 31.63 KG/M2 | DIASTOLIC BLOOD PRESSURE: 80 MMHG

## 2019-10-29 DIAGNOSIS — H92.01 OTALGIA OF RIGHT EAR: Primary | ICD-10-CM

## 2019-10-29 DIAGNOSIS — H90.A31 MIXED CONDUCTIVE AND SENSORINEURAL HEARING LOSS OF RIGHT EAR WITH RESTRICTED HEARING OF LEFT EAR: Primary | ICD-10-CM

## 2019-10-29 DIAGNOSIS — H90.3 SENSORINEURAL HEARING LOSS (SNHL) OF BOTH EARS: ICD-10-CM

## 2019-10-29 PROCEDURE — 99999 PR PBB SHADOW E&M-EST. PATIENT-LVL III: CPT | Mod: PBBFAC,HCNC,, | Performed by: OTOLARYNGOLOGY

## 2019-10-29 PROCEDURE — 99999 PR PBB SHADOW E&M-EST. PATIENT-LVL III: ICD-10-PCS | Mod: PBBFAC,HCNC,, | Performed by: OTOLARYNGOLOGY

## 2019-10-29 PROCEDURE — 92557 PR COMPREHENSIVE HEARING TEST: ICD-10-PCS | Mod: HCNC,S$GLB,, | Performed by: AUDIOLOGIST

## 2019-10-29 PROCEDURE — 3077F SYST BP >= 140 MM HG: CPT | Mod: HCNC,CPTII,S$GLB, | Performed by: OTOLARYNGOLOGY

## 2019-10-29 PROCEDURE — 1101F PR PT FALLS ASSESS DOC 0-1 FALLS W/OUT INJ PAST YR: ICD-10-PCS | Mod: HCNC,CPTII,S$GLB, | Performed by: OTOLARYNGOLOGY

## 2019-10-29 PROCEDURE — 3079F DIAST BP 80-89 MM HG: CPT | Mod: HCNC,CPTII,S$GLB, | Performed by: OTOLARYNGOLOGY

## 2019-10-29 PROCEDURE — 92557 COMPREHENSIVE HEARING TEST: CPT | Mod: HCNC,S$GLB,, | Performed by: AUDIOLOGIST

## 2019-10-29 PROCEDURE — 92567 TYMPANOMETRY: CPT | Mod: HCNC,S$GLB,, | Performed by: AUDIOLOGIST

## 2019-10-29 PROCEDURE — 3077F PR MOST RECENT SYSTOLIC BLOOD PRESSURE >= 140 MM HG: ICD-10-PCS | Mod: HCNC,CPTII,S$GLB, | Performed by: OTOLARYNGOLOGY

## 2019-10-29 PROCEDURE — 99203 PR OFFICE/OUTPT VISIT, NEW, LEVL III, 30-44 MIN: ICD-10-PCS | Mod: HCNC,S$GLB,, | Performed by: OTOLARYNGOLOGY

## 2019-10-29 PROCEDURE — 99203 OFFICE O/P NEW LOW 30 MIN: CPT | Mod: HCNC,S$GLB,, | Performed by: OTOLARYNGOLOGY

## 2019-10-29 PROCEDURE — 99999 PR PBB SHADOW E&M-EST. PATIENT-LVL I: ICD-10-PCS | Mod: PBBFAC,HCNC,,

## 2019-10-29 PROCEDURE — 3079F PR MOST RECENT DIASTOLIC BLOOD PRESSURE 80-89 MM HG: ICD-10-PCS | Mod: HCNC,CPTII,S$GLB, | Performed by: OTOLARYNGOLOGY

## 2019-10-29 PROCEDURE — 92567 PR TYMPA2METRY: ICD-10-PCS | Mod: HCNC,S$GLB,, | Performed by: AUDIOLOGIST

## 2019-10-29 PROCEDURE — 99999 PR PBB SHADOW E&M-EST. PATIENT-LVL I: CPT | Mod: PBBFAC,HCNC,,

## 2019-10-29 PROCEDURE — 1101F PT FALLS ASSESS-DOCD LE1/YR: CPT | Mod: HCNC,CPTII,S$GLB, | Performed by: OTOLARYNGOLOGY

## 2019-10-29 NOTE — TELEPHONE ENCOUNTER
----- Message from DAMIEN Brewster MD sent at 10/28/2019  6:11 PM CDT -----  See my T con for this patient. She can see a mid-level provider if that is quicker

## 2019-10-29 NOTE — PROGRESS NOTES
Subjective:       Patient ID: Kelley Cruz is a 79 y.o. female.    Chief Complaint: Otalgia (RIGHT EAR) and Ear Fullness    Otalgia    There is pain in the right (Flew to [place] recently and pressure in right ear has persisted besides attempts at auto-aeration and steroid injection, oral antibiotics.) ear. Chronicity: First noted several weeks ago; intermittent. The current episode started more than 1 month ago. Episode frequency: Intermittent. The problem has been waxing and waning. There has been no fever. The pain is at a severity of 1/10. The pain is mild. Associated symptoms include hearing loss. Pertinent negatives include no coughing, diarrhea, ear discharge, headaches, neck pain, rash, rhinorrhea or vomiting. She has tried nothing for the symptoms. Her past medical history is significant for hearing loss.   Ear Fullness    There is pain in both ears. This is a chronic problem. The current episode started more than 1 month ago. The problem occurs constantly. The problem has been waxing and waning. There has been no fever. Associated symptoms include hearing loss. Pertinent negatives include no coughing, diarrhea, ear discharge, headaches, neck pain, rash, rhinorrhea or vomiting. She has tried nothing for the symptoms. Her past medical history is significant for hearing loss.     Review of Systems   Constitutional: Negative for activity change, appetite change and fever.   HENT: Positive for ear pain, hearing loss and nosebleeds. Negative for congestion, ear discharge, postnasal drip, rhinorrhea and sneezing.    Eyes: Negative for redness and visual disturbance.   Respiratory: Negative for apnea, cough, shortness of breath and wheezing.    Cardiovascular: Negative for chest pain and palpitations.   Gastrointestinal: Negative for diarrhea and vomiting.   Genitourinary: Negative for difficulty urinating and frequency.   Musculoskeletal: Negative for arthralgias, back pain, gait problem and neck pain.    Skin: Negative for color change and rash.   Neurological: Negative for dizziness, speech difficulty, weakness and headaches.   Hematological: Negative for adenopathy. Does not bruise/bleed easily.   Psychiatric/Behavioral: Negative for agitation and behavioral problems.       Objective:        Constitutional:   Vital signs are normal. She appears well-developed and well-nourished. She is active. Normal speech.      Head:  Normocephalic and atraumatic. Salivary glands normal.  Facial strength is normal.      Ears:  Hearing normal to normal and whispered voice; external ear normal without scars, lesions, or masses; ear canal, tympanic membrane, and middle ear normal..     Nose:  Nose normal including turbinates, nasal mucosa, sinuses and nasal septum.     Mouth/Throat  Oropharynx clear and moist without lesions or asymmetry and normal uvula midline.     Neck:  Neck normal without thyromegaly masses, asymmetry, normal tracheal structure, crepitus, and tenderness, thyroid normal, trachea normal, phonation normal and full range of motion with neck supple.     Psychiatric:   She has a normal mood and affect. Her speech is normal and behavior is normal.     Neurological:   She has neurological normal, alert and oriented.     Skin:   No abrasions, lacerations, lesions, or rashes.         As a result of this patients history and examination findings, a comprehensive audiogram was ordered to determine the level of hearing/hearing loss.           High frequency SNHL lef eart with mixed loss right ear. Excellent word discrimination scores and type-A tympanograms.        Assessment:       1. Otalgia of right ear    2. Sensorineural hearing loss (SNHL) of both ears        Plan:       1. Hearing conservation strongly recommended.  2. Trial of amplification bilaterally also recommended (patient not interested).  3. Re-check of hearing in 18-24 months or sooner if subjective change noted.  4. F/U with PCP as per schedule.  5.  Continue with auto-aeration 4-6 times per day; Afrin Nasal Spray- 2 puffs each nostril BID for 3 days and then D/C.

## 2019-10-29 NOTE — PROGRESS NOTES
Kelley Cruz was seen today in the clinic for an audiologic evaluation.  Patients main complaint was trouble with her right ear.  Mrs. Cruz reported that she has had reduced hearing and pressure in her right ear that begain 2 weeks ago.  Pt denied tinnitus and dizziness.  Pt feels her left ear is fine.    Tympanometry revealed Type A in the right ear and Type A in the left ear.  Audiogram results revealed mild sloping to severe mixed hearing loss in the right ear and normal sloping to mild SNHL in the left ear.  Speech reception thresholds were noted at 35 dB in the right ear and 25 dB in the left ear.  Speech discrimination scores were 92% in the right ear and 96% in the left ear.    Recommendations:  1. Otologic evaluation  2. Annual audiogram  3. Noise protection when in noise  4. Hearing aid consultation if necessary following medical management

## 2019-10-29 NOTE — LETTER
October 29, 2019      Yina Roth MD  [address]  [facility] [address]           [facility] - Otorhinolaryngology  [address]  [place] [address]  Phone: [phone]  Fax: [phone]          Patient: Kelley Cruz   MR Number: [id number]   YOB: 1940   Date of Visit: 10/29/2019       Dear Dr. Yina Roth:    Thank you for referring Kelley Cruz to me for evaluation. Attached you will find relevant portions of my assessment and plan of care.    If you have questions, please do not hesitate to call me. I look forward to following Kelley Cruz along with you.    Sincerely,    Rui Reid MD    Enclosure  CC:  No Recipients    If you would like to receive this communication electronically, please contact [email or web address] or [phone] to request more information on [employer] Link access.    For providers and/or their staff who would like to refer a patient to [facility], please contact us through our one-stop-shop provider referral line, [facility], at [phone].    If you feel you have received this communication in error or would no longer like to receive these types of communications, please e-mail [email or web address]

## 2019-11-12 ENCOUNTER — OFFICE VISIT (OUTPATIENT)
Dept: SPORTS MEDICINE | Facility: CLINIC | Age: 79
End: 2019-11-12
Payer: MEDICARE

## 2019-11-12 VITALS
SYSTOLIC BLOOD PRESSURE: 133 MMHG | WEIGHT: 178 LBS | HEART RATE: 64 BPM | HEIGHT: 63 IN | BODY MASS INDEX: 31.54 KG/M2 | DIASTOLIC BLOOD PRESSURE: 75 MMHG

## 2019-11-12 DIAGNOSIS — M94.262 CHONDROMALACIA OF LEFT KNEE: ICD-10-CM

## 2019-11-12 DIAGNOSIS — S83.232A COMPLEX TEAR OF MEDIAL MENISCUS OF LEFT KNEE, UNSPECIFIED WHETHER OLD OR CURRENT TEAR, INITIAL ENCOUNTER: Primary | ICD-10-CM

## 2019-11-12 DIAGNOSIS — M70.50 PES ANSERINE BURSITIS: ICD-10-CM

## 2019-11-12 PROCEDURE — 99999 PR PBB SHADOW E&M-EST. PATIENT-LVL III: CPT | Mod: PBBFAC,HCNC,, | Performed by: ORTHOPAEDIC SURGERY

## 2019-11-12 PROCEDURE — 1101F PR PT FALLS ASSESS DOC 0-1 FALLS W/OUT INJ PAST YR: ICD-10-PCS | Mod: HCNC,CPTII,S$GLB, | Performed by: ORTHOPAEDIC SURGERY

## 2019-11-12 PROCEDURE — 3078F PR MOST RECENT DIASTOLIC BLOOD PRESSURE < 80 MM HG: ICD-10-PCS | Mod: HCNC,CPTII,S$GLB, | Performed by: ORTHOPAEDIC SURGERY

## 2019-11-12 PROCEDURE — 99213 PR OFFICE/OUTPT VISIT, EST, LEVL III, 20-29 MIN: ICD-10-PCS | Mod: 25,HCNC,S$GLB, | Performed by: ORTHOPAEDIC SURGERY

## 2019-11-12 PROCEDURE — 20551 NJX 1 TENDON ORIGIN/INSJ: CPT | Mod: HCNC,LT,S$GLB, | Performed by: ORTHOPAEDIC SURGERY

## 2019-11-12 PROCEDURE — 1101F PT FALLS ASSESS-DOCD LE1/YR: CPT | Mod: HCNC,CPTII,S$GLB, | Performed by: ORTHOPAEDIC SURGERY

## 2019-11-12 PROCEDURE — 20551: ICD-10-PCS | Mod: HCNC,LT,S$GLB, | Performed by: ORTHOPAEDIC SURGERY

## 2019-11-12 PROCEDURE — 3075F SYST BP GE 130 - 139MM HG: CPT | Mod: HCNC,CPTII,S$GLB, | Performed by: ORTHOPAEDIC SURGERY

## 2019-11-12 PROCEDURE — 3078F DIAST BP <80 MM HG: CPT | Mod: HCNC,CPTII,S$GLB, | Performed by: ORTHOPAEDIC SURGERY

## 2019-11-12 PROCEDURE — 99213 OFFICE O/P EST LOW 20 MIN: CPT | Mod: 25,HCNC,S$GLB, | Performed by: ORTHOPAEDIC SURGERY

## 2019-11-12 PROCEDURE — 99999 PR PBB SHADOW E&M-EST. PATIENT-LVL III: ICD-10-PCS | Mod: PBBFAC,HCNC,, | Performed by: ORTHOPAEDIC SURGERY

## 2019-11-12 PROCEDURE — 3075F PR MOST RECENT SYSTOLIC BLOOD PRESS GE 130-139MM HG: ICD-10-PCS | Mod: HCNC,CPTII,S$GLB, | Performed by: ORTHOPAEDIC SURGERY

## 2019-11-12 RX ORDER — TRIAMCINOLONE ACETONIDE 40 MG/ML
40 INJECTION, SUSPENSION INTRA-ARTICULAR; INTRAMUSCULAR
Status: DISCONTINUED | OUTPATIENT
Start: 2019-11-12 | End: 2019-11-12 | Stop reason: HOSPADM

## 2019-11-12 RX ADMIN — TRIAMCINOLONE ACETONIDE 40 MG: 40 INJECTION, SUSPENSION INTRA-ARTICULAR; INTRAMUSCULAR at 09:11

## 2019-11-12 NOTE — LETTER
November 12, 2019      Yina Roth MD  [address]  [address]  [facility] [address]           [facility]  [address]  [facility] [address]  Phone: [phone]          Patient: Kelley Cruz   MR Number: [id number]   YOB: 1940   Date of Visit: 11/12/2019       Dear Dr. Yina Roth:    Thank you for referring Kelley Cruz to me for evaluation. Attached you will find relevant portions of my assessment and plan of care.    If you have questions, please do not hesitate to call me. I look forward to following Kelley Cruz along with you.    Sincerely,    W Parish Brewster MD    Enclosure  CC:  No Recipients    If you would like to receive this communication electronically, please contact [email or web address] or [phone] to request more information on [employer] Link access.    For providers and/or their staff who would like to refer a patient to [facility], please contact us through our one-stop-shop provider referral line, [facility], at [phone].    If you feel you have received this communication in error or would no longer like to receive these types of communications, please e-mail [email or web address]

## 2019-11-12 NOTE — PROGRESS NOTES
CC: Right knee pain    79 y.o. Female who returns today with follow up. MRI was discussed via telephone which showed some tricompartmental DJD with an associated MMT as well as semimembranosus tendinitis. Prior IA knee steroid injection provided her with some relief but continues to report pain over the pes> medial joint today. Denies swelling. She does report some intermittent clicking and catching within the knee.  Worse with going up and down stairs and deep knee bends.  The hamstring, pes complaint was worsened when riding a stationary bike in physical therapy. Denies instability. Not much improved with rest. Treatment thus far has included rest, activity modifications, oral medications, recent formal physical therapy, and some topical supplements. Here today to discuss diagnosis and treatment options.     PMHx notable for as listed below.   Negative for tobacco.   Negative for diabetes.      Pain Score:   4    REVIEW OF SYSTEMS:   Constitution: Negative. Negative for chills, fever and night sweats.    Hematologic/Lymphatic: Negative for bleeding problem. Does not bruise/bleed easily.   Skin: Negative for dry skin, itching and rash.   Musculoskeletal: Negative for falls. Positive for right knee pain and  muscle weakness.     All other review of symptoms were reviewed and found to be noncontributory.     PAST MEDICAL HISTORY:   Past Medical History:   Diagnosis Date    Cataract     Hypertension     Keloid cicatrix     Osteoporosis        PAST SURGICAL HISTORY:   Past Surgical History:   Procedure Laterality Date    HYSTERECTOMY      TAHBSO for benign pelvic cystis mass    OPEN REDUCTION AND INTERNAL FIXATION (ORIF) OF FRACTURE OF DISTAL RADIUS Right 1/23/2019    Procedure: ORIF, FRACTURE, RADIUS, DISTAL right;  Surgeon: Lyndsey Carson MD;  Location: [facility] OR [address] [place] [address];  Service: Orthopedics;  Laterality: Right;  Anesthesia: regional/MAC, stretcher, supine, hand pan 1 and pan 2       FAMILY HISTORY:    Family History   Problem Relation Age of Onset    Diabetes Mother     Glaucoma Mother     Cataracts Mother     Hypertension Father     Cancer Father     Diabetes Maternal Aunt     Diabetes Maternal Uncle     Melanoma Neg Hx     Blindness Neg Hx     Macular degeneration Neg Hx     Retinal detachment Neg Hx        SOCIAL HISTORY:   Social History     Socioeconomic History    Marital status:      Spouse name: Not on file    Number of children: Not on file    Years of education: Not on file    Highest education level: Not on file   Occupational History    Not on file   Social Needs    Financial resource strain: Not on file    Food insecurity:     Worry: Not on file     Inability: Not on file    Transportation needs:     Medical: Not on file     Non-medical: Not on file   Tobacco Use    Smoking status: Never Smoker    Smokeless tobacco: Never Used   Substance and Sexual Activity    Alcohol use: No    Drug use: No    Sexual activity: Not Currently   Lifestyle    Physical activity:     Days per week: Not on file     Minutes per session: Not on file    Stress: Not on file   Relationships    Social connections:     Talks on phone: Not on file     Gets together: Not on file     Attends Religion service: Not on file     Active member of club or organization: Not on file     Attends meetings of clubs or organizations: Not on file     Relationship status: Not on file   Other Topics Concern    Are you pregnant or think you may be? 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 She just returned from [place] where she went to the [employer]             MEDICATIONS:     Current Outpatient Medications:     ascorbic acid, vitamin C, (VITAMIN C) 500 MG tablet, Take 1 tablet (500 mg total) by mouth once daily., Disp: 50 tablet, Rfl: 0    cefUROXime (CEFTIN) 500 MG tablet, Take 1 tablet (500 mg total) by mouth 2 (two) times daily., Disp: 14  "tablet, Rfl: 0    cephALEXin (KEFLEX) 500 MG capsule, Take 1 capsule (500 mg total) by mouth every 8 (eight) hours., Disp: 30 capsule, Rfl: 0    losartan (COZAAR) 25 MG tablet, TAKE 1 TABLET BY MOUTH EVERY DAY, Disp: 90 tablet, Rfl: 1    meloxicam (MOBIC) 7.5 MG tablet, One tablet twice daily as needed for pain, Disp: 30 tablet, Rfl: 0    multivitamin (MULTIVITAMIN) per tablet, Take 1 tablet by mouth once daily., Disp: , Rfl:     triamterene-hydrochlorothiazide 37.5-25 mg (DYAZIDE) 37.5-25 mg per capsule, Take 1 capsule by mouth once daily., Disp: 90 capsule, Rfl: 2    varicella-zoster gE-AS01B, PF, (SHINGRIX, PF,) 50 mcg/0.5 mL injection, Inject into the muscle., Disp: 0.5 mL, Rfl: 0    traMADol (ULTRAM) 50 mg tablet, Take 1 tablet (50 mg total) by mouth every 6 (six) hours as needed for Pain. (Patient not taking: Reported on 10/25/2019), Disp: 20 tablet, Rfl: 0    ALLERGIES:   Review of patient's allergies indicates:   Allergen Reactions    Codeine      Other reaction(s): Headache    Pcn  [penicillins]      Other reaction(s): Nausea      PHYSICAL EXAMINATION:  /75   Pulse 64   Ht 5' 3" (1.6 m)   Wt 80.7 kg (178 lb)   BMI 31.53 kg/m²   General: Well-developed well-nourished 79 y.o. femalein no acute distress   Cardiovascular: Regular rhythm by palpation of distal pulse, normal color and temperature, no concerning varicosities on symptomatic side   Lungs: No labored breathing or wheezing appreciated   Neuro: Alert and oriented ×3   Psychiatric: well oriented to person, place and time, demonstrates normal mood and affect   Skin: No rashes, lesions or ulcers, normal temperature, turgor, and texture on involved extremity    Ortho/SPM Exam  Examination of the right knee demonstrates no effusion. Range of motion is 0-130 with pain at terminal flexion over medial soft tissues and medial joint line. Negative patellar grind test. Negative patellar apprehension. Positive medial joint line tenderness.  " Negative lateral joint line tenderness. Exquisite tenderness over pes anserine. Positive Kathleen's for pain medially. Mild mechanical symptoms. Negative Lachman. Negative posterior drawer. Knee stable with varus and valgus stress test at 30 degrees. Neurovascularly intact.     IMAGING:  X-rays including standing, weight bearing AP and flexion bilateral knees, RIGHT knee lateral and sunrise views ordered and images reviewed by me show:    Mild DJD.  The medial tibiofemoral joint space is slightly narrowed bilaterally.  No fracture or dislocation.  No bone destruction identified.  Soft tissue thickening noted anterior to the right patella and the small soft tissue calcification noted in the same area.  Clinical correlation is necessary.    Right knee MRI:   1. Complex medial meniscus tear with displaced flap.   2. Grade 1 sprain of MCL.   3. Tricompartmental cartilage loss.   4. Tendinosis of semimembranosus.   5. Small joint effusion with small Baker's cyst.    ASSESSMENT:      ICD-10-CM ICD-9-CM   1. Complex tear of medial meniscus of left knee, unspecified whether old or current tear, initial encounter S83.232A 836.0   2. Chondromalacia of left knee M94.262 717.7   3. Pes anserine bursitis M70.50 726.61     PLAN:     -Findings and treatment options were discussed with the patient. Patient has a combination of both medially based joint specific pain and meniscal pathology with pes bursitis.  Prior intra-articular steroid injection was of limited benefit.  Treatment has also included physical therapy.  We did discuss possible arthroscopy for debridement.  The patient understands that this would be a limited goals option. She does have some mechanical symptoms and I think the debridement would be beneficial from that standpoint.  On exam today, her symptoms are most focal to the pes bursa and my recommendation was to proceed with a localized steroid injection over this area.  Continue physical therapy.  If she does  not respond appropriately and continues to have joint specific complaints, next step would be to consider arthroscopy.  She is in agreement with the plan.  -She will call us and let us know how the injection does for her   -All questions answered.      Tendon Origin - Pes Bursa  Date/Time: 11/12/2019 9:15 AM  Performed by: DAMIEN Brewster MD  Authorized by: DAMIEN Brewster MD     Consent Done?:  Yes (Verbal)  Timeout: prior to procedure the correct patient, procedure, and site was verified    Indications:  Pain  Site marked: the procedure site was marked    Timeout: prior to procedure the correct patient, procedure, and site was verified    Prep: patient was prepped and draped in usual sterile fashion    Needle size:  22 G  Approach:  Anteromedial  Medications:  40 mg triamcinolone acetonide 40 mg/mL

## 2020-01-14 ENCOUNTER — OFFICE VISIT (OUTPATIENT)
Dept: OPTOMETRY | Facility: CLINIC | Age: 80
End: 2020-01-14
Payer: MEDICARE

## 2020-01-14 DIAGNOSIS — H52.13 MYOPIA OF BOTH EYES WITH ASTIGMATISM AND PRESBYOPIA: ICD-10-CM

## 2020-01-14 DIAGNOSIS — H25.13 NUCLEAR SCLEROSIS OF BOTH EYES: Primary | ICD-10-CM

## 2020-01-14 DIAGNOSIS — H52.203 MYOPIA OF BOTH EYES WITH ASTIGMATISM AND PRESBYOPIA: ICD-10-CM

## 2020-01-14 DIAGNOSIS — Z46.0 ENCOUNTER FOR FITTING OR ADJUSTMENT OF SPECTACLES OR CONTACT LENSES: Primary | ICD-10-CM

## 2020-01-14 DIAGNOSIS — H52.4 MYOPIA OF BOTH EYES WITH ASTIGMATISM AND PRESBYOPIA: ICD-10-CM

## 2020-01-14 DIAGNOSIS — Z13.5 SCREENING FOR EYE CONDITION: ICD-10-CM

## 2020-01-14 DIAGNOSIS — H43.393 VITREOUS FLOATERS OF BOTH EYES: ICD-10-CM

## 2020-01-14 PROCEDURE — 92310 PR CONTACT LENS FITTING (NO CHANGE): ICD-10-PCS | Mod: CSM,,, | Performed by: OPTOMETRIST

## 2020-01-14 PROCEDURE — 92310 CONTACT LENS FITTING OU: CPT | Mod: CSM,,, | Performed by: OPTOMETRIST

## 2020-01-14 PROCEDURE — 99499 NO LOS: ICD-10-PCS | Mod: HCNC,S$GLB,, | Performed by: OPTOMETRIST

## 2020-01-14 PROCEDURE — 99499 UNLISTED E&M SERVICE: CPT | Mod: HCNC,S$GLB,, | Performed by: OPTOMETRIST

## 2020-01-14 PROCEDURE — 99999 PR PBB SHADOW E&M-EST. PATIENT-LVL III: CPT | Mod: PBBFAC,HCNC,, | Performed by: OPTOMETRIST

## 2020-01-14 PROCEDURE — 99999 PR PBB SHADOW E&M-EST. PATIENT-LVL III: ICD-10-PCS | Mod: PBBFAC,HCNC,, | Performed by: OPTOMETRIST

## 2020-01-14 PROCEDURE — 92014 PR EYE EXAM, EST PATIENT,COMPREHESV: ICD-10-PCS | Mod: HCNC,S$GLB,, | Performed by: OPTOMETRIST

## 2020-01-14 PROCEDURE — 92015 DETERMINE REFRACTIVE STATE: CPT | Mod: HCNC,S$GLB,, | Performed by: OPTOMETRIST

## 2020-01-14 PROCEDURE — 92015 PR REFRACTION: ICD-10-PCS | Mod: HCNC,S$GLB,, | Performed by: OPTOMETRIST

## 2020-01-14 PROCEDURE — 92014 COMPRE OPH EXAM EST PT 1/>: CPT | Mod: HCNC,S$GLB,, | Performed by: OPTOMETRIST

## 2020-01-14 NOTE — PROGRESS NOTES
HPI     Contact Lens Follow Up      Additional comments: Patient in today for contact lens follow-up with   general eye examination.  Refer to additional patient encounter notes   dated 01/14/2020.                Comments     Patient in today for contact lens follow-up with general eye examination.    Refer to additional patient encounter notes dated 01/14/2020.            Last edited by Gurwinder Barth, OD on 1/14/2020 11:19 AM. (History)            Assessment /Plan     For exam results, see Encounter Report.    1. 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  No need for cataract surgery.  Monitor.     Slight asymmetry in optic nerve cup-to-disc ratio, greater in the left eye, as noted previously.  C/D ratio stable in each eye, and intraocular pressure normal in each eye.    Continue to monitor.      Vitreous floaters in the right eye.  No evidence of retinal etiology. Monitor.     Myopia with astigmatism in each eye, and presbyopia consistent with age.  New spectacle lens Rx issued for use in lieu of SCLs.     Wears Multifocal soft CLs.    Good contact lens fit in each eye.  Showing need for only minor power change in each lens per over refraction done today.      New CL Rx issued.    Okay to use +2.50 OTC reading glasses over CLs as needed.     Recheck in one year - or prior if any problems in the interim.

## 2020-01-14 NOTE — PATIENT INSTRUCTIONS
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  No need for cataract surgery.  Monitor.     Slight asymmetry in optic nerve cup-to-disc ratio, greater in the left eye, as noted previously.  C/D ratio stable in each eye, and intraocular pressure normal in each eye.    Continue to monitor.      Vitreous floaters in the right eye.  No evidence of retinal etiology. Monitor.     Myopia with astigmatism in each eye, and presbyopia consistent with age.  New spectacle lens Rx issued for use in lieu of SCLs.     Wears Multifocal soft CLs.    Good contact lens fit in each eye.  Showing need for only minor power change in each lens per over refraction done today.      New CL Rx issued.    Okay to use +2.50 OTC reading glasses over CLs as needed.     Recheck in one year - or prior if any problems in the interim.

## 2020-01-14 NOTE — PROGRESS NOTES
HPI     eye examination       Additional comments: Annual general eye exam and refraction and contact   lens follow-up.                Comments     DLS:  08/29/2018  Patient's age: 79 y.o. Female   Occupation: Retired  Approximate date of last eye examination:  08/29/2018  Name of last eye doctor seen: Dr Barth  City/State: [place]  Wears glasses? yes     If yes, wears  Full-time or part-time?  Part time  Present glasses are: Bifocal, SV Distance, SV Reading?  SV reading   Approximate age of present glasses:    Got new glasses following last exam, or subsequently?:     Any problem with VA with glasses?  no  Wears CLs?:  yes           If yes:              Type of CL worn:  Proclear Multifocal 8.70   14.4   -2.25     Sphere / +2.50 add        8.70   14.4   -4.75   Sphere / +2.50 add               Wears full-time or part-time:  Full time               Sleeps with contact lenses:  no               CL Solution used:  Dagmar               How often replace CLs:  Every 30 days              Any problem with [facility] with CLs?  no              Has patient read and signed the contact lens fee information   document? no  Headaches?  no  Eye pain/discomfort?  no                                                                                     Flashes?  no  Floaters?  no  Diplopia/Double vision?  no  Patient's Ocular History:         Any eye surgeries? no         Any eye injury?  no         Any treatment for eye disease?  no  Family history of eye disease?  Mother-glaucoma  Significant patient medical history:         1. Diabetes?  no       If yes, IDDM or NIDDM? n/a   2. HBP?  no                 ! OTC eyedrops currently using:  no   ! Prescription eye meds currently using:  no   ! Any history of allergy/adverse reaction to any eye meds used   previously?  no    ! Any history of allergy/adverse reaction to eyedrops used during prior   eye exam(s)? no    ! Any history of allergy/adverse reaction to Novacaine or similar meds?  "  no   ! Any history of allergy/adverse reaction to Epinephrine or similar meds?   no    ! Patient okay with use of anesthetic eyedrops to check eye pressure?    yes        ! Patient okay with use of eyedrops to dilate pupils today?  yes   !  Allergies/Medications/Medical History/Family History reviewed today?    yes      PD =   65/61  Desired reading distance =  17.75"                                                                        Last edited by Gurwinder Barth, OD on 1/14/2020 11:29 AM. (History)            Assessment /Plan     For exam results, see Encounter Report.    1. Nuclear sclerosis of both eyes     2. Vitreous floaters of both eyes     3. Myopia of both eyes with astigmatism and presbyopia     4. 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  No need for cataract surgery.  Monitor.     Slight asymmetry in optic nerve cup-to-disc ratio, greater in the left eye, as noted previously.  C/D ratio stable in each eye, and intraocular pressure normal in each eye.    Continue to monitor.      Vitreous floaters in the right eye.  No evidence of retinal etiology. Monitor.     Myopia with astigmatism in each eye, and presbyopia consistent with age.  New spectacle lens Rx issued for use in lieu of SCLs.     Wears Multifocal soft CLs.    Good contact lens fit in each eye.  Showing need for only minor power change in each lens per over refraction done today.      New CL Rx issued.    Okay to use +2.50 OTC reading glasses over CLs as needed.     Recheck in one year - or prior if any problems in the interim.          "

## 2020-03-23 RX ORDER — CEFUROXIME AXETIL 500 MG/1
TABLET ORAL
Qty: 14 TABLET | Refills: 0 | OUTPATIENT
Start: 2020-03-23

## 2020-03-23 RX ORDER — LOSARTAN POTASSIUM 25 MG/1
25 TABLET ORAL DAILY
Qty: 90 TABLET | Refills: 1 | Status: SHIPPED | OUTPATIENT
Start: 2020-03-23 | End: 2020-08-31 | Stop reason: SDUPTHER

## 2020-03-23 NOTE — TELEPHONE ENCOUNTER
----- Message from Diane Haynes sent at 3/23/2020 10:34 AM CDT -----  Contact: Self  Pt is calling to speak with Staff regarding a medication refill for losartan (COZAAR) 25 MG tablet.    She can be reached at [phone].    Thank you.

## 2020-03-30 PROBLEM — M79.89 SWELLING OF RIGHT HAND: Status: RESOLVED | Noted: 2019-02-15 | Resolved: 2020-03-30

## 2020-03-30 PROBLEM — M25.531 WRIST PAIN, RIGHT: Status: RESOLVED | Noted: 2019-02-15 | Resolved: 2020-03-30

## 2020-03-30 PROBLEM — M25.60 DECREASED RANGE OF MOTION: Status: RESOLVED | Noted: 2019-02-15 | Resolved: 2020-03-30

## 2020-06-01 ENCOUNTER — TELEPHONE (OUTPATIENT)
Dept: INTERNAL MEDICINE | Facility: CLINIC | Age: 80
End: 2020-06-01

## 2020-06-01 NOTE — TELEPHONE ENCOUNTER
----- Message from Joanie Maldonado sent at 6/1/2020 10:26 AM CDT -----  Contact: self [phone]  Type: Orders Request    What orders/ testing are being requested? mammo          Comments: pt received a letter that it was time for her Mammo, please enter order and call patient to let her know to schedule    thanks

## 2020-06-18 ENCOUNTER — TELEPHONE (OUTPATIENT)
Dept: INTERNAL MEDICINE | Facility: CLINIC | Age: 80
End: 2020-06-18

## 2020-06-18 ENCOUNTER — TELEPHONE (OUTPATIENT)
Dept: SPORTS MEDICINE | Facility: CLINIC | Age: 80
End: 2020-06-18

## 2020-06-18 DIAGNOSIS — Z12.31 SCREENING MAMMOGRAM, ENCOUNTER FOR: Primary | ICD-10-CM

## 2020-06-18 NOTE — TELEPHONE ENCOUNTER
----- Message from Sapna Romo sent at 6/18/2020  1:21 PM CDT -----  Regarding: FW: PT  Contact: PT    ----- Message -----  From: Amanda Ruiz  Sent: 6/18/2020   1:07 PM CDT  To: [facility] Staff  Subject: PT                                               PT is having pain in the right knee and the doctor told her the last time her saw her that if she's still having issues with it, maybe they'll need to scope her knee so she would like to make an appointment for that. Please call back     Callback: [phone]

## 2020-06-18 NOTE — TELEPHONE ENCOUNTER
----- Message from Jennifer Delgado sent at 6/18/2020 12:21 PM CDT -----  Contact: patient [phone] cell  Patient called 2 weeks ago to request an order for her annual mammogram. Her last one was 5/30/20 according to the letter she received. Patient would like a call back when this order is entered so she can schedule it herself.

## 2020-07-01 ENCOUNTER — HOSPITAL ENCOUNTER (OUTPATIENT)
Dept: RADIOLOGY | Facility: OTHER | Age: 80
Discharge: HOME | End: 2020-07-01
Attending: INTERNAL MEDICINE
Payer: MEDICARE

## 2020-07-01 DIAGNOSIS — Z12.31 SCREENING MAMMOGRAM, ENCOUNTER FOR: ICD-10-CM

## 2020-07-01 PROCEDURE — 77067 SCR MAMMO BI INCL CAD: CPT | Mod: 26,HCNC,, | Performed by: RADIOLOGY

## 2020-07-01 PROCEDURE — 77063 MAMMO DIGITAL SCREENING BILAT WITH TOMOSYNTHESIS_CAD: ICD-10-PCS | Mod: 26,HCNC,, | Performed by: RADIOLOGY

## 2020-07-01 PROCEDURE — 77067 SCR MAMMO BI INCL CAD: CPT | Mod: TC,HCNC

## 2020-07-01 PROCEDURE — 77067 MAMMO DIGITAL SCREENING BILAT WITH TOMOSYNTHESIS_CAD: ICD-10-PCS | Mod: 26,HCNC,, | Performed by: RADIOLOGY

## 2020-07-01 PROCEDURE — 77063 BREAST TOMOSYNTHESIS BI: CPT | Mod: 26,HCNC,, | Performed by: RADIOLOGY

## 2020-07-01 NOTE — PROGRESS NOTES
CC: Right knee pain    79 y.o. Female who returns today with follow up of RIGHT knee complex medial meniscus tear with associated chondromalacia.  Well-maintained joint spaces otherwise.  Treatment thus far has included rest, activity modifications, oral medications, recent formal physical therapy, and some topical supplements. Patient underwent RIGHT knee pes anserine bursa injection on 11/12/19 which provided around 70% and she no longer has pain in this specific area. She continues to have pain over the medial joint line. Patient had Prior IA knee steroid injection from a midlevel provider on 10/25/19 provided her with no relief. Reports mild swelling. She does report some intermittent clicking and catching within the knee.  Worse with going up and down stairs and kneeling.  Here today to discuss diagnosis and treatment options. We discussed that if she continued to have joint specific complaints, that we would consider moving forward with arthroscopic intervention.    PMHx notable for as listed below.   Negative for tobacco.   Negative for diabetes.      Pain Score:   3    REVIEW OF SYSTEMS:   Constitution: Negative. Negative for chills, fever and night sweats.    Hematologic/Lymphatic: Negative for bleeding problem. Does not bruise/bleed easily.   Skin: Negative for dry skin, itching and rash.   Musculoskeletal: Negative for falls. Positive for right knee pain and  muscle weakness.     All other review of symptoms were reviewed and found to be noncontributory.     PAST MEDICAL HISTORY:   Past Medical History:   Diagnosis Date    Cataract     Hypertension     Keloid cicatrix     Osteoporosis        PAST SURGICAL HISTORY:   Past Surgical History:   Procedure Laterality Date    HYSTERECTOMY      TAHBSO for benign pelvic cystis mass    OPEN REDUCTION AND INTERNAL FIXATION (ORIF) OF FRACTURE OF DISTAL RADIUS Right 1/23/2019    Procedure: ORIF, FRACTURE, RADIUS, DISTAL right;  Surgeon: Lyndsey Carson MD;   Location: [facility] OR [address] [place] [address];  Service: Orthopedics;  Laterality: Right;  Anesthesia: regional/MAC, stretcher, supine, hand pan 1 and pan 2       FAMILY HISTORY:   Family History   Problem Relation Age of Onset    Diabetes Mother     Glaucoma Mother     Cataracts Mother     Hypertension Father     Cancer Father     Diabetes Maternal Aunt     Diabetes Maternal Uncle     Melanoma Neg Hx     Blindness Neg Hx     Macular degeneration Neg Hx     Retinal detachment Neg Hx        SOCIAL HISTORY:   Social History     Socioeconomic History    Marital status:      Spouse name: Not on file    Number of children: Not on file    Years of education: Not on file    Highest education level: Not on file   Occupational History    Not on file   Social Needs    Financial resource strain: Not on file    Food insecurity:     Worry: Not on file     Inability: Not on file    Transportation needs:     Medical: Not on file     Non-medical: Not on file   Tobacco Use    Smoking status: Never Smoker    Smokeless tobacco: Never Used   Substance and Sexual Activity    Alcohol use: No    Drug use: No    Sexual activity: Not Currently   Lifestyle    Physical activity:     Days per week: Not on file     Minutes per session: Not on file    Stress: Not on file   Relationships    Social connections:     Talks on phone: Not on file     Gets together: Not on file     Attends Congregational service: Not on file     Active member of club or organization: Not on file     Attends meetings of clubs or organizations: Not on file     Relationship status: Not on file   Other Topics Concern    Are you pregnant or think you may be? 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 She just returned from [place] where she went to the [employer]             MEDICATIONS:     Current Outpatient Medications:     ascorbic acid, vitamin C, (VITAMIN C) 500 MG tablet, Take 1 tablet (500 mg  "total) by mouth once daily., Disp: 50 tablet, Rfl: 0    cefUROXime (CEFTIN) 500 MG tablet, Take 1 tablet (500 mg total) by mouth 2 (two) times daily., Disp: 14 tablet, Rfl: 0    cephALEXin (KEFLEX) 500 MG capsule, Take 1 capsule (500 mg total) by mouth every 8 (eight) hours., Disp: 30 capsule, Rfl: 0    losartan (COZAAR) 25 MG tablet, TAKE 1 TABLET BY MOUTH EVERY DAY, Disp: 90 tablet, Rfl: 1    meloxicam (MOBIC) 7.5 MG tablet, One tablet twice daily as needed for pain, Disp: 30 tablet, Rfl: 0    multivitamin (MULTIVITAMIN) per tablet, Take 1 tablet by mouth once daily., Disp: , Rfl:     triamterene-hydrochlorothiazide 37.5-25 mg (DYAZIDE) 37.5-25 mg per capsule, Take 1 capsule by mouth once daily., Disp: 90 capsule, Rfl: 2    varicella-zoster gE-AS01B, PF, (SHINGRIX, PF,) 50 mcg/0.5 mL injection, Inject into the muscle., Disp: 0.5 mL, Rfl: 0    traMADol (ULTRAM) 50 mg tablet, Take 1 tablet (50 mg total) by mouth every 6 (six) hours as needed for Pain. (Patient not taking: Reported on 10/25/2019), Disp: 20 tablet, Rfl: 0    ALLERGIES:   Review of patient's allergies indicates:   Allergen Reactions    Codeine      Other reaction(s): Headache    Pcn  [penicillins]      Other reaction(s): Nausea      PHYSICAL EXAMINATION:  /75   Pulse 64   Ht 5' 3" (1.6 m)   Wt 80.7 kg (178 lb)   BMI 31.53 kg/m²   General: Well-developed well-nourished 79 y.o. femalein no acute distress   Cardiovascular: Regular rhythm by palpation of distal pulse, normal color and temperature, no concerning varicosities on symptomatic side   Lungs: No labored breathing or wheezing appreciated   Neuro: Alert and oriented ×3   Psychiatric: well oriented to person, place and time, demonstrates normal mood and affect   Skin: No rashes, lesions or ulcers, normal temperature, turgor, and texture on involved extremity    Ortho/SPM Exam  Examination of the right knee demonstrates no effusion. Range of motion is 0-130 with pain at terminal " flexion over medial soft tissues and medial joint line. Negative patellar grind test. Negative patellar apprehension. Positive medial joint line tenderness.  Negative lateral joint line tenderness. Exquisite tenderness over pes anserine. Positive Kathleen's for pain medially. Mild mechanical symptoms. Negative Lachman. Negative posterior drawer. Knee stable with varus and valgus stress test at 30 degrees. Neurovascularly intact.     IMAGING   Right knee MRI on 10/23/19:   1. Complex medial meniscus tear with displaced flap.   2. Grade 1 sprain of MCL.   3. Tricompartmental cartilage loss.   4. Tendinosis of semimembranosus.   5. Small joint effusion with small Baker's cyst.    ASSESSMENT:    Right knee complex medial meniscus tear  Right knee chondromalacia    PLAN:     The patient returns with more joint specific complaints and mechanical symptoms today.  Prior conservative treatment has been extensive.  She does have some underlying chondromalacia but nothing too advanced on prior MRI last fall.  At this point a running out of options treat the patient.  I do think given her prominent mechanical symptoms that a limited goals arthroscopic debridement is reasonable to consider at this time.  I would like to repeat the MRI to assess for any interval changes to include possible subchondral bone marrow edema.  I will see her back after that MRI to discuss results and treatment options to include possible arthroscopy for debridement.     Procedures

## 2020-07-02 ENCOUNTER — OFFICE VISIT (OUTPATIENT)
Dept: SPORTS MEDICINE | Facility: CLINIC | Age: 80
End: 2020-07-02
Payer: MEDICARE

## 2020-07-02 VITALS
WEIGHT: 178 LBS | DIASTOLIC BLOOD PRESSURE: 73 MMHG | BODY MASS INDEX: 31.54 KG/M2 | HEIGHT: 63 IN | HEART RATE: 65 BPM | SYSTOLIC BLOOD PRESSURE: 117 MMHG

## 2020-07-02 DIAGNOSIS — S83.232D COMPLEX TEAR OF MEDIAL MENISCUS OF LEFT KNEE, UNSPECIFIED WHETHER OLD OR CURRENT TEAR, SUBSEQUENT ENCOUNTER: Primary | ICD-10-CM

## 2020-07-02 DIAGNOSIS — M94.261 CHONDROMALACIA OF RIGHT KNEE: ICD-10-CM

## 2020-07-02 PROCEDURE — 3078F DIAST BP <80 MM HG: CPT | Mod: HCNC,CPTII,S$GLB, | Performed by: ORTHOPAEDIC SURGERY

## 2020-07-02 PROCEDURE — 1159F PR MEDICATION LIST DOCUMENTED IN MEDICAL RECORD: ICD-10-PCS | Mod: HCNC,S$GLB,, | Performed by: ORTHOPAEDIC SURGERY

## 2020-07-02 PROCEDURE — 99213 OFFICE O/P EST LOW 20 MIN: CPT | Mod: HCNC,S$GLB,, | Performed by: ORTHOPAEDIC SURGERY

## 2020-07-02 PROCEDURE — 1101F PT FALLS ASSESS-DOCD LE1/YR: CPT | Mod: HCNC,CPTII,S$GLB, | Performed by: ORTHOPAEDIC SURGERY

## 2020-07-02 PROCEDURE — 3078F PR MOST RECENT DIASTOLIC BLOOD PRESSURE < 80 MM HG: ICD-10-PCS | Mod: HCNC,CPTII,S$GLB, | Performed by: ORTHOPAEDIC SURGERY

## 2020-07-02 PROCEDURE — 99213 PR OFFICE/OUTPT VISIT, EST, LEVL III, 20-29 MIN: ICD-10-PCS | Mod: HCNC,S$GLB,, | Performed by: ORTHOPAEDIC SURGERY

## 2020-07-02 PROCEDURE — 1125F PR PAIN SEVERITY QUANTIFIED, PAIN PRESENT: ICD-10-PCS | Mod: HCNC,S$GLB,, | Performed by: ORTHOPAEDIC SURGERY

## 2020-07-02 PROCEDURE — 3074F PR MOST RECENT SYSTOLIC BLOOD PRESSURE < 130 MM HG: ICD-10-PCS | Mod: HCNC,CPTII,S$GLB, | Performed by: ORTHOPAEDIC SURGERY

## 2020-07-02 PROCEDURE — 1125F AMNT PAIN NOTED PAIN PRSNT: CPT | Mod: HCNC,S$GLB,, | Performed by: ORTHOPAEDIC SURGERY

## 2020-07-02 PROCEDURE — 99999 PR PBB SHADOW E&M-EST. PATIENT-LVL III: CPT | Mod: PBBFAC,HCNC,, | Performed by: ORTHOPAEDIC SURGERY

## 2020-07-02 PROCEDURE — 3074F SYST BP LT 130 MM HG: CPT | Mod: HCNC,CPTII,S$GLB, | Performed by: ORTHOPAEDIC SURGERY

## 2020-07-02 PROCEDURE — 1159F MED LIST DOCD IN RCRD: CPT | Mod: HCNC,S$GLB,, | Performed by: ORTHOPAEDIC SURGERY

## 2020-07-02 PROCEDURE — 1101F PR PT FALLS ASSESS DOC 0-1 FALLS W/OUT INJ PAST YR: ICD-10-PCS | Mod: HCNC,CPTII,S$GLB, | Performed by: ORTHOPAEDIC SURGERY

## 2020-07-02 PROCEDURE — 99999 PR PBB SHADOW E&M-EST. PATIENT-LVL III: ICD-10-PCS | Mod: PBBFAC,HCNC,, | Performed by: ORTHOPAEDIC SURGERY

## 2020-07-13 ENCOUNTER — TELEPHONE (OUTPATIENT)
Dept: SPORTS MEDICINE | Facility: CLINIC | Age: 80
End: 2020-07-13

## 2020-07-13 NOTE — TELEPHONE ENCOUNTER
----- Message from Vin Goetz sent at 7/13/2020  9:52 AM CDT -----  Regarding: MRI Appointment  Could you schedule this patients MRI please?    Vin Goetz Ms, OTC,   Clinical Assistant to Dr. Brewster    ----- Message -----  From: DAMIEN Brewster MD  Sent: 7/12/2020   9:50 PM CDT  To: Vin Goetz    This patient needs to schedule her MRI.

## 2020-07-13 NOTE — TELEPHONE ENCOUNTER
Spoke with patient about scheduling MRI at the imaging center across from [facility] on [facility]. Scheduled for 7/15/20 @ 9:45AM

## 2020-07-15 ENCOUNTER — HOSPITAL ENCOUNTER (OUTPATIENT)
Dept: RADIOLOGY | Facility: HOSPITAL | Age: 80
Discharge: HOME | End: 2020-07-15
Attending: ORTHOPAEDIC SURGERY
Payer: MEDICARE

## 2020-07-15 DIAGNOSIS — S83.232D COMPLEX TEAR OF MEDIAL MENISCUS OF LEFT KNEE, UNSPECIFIED WHETHER OLD OR CURRENT TEAR, SUBSEQUENT ENCOUNTER: ICD-10-CM

## 2020-07-15 DIAGNOSIS — M94.261 CHONDROMALACIA OF RIGHT KNEE: ICD-10-CM

## 2020-07-15 PROCEDURE — 73721 MRI JNT OF LWR EXTRE W/O DYE: CPT | Mod: 26,HCNC,RT, | Performed by: RADIOLOGY

## 2020-07-15 PROCEDURE — 73721 MRI KNEE WITHOUT CONTRAST RIGHT: ICD-10-PCS | Mod: 26,HCNC,RT, | Performed by: RADIOLOGY

## 2020-07-15 PROCEDURE — 73721 MRI JNT OF LWR EXTRE W/O DYE: CPT | Mod: TC,HCNC,RT

## 2020-08-19 ENCOUNTER — TELEPHONE (OUTPATIENT)
Dept: SPORTS MEDICINE | Facility: CLINIC | Age: 80
End: 2020-08-19

## 2020-08-19 NOTE — TELEPHONE ENCOUNTER
I called the patient to discuss MRI results for her knee.  I have not seen her since July.  The MRI does show a significant amount of chondral wear over the medial and patellofemoral compartments.  She has got some subchondral edema of the medial femoral condyle and medial tibial plateau in the setting of a degenerative medial meniscus tear.  It is my opinion that arthroscopic debridement and partial meniscectomy would be unlikely to to provide any meaningful, durable pain relief.  I do have concern that such a procedure also would cause symptomatic progression of her arthritis.  As such I would not recommend surgery for this.  Not ready for knee replacement.  Plan to maximize non operative treatment options at this time.  She is feeling better recently with simple conservative treatment.  I will see her back as needed.  Could consider reinjection.

## 2020-08-26 ENCOUNTER — PATIENT OUTREACH (OUTPATIENT)
Dept: ADMINISTRATIVE | Facility: HOSPITAL | Age: 80
End: 2020-08-26

## 2020-08-26 DIAGNOSIS — Z12.11 COLON CANCER SCREENING: Primary | ICD-10-CM

## 2020-08-26 DIAGNOSIS — Z11.59 NEED FOR HEPATITIS C SCREENING TEST: ICD-10-CM

## 2020-08-27 ENCOUNTER — LAB VISIT (OUTPATIENT)
Dept: LAB | Facility: OTHER | Age: 80
End: 2020-08-27
Payer: MEDICARE

## 2020-08-27 DIAGNOSIS — Z11.59 NEED FOR HEPATITIS C SCREENING TEST: ICD-10-CM

## 2020-08-27 LAB — HCV AB SERPL QL IA: NEGATIVE

## 2020-08-27 PROCEDURE — 36415 COLL VENOUS BLD VENIPUNCTURE: CPT | Mod: HCNC

## 2020-08-27 PROCEDURE — 86803 HEPATITIS C AB TEST: CPT | Mod: HCNC

## 2020-08-31 ENCOUNTER — OFFICE VISIT (OUTPATIENT)
Dept: INTERNAL MEDICINE | Facility: CLINIC | Age: 80
End: 2020-08-31
Payer: MEDICARE

## 2020-08-31 ENCOUNTER — LAB VISIT (OUTPATIENT)
Dept: LAB | Facility: HOSPITAL | Age: 80
End: 2020-08-31
Attending: INTERNAL MEDICINE
Payer: MEDICARE

## 2020-08-31 VITALS
HEART RATE: 76 BPM | WEIGHT: 176.38 LBS | BODY MASS INDEX: 31.25 KG/M2 | SYSTOLIC BLOOD PRESSURE: 118 MMHG | TEMPERATURE: 98 F | OXYGEN SATURATION: 99 % | HEIGHT: 63 IN | DIASTOLIC BLOOD PRESSURE: 68 MMHG

## 2020-08-31 DIAGNOSIS — E78.5 HYPERLIPIDEMIA, UNSPECIFIED HYPERLIPIDEMIA TYPE: ICD-10-CM

## 2020-08-31 DIAGNOSIS — M85.80 OSTEOPENIA, UNSPECIFIED LOCATION: ICD-10-CM

## 2020-08-31 DIAGNOSIS — I10 ESSENTIAL HYPERTENSION: ICD-10-CM

## 2020-08-31 DIAGNOSIS — Z12.11 SCREENING FOR COLON CANCER: ICD-10-CM

## 2020-08-31 DIAGNOSIS — R59.9 ADENOPATHY: ICD-10-CM

## 2020-08-31 DIAGNOSIS — I10 ESSENTIAL HYPERTENSION: Primary | ICD-10-CM

## 2020-08-31 DIAGNOSIS — R73.9 HYPERGLYCEMIA: ICD-10-CM

## 2020-08-31 DIAGNOSIS — M81.0 AGE-RELATED OSTEOPOROSIS WITHOUT CURRENT PATHOLOGICAL FRACTURE: ICD-10-CM

## 2020-08-31 DIAGNOSIS — E55.9 MILD VITAMIN D DEFICIENCY: ICD-10-CM

## 2020-08-31 LAB
BASOPHILS # BLD AUTO: 0.06 K/UL (ref 0–0.2)
BASOPHILS NFR BLD: 0.7 % (ref 0–1.9)
DIFFERENTIAL METHOD: ABNORMAL
EOSINOPHIL # BLD AUTO: 0.1 K/UL (ref 0–0.5)
EOSINOPHIL NFR BLD: 1.4 % (ref 0–8)
ERYTHROCYTE [DISTWIDTH] IN BLOOD BY AUTOMATED COUNT: 12.7 % (ref 11.5–14.5)
HCT VFR BLD AUTO: 43.7 % (ref 37–48.5)
HGB BLD-MCNC: 13.4 G/DL (ref 12–16)
IMM GRANULOCYTES # BLD AUTO: 0.02 K/UL (ref 0–0.04)
IMM GRANULOCYTES NFR BLD AUTO: 0.2 % (ref 0–0.5)
LYMPHOCYTES # BLD AUTO: 2.2 K/UL (ref 1–4.8)
LYMPHOCYTES NFR BLD: 27.7 % (ref 18–48)
MCH RBC QN AUTO: 29.8 PG (ref 27–31)
MCHC RBC AUTO-ENTMCNC: 30.7 G/DL (ref 32–36)
MCV RBC AUTO: 97 FL (ref 82–98)
MONOCYTES # BLD AUTO: 0.9 K/UL (ref 0.3–1)
MONOCYTES NFR BLD: 11.3 % (ref 4–15)
NEUTROPHILS # BLD AUTO: 4.7 K/UL (ref 1.8–7.7)
NEUTROPHILS NFR BLD: 58.7 % (ref 38–73)
NRBC BLD-RTO: 0 /100 WBC
PLATELET # BLD AUTO: 267 K/UL (ref 150–350)
PMV BLD AUTO: 10.7 FL (ref 9.2–12.9)
RBC # BLD AUTO: 4.5 M/UL (ref 4–5.4)
WBC # BLD AUTO: 8.06 K/UL (ref 3.9–12.7)

## 2020-08-31 PROCEDURE — 82043 UR ALBUMIN QUANTITATIVE: CPT | Mod: HCNC

## 2020-08-31 PROCEDURE — 82306 VITAMIN D 25 HYDROXY: CPT | Mod: HCNC

## 2020-08-31 PROCEDURE — 80061 LIPID PANEL: CPT | Mod: HCNC

## 2020-08-31 PROCEDURE — 3074F PR MOST RECENT SYSTOLIC BLOOD PRESSURE < 130 MM HG: ICD-10-PCS | Mod: HCNC,CPTII,S$GLB, | Performed by: INTERNAL MEDICINE

## 2020-08-31 PROCEDURE — 3074F SYST BP LT 130 MM HG: CPT | Mod: HCNC,CPTII,S$GLB, | Performed by: INTERNAL MEDICINE

## 2020-08-31 PROCEDURE — 3078F DIAST BP <80 MM HG: CPT | Mod: HCNC,CPTII,S$GLB, | Performed by: INTERNAL MEDICINE

## 2020-08-31 PROCEDURE — 1101F PT FALLS ASSESS-DOCD LE1/YR: CPT | Mod: HCNC,CPTII,S$GLB, | Performed by: INTERNAL MEDICINE

## 2020-08-31 PROCEDURE — 36415 COLL VENOUS BLD VENIPUNCTURE: CPT | Mod: HCNC

## 2020-08-31 PROCEDURE — 1126F AMNT PAIN NOTED NONE PRSNT: CPT | Mod: HCNC,S$GLB,, | Performed by: INTERNAL MEDICINE

## 2020-08-31 PROCEDURE — 84443 ASSAY THYROID STIM HORMONE: CPT | Mod: HCNC

## 2020-08-31 PROCEDURE — 81001 URINALYSIS AUTO W/SCOPE: CPT | Mod: HCNC

## 2020-08-31 PROCEDURE — 80053 COMPREHEN METABOLIC PANEL: CPT | Mod: HCNC

## 2020-08-31 PROCEDURE — 3078F PR MOST RECENT DIASTOLIC BLOOD PRESSURE < 80 MM HG: ICD-10-PCS | Mod: HCNC,CPTII,S$GLB, | Performed by: INTERNAL MEDICINE

## 2020-08-31 PROCEDURE — 1101F PR PT FALLS ASSESS DOC 0-1 FALLS W/OUT INJ PAST YR: ICD-10-PCS | Mod: HCNC,CPTII,S$GLB, | Performed by: INTERNAL MEDICINE

## 2020-08-31 PROCEDURE — 99214 OFFICE O/P EST MOD 30 MIN: CPT | Mod: HCNC,S$GLB,, | Performed by: INTERNAL MEDICINE

## 2020-08-31 PROCEDURE — 99999 PR PBB SHADOW E&M-EST. PATIENT-LVL IV: ICD-10-PCS | Mod: PBBFAC,HCNC,, | Performed by: INTERNAL MEDICINE

## 2020-08-31 PROCEDURE — 1159F PR MEDICATION LIST DOCUMENTED IN MEDICAL RECORD: ICD-10-PCS | Mod: HCNC,S$GLB,, | Performed by: INTERNAL MEDICINE

## 2020-08-31 PROCEDURE — 99214 PR OFFICE/OUTPT VISIT, EST, LEVL IV, 30-39 MIN: ICD-10-PCS | Mod: HCNC,S$GLB,, | Performed by: INTERNAL MEDICINE

## 2020-08-31 PROCEDURE — 99999 PR PBB SHADOW E&M-EST. PATIENT-LVL IV: CPT | Mod: PBBFAC,HCNC,, | Performed by: INTERNAL MEDICINE

## 2020-08-31 PROCEDURE — 1159F MED LIST DOCD IN RCRD: CPT | Mod: HCNC,S$GLB,, | Performed by: INTERNAL MEDICINE

## 2020-08-31 PROCEDURE — 1126F PR PAIN SEVERITY QUANTIFIED, NO PAIN PRESENT: ICD-10-PCS | Mod: HCNC,S$GLB,, | Performed by: INTERNAL MEDICINE

## 2020-08-31 PROCEDURE — 85025 COMPLETE CBC W/AUTO DIFF WBC: CPT | Mod: HCNC

## 2020-08-31 PROCEDURE — 83036 HEMOGLOBIN GLYCOSYLATED A1C: CPT | Mod: HCNC

## 2020-08-31 RX ORDER — LOSARTAN POTASSIUM 25 MG/1
25 TABLET ORAL DAILY
Qty: 90 TABLET | Refills: 3 | Status: SHIPPED | OUTPATIENT
Start: 2020-08-31 | End: 2021-07-08 | Stop reason: SDUPTHER

## 2020-08-31 RX ORDER — TRIAMTERENE AND HYDROCHLOROTHIAZIDE 37.5; 25 MG/1; MG/1
1 CAPSULE ORAL DAILY
Qty: 90 CAPSULE | Refills: 3 | Status: SHIPPED | OUTPATIENT
Start: 2020-08-31 | End: 2021-07-08 | Stop reason: SDUPTHER

## 2020-08-31 NOTE — PROGRESS NOTES
Subjective:       Patient ID: Kelley Cruz is a 80 y.o. female.    Chief Complaint: Annual Exam    HPIPt has been feeling well - no CP or SOB.  Not exercising as much - declines TKR. Knee not bothering her as much.  Review of Systems   Respiratory: Negative for shortness of breath (PND or orthopnea).    Cardiovascular: Negative for chest pain (arm pain or jaw pain).   Gastrointestinal: Negative for abdominal pain, diarrhea, nausea and vomiting.   Genitourinary: Negative for dysuria.   Neurological: Negative for seizures, syncope and headaches.       Objective:      Physical Exam  Constitutional:       General: She is not in acute distress.     Appearance: She is well-developed.   Neck:      Musculoskeletal: Neck supple.      Thyroid: No thyromegaly.      Vascular: No JVD.      Comments: Soft LN in anterior cervical region - she reports it swells when she has an infection.  Cardiovascular:      Rate and Rhythm: Normal rate and regular rhythm.      Heart sounds: Normal heart sounds. No murmur. No friction rub. No gallop.    Pulmonary:      Effort: Pulmonary effort is normal.      Breath sounds: Normal breath sounds. No wheezing or rales.   Abdominal:      General: Bowel sounds are normal. There is no distension.      Palpations: Abdomen is soft. There is no mass.      Tenderness: There is no abdominal tenderness. There is no guarding or rebound.   Lymphadenopathy:      Cervical: No cervical adenopathy.   Skin:     General: Skin is warm and dry.   Neurological:      Mental Status: She is alert and oriented to person, place, and time.      Deep Tendon Reflexes: Reflexes are normal and symmetric.   Psychiatric:         Behavior: Behavior normal.         Thought Content: Thought content normal.         Judgment: Judgment normal.         Assessment:       1. Essential hypertension    2. Hyperlipidemia, unspecified hyperlipidemia type    3. Hyperglycemia    4. Mild vitamin D deficiency    5. Osteopenia,  unspecified location    6. Screening for colon cancer    7. Age-related osteoporosis without current pathological fracture     8. Adenopathy        Plan:   Essential hypertension  -     Urinalysis  -     Microalbumin/creatinine urine ratio  -     CBC auto differential; Future; Expected date: 08/31/2020  -     Comprehensive metabolic panel; Future; Expected date: 08/31/2020  -     TSH; Future; Expected date: 08/31/2020  Controlled - continue current meds    Hyperlipidemia, unspecified hyperlipidemia type  -     Lipid Panel; Future; Expected date: 08/31/2020    Hyperglycemia  -     Hemoglobin A1C; Future; Expected date: 08/31/2020    Mild vitamin D deficiency  -     Vitamin D; Future; Expected date: 08/31/2020    Osteopenia, unspecified location  -     DXA Bone Density Spine And Hip; Future; Expected date: 08/31/2020    Screening for colon cancer  -     Fecal Immunochemical Test (iFOBT); Future; Expected date: 08/31/2020    Age-related osteoporosis without current pathological fracture   -     DXA Bone Density Spine And Hip; Future; Expected date: 08/31/2020    Adenopathy  -     CT Soft Tissue Neck With Contrast; Future; Expected date: 08/31/2020    Other orders  -     triamterene-hydrochlorothiazide 37.5-25 mg (DYAZIDE) 37.5-25 mg per capsule; Take 1 capsule by mouth once daily.  Dispense: 90 capsule; Refill: 3  -     losartan (COZAAR) 25 MG tablet; Take 1 tablet (25 mg total) by mouth once daily.  Dispense: 90 tablet; Refill: 3

## 2020-09-01 LAB
25(OH)D3+25(OH)D2 SERPL-MCNC: 31 NG/ML (ref 30–96)
ALBUMIN SERPL BCP-MCNC: 4.1 G/DL (ref 3.5–5.2)
ALBUMIN/CREAT UR: 3.9 UG/MG (ref 0–30)
ALP SERPL-CCNC: 72 U/L (ref 55–135)
ALT SERPL W/O P-5'-P-CCNC: 14 U/L (ref 10–44)
ANION GAP SERPL CALC-SCNC: 9 MMOL/L (ref 8–16)
AST SERPL-CCNC: 19 U/L (ref 10–40)
BACTERIA #/AREA URNS AUTO: NORMAL /HPF
BILIRUB SERPL-MCNC: 0.3 MG/DL (ref 0.1–1)
BILIRUB UR QL STRIP: NEGATIVE
BUN SERPL-MCNC: 18 MG/DL (ref 8–23)
CALCIUM SERPL-MCNC: 10.2 MG/DL (ref 8.7–10.5)
CHLORIDE SERPL-SCNC: 99 MMOL/L (ref 95–110)
CHOLEST SERPL-MCNC: 203 MG/DL (ref 120–199)
CHOLEST/HDLC SERPL: 3 {RATIO} (ref 2–5)
CLARITY UR REFRACT.AUTO: ABNORMAL
CO2 SERPL-SCNC: 28 MMOL/L (ref 23–29)
COLOR UR AUTO: YELLOW
CREAT SERPL-MCNC: 1 MG/DL (ref 0.5–1.4)
CREAT UR-MCNC: 103 MG/DL (ref 15–325)
EST. GFR  (AFRICAN AMERICAN): >60 ML/MIN/1.73 M^2
EST. GFR  (NON AFRICAN AMERICAN): 53.7 ML/MIN/1.73 M^2
ESTIMATED AVG GLUCOSE: 128 MG/DL (ref 68–131)
GLUCOSE SERPL-MCNC: 91 MG/DL (ref 70–110)
GLUCOSE UR QL STRIP: NEGATIVE
HBA1C MFR BLD HPLC: 6.1 % (ref 4–5.6)
HDLC SERPL-MCNC: 67 MG/DL (ref 40–75)
HDLC SERPL: 33 % (ref 20–50)
HGB UR QL STRIP: NEGATIVE
KETONES UR QL STRIP: NEGATIVE
LDLC SERPL CALC-MCNC: 107.2 MG/DL (ref 63–159)
LEUKOCYTE ESTERASE UR QL STRIP: ABNORMAL
MICROALBUMIN UR DL<=1MG/L-MCNC: 4 UG/ML
MICROSCOPIC COMMENT: NORMAL
NITRITE UR QL STRIP: NEGATIVE
NONHDLC SERPL-MCNC: 136 MG/DL
PH UR STRIP: 5 [PH] (ref 5–8)
POTASSIUM SERPL-SCNC: 3.9 MMOL/L (ref 3.5–5.1)
PROT SERPL-MCNC: 7.8 G/DL (ref 6–8.4)
PROT UR QL STRIP: NEGATIVE
RBC #/AREA URNS AUTO: 1 /HPF (ref 0–4)
SODIUM SERPL-SCNC: 136 MMOL/L (ref 136–145)
SP GR UR STRIP: 1.02 (ref 1–1.03)
SQUAMOUS #/AREA URNS AUTO: 0 /HPF
TRIGL SERPL-MCNC: 144 MG/DL (ref 30–150)
TSH SERPL DL<=0.005 MIU/L-ACNC: 1.37 UIU/ML (ref 0.4–4)
URN SPEC COLLECT METH UR: ABNORMAL
WBC #/AREA URNS AUTO: 1 /HPF (ref 0–5)

## 2020-09-03 ENCOUNTER — HOSPITAL ENCOUNTER (OUTPATIENT)
Dept: RADIOLOGY | Facility: OTHER | Age: 80
Discharge: HOME | End: 2020-09-03
Attending: INTERNAL MEDICINE
Payer: MEDICARE

## 2020-09-03 DIAGNOSIS — R59.9 ADENOPATHY: ICD-10-CM

## 2020-09-03 PROCEDURE — 70491 CT SOFT TISSUE NECK WITH CONTRAST: ICD-10-PCS | Mod: 26,HCNC,, | Performed by: RADIOLOGY

## 2020-09-03 PROCEDURE — 70491 CT SOFT TISSUE NECK W/DYE: CPT | Mod: 26,HCNC,, | Performed by: RADIOLOGY

## 2020-09-03 PROCEDURE — 70491 CT SOFT TISSUE NECK W/DYE: CPT | Mod: TC,HCNC

## 2020-09-03 PROCEDURE — 25500020 PHARM REV CODE 255: Mod: HCNC | Performed by: INTERNAL MEDICINE

## 2020-09-03 RX ADMIN — IOHEXOL 75 ML: 350 INJECTION, SOLUTION INTRAVENOUS at 01:09

## 2020-09-29 ENCOUNTER — PATIENT MESSAGE (OUTPATIENT)
Dept: OTHER | Facility: OTHER | Age: 80
End: 2020-09-29

## 2020-11-06 ENCOUNTER — PES CALL (OUTPATIENT)
Dept: ADMINISTRATIVE | Facility: CLINIC | Age: 80
End: 2020-11-06

## 2020-11-20 ENCOUNTER — OFFICE VISIT (OUTPATIENT)
Dept: INTERNAL MEDICINE | Facility: CLINIC | Age: 80
End: 2020-11-20
Payer: MEDICARE

## 2020-11-20 VITALS
HEART RATE: 75 BPM | BODY MASS INDEX: 31.63 KG/M2 | SYSTOLIC BLOOD PRESSURE: 124 MMHG | OXYGEN SATURATION: 97 % | WEIGHT: 178.56 LBS | DIASTOLIC BLOOD PRESSURE: 60 MMHG

## 2020-11-20 DIAGNOSIS — M54.31 RIGHT SIDED SCIATICA: Primary | ICD-10-CM

## 2020-11-20 PROCEDURE — 99213 OFFICE O/P EST LOW 20 MIN: CPT | Mod: HCNC,S$GLB,, | Performed by: INTERNAL MEDICINE

## 2020-11-20 PROCEDURE — 1159F PR MEDICATION LIST DOCUMENTED IN MEDICAL RECORD: ICD-10-PCS | Mod: HCNC,S$GLB,, | Performed by: INTERNAL MEDICINE

## 2020-11-20 PROCEDURE — 3074F PR MOST RECENT SYSTOLIC BLOOD PRESSURE < 130 MM HG: ICD-10-PCS | Mod: HCNC,CPTII,S$GLB, | Performed by: INTERNAL MEDICINE

## 2020-11-20 PROCEDURE — 99999 PR PBB SHADOW E&M-EST. PATIENT-LVL III: ICD-10-PCS | Mod: PBBFAC,HCNC,, | Performed by: INTERNAL MEDICINE

## 2020-11-20 PROCEDURE — 99999 PR PBB SHADOW E&M-EST. PATIENT-LVL III: CPT | Mod: PBBFAC,HCNC,, | Performed by: INTERNAL MEDICINE

## 2020-11-20 PROCEDURE — 3288F PR FALLS RISK ASSESSMENT DOCUMENTED: ICD-10-PCS | Mod: HCNC,CPTII,S$GLB, | Performed by: INTERNAL MEDICINE

## 2020-11-20 PROCEDURE — 1101F PT FALLS ASSESS-DOCD LE1/YR: CPT | Mod: HCNC,CPTII,S$GLB, | Performed by: INTERNAL MEDICINE

## 2020-11-20 PROCEDURE — 3078F PR MOST RECENT DIASTOLIC BLOOD PRESSURE < 80 MM HG: ICD-10-PCS | Mod: HCNC,CPTII,S$GLB, | Performed by: INTERNAL MEDICINE

## 2020-11-20 PROCEDURE — 1125F PR PAIN SEVERITY QUANTIFIED, PAIN PRESENT: ICD-10-PCS | Mod: HCNC,S$GLB,, | Performed by: INTERNAL MEDICINE

## 2020-11-20 PROCEDURE — 1159F MED LIST DOCD IN RCRD: CPT | Mod: HCNC,S$GLB,, | Performed by: INTERNAL MEDICINE

## 2020-11-20 PROCEDURE — 1125F AMNT PAIN NOTED PAIN PRSNT: CPT | Mod: HCNC,S$GLB,, | Performed by: INTERNAL MEDICINE

## 2020-11-20 PROCEDURE — 1101F PR PT FALLS ASSESS DOC 0-1 FALLS W/OUT INJ PAST YR: ICD-10-PCS | Mod: HCNC,CPTII,S$GLB, | Performed by: INTERNAL MEDICINE

## 2020-11-20 PROCEDURE — 99213 PR OFFICE/OUTPT VISIT, EST, LEVL III, 20-29 MIN: ICD-10-PCS | Mod: HCNC,S$GLB,, | Performed by: INTERNAL MEDICINE

## 2020-11-20 PROCEDURE — 3078F DIAST BP <80 MM HG: CPT | Mod: HCNC,CPTII,S$GLB, | Performed by: INTERNAL MEDICINE

## 2020-11-20 PROCEDURE — 3288F FALL RISK ASSESSMENT DOCD: CPT | Mod: HCNC,CPTII,S$GLB, | Performed by: INTERNAL MEDICINE

## 2020-11-20 PROCEDURE — 3074F SYST BP LT 130 MM HG: CPT | Mod: HCNC,CPTII,S$GLB, | Performed by: INTERNAL MEDICINE

## 2020-11-20 RX ORDER — TIZANIDINE 2 MG/1
4 TABLET ORAL EVERY 8 HOURS PRN
Qty: 40 TABLET | Refills: 1 | Status: SHIPPED | OUTPATIENT
Start: 2020-11-20 | End: 2020-11-30

## 2020-11-20 RX ORDER — MELOXICAM 7.5 MG/1
7.5 TABLET ORAL DAILY
Qty: 30 TABLET | Refills: 0 | Status: SHIPPED | OUTPATIENT
Start: 2020-11-20 | End: 2020-12-17

## 2020-11-20 NOTE — PROGRESS NOTES
Subjective:       Patient ID: Kelley Cruz is a 80 y.o. female.    Chief Complaint: Hip Pain    80 year old lady complains of right buttock pain with some radiation laterally and shortly down lateral thigh.  Hurts more with weight bearing.  Sometimes feels she has a jaymie horse in her buttock    Review of Systems   Constitutional: Negative for activity change, chills, fatigue and fever.   HENT: Negative for congestion, ear pain, nosebleeds, postnasal drip, sinus pressure and sore throat.    Eyes: Negative.  Negative for visual disturbance.   Respiratory: Negative for cough, chest tightness, shortness of breath and wheezing.    Cardiovascular: Negative for chest pain.   Gastrointestinal: Negative for abdominal pain, diarrhea, nausea and vomiting.   Genitourinary: Negative for difficulty urinating, dysuria, frequency and urgency.   Musculoskeletal: Negative for arthralgias and neck stiffness.   Skin: Negative for rash.   Neurological: Negative for dizziness, weakness and headaches.   Psychiatric/Behavioral: Negative for sleep disturbance. The patient is not nervous/anxious.        Objective:      Physical Exam  Musculoskeletal:        Back:          Assessment:       1. Right sided sciatica        Plan:   Kelley was seen today for hip pain.    Diagnoses and all orders for this visit:    Right sided sciatica  -     Ambulatory referral/consult to Physical/Occupational Therapy; Future  -     Ambulatory referral/consult to [facility]; Future    Other orders  -     meloxicam (MOBIC) 7.5 MG tablet; Take 1 tablet (7.5 mg total) by mouth once daily.  -     tiZANidine (ZANAFLEX) 2 MG tablet; Take 2 tablets (4 mg total) by mouth every 8 (eight) hours as needed.

## 2020-11-25 ENCOUNTER — CLINICAL SUPPORT (OUTPATIENT)
Dept: REHABILITATION | Facility: HOSPITAL | Age: 80
End: 2020-11-25
Payer: MEDICARE

## 2020-11-25 DIAGNOSIS — R29.898 DECREASED STRENGTH OF LOWER EXTREMITY: ICD-10-CM

## 2020-11-25 DIAGNOSIS — M54.31 RIGHT SIDED SCIATICA: ICD-10-CM

## 2020-11-25 DIAGNOSIS — M25.652 DECREASED RANGE OF MOTION OF BOTH HIPS: ICD-10-CM

## 2020-11-25 DIAGNOSIS — M25.651 DECREASED RANGE OF MOTION OF BOTH HIPS: ICD-10-CM

## 2020-11-25 PROCEDURE — 97161 PT EVAL LOW COMPLEX 20 MIN: CPT | Mod: HCNC,PO

## 2020-11-25 NOTE — PLAN OF CARE
[facility]  Physical Therapy Initial Evaluation    Date: 11/25/2020   Name: Kelley Cruz  Clinic Number: [id number]    Therapy Diagnosis:   Encounter Diagnosis   Name Primary?    Right sided sciatica      Physician: Nette Garrett MD    Physician Orders: PT Eval and Treat   Medical Diagnosis from Referral: M54.31 (ICD-10-CM) - Right sided sciatica   Evaluation Date: 11/25/2020  Authorization Period Expiration: 11/20/2021   Plan of Care Expiration: 1/22/2020  Visit # / Visits authorized: 1/ 1    Time In: 1043 (late to appt)  Time Out: 1115  Total Appointment Time (timed & untimed codes): 32 minutes    Precautions: Standard    Subjective   Date of onset: about 1 week ago  History of current condition - Kelley reports: recent catching in right hip with radicular symptoms to knee. Taking tylenol and recent medication prescribed by MD which has helped. She states that when she doesn't slouch, it feels better.       Medical History:   Past Medical History:   Diagnosis Date    Cataract     Hypertension     Keloid cicatrix     Osteoporosis        Surgical History:   Kelley Cruz  has a past surgical history that includes Hysterectomy and Open reduction and internal fixation (ORIF) of fracture of distal radius (Right, 1/23/2019).    Medications:   Kelley has a current medication list which includes the following prescription(s): ascorbic acid (vitamin c), cephalexin, losartan, meloxicam, meloxicam, multivitamin, tizanidine, triamterene-hydrochlorothiazide 37.5-25 mg, and varicella-zoster ge-as01b (pf).    Allergies:   Review of patient's allergies indicates:   Allergen Reactions    Codeine      Other reaction(s): Headache    Pcn  [penicillins]      Other reaction(s): Nausea        Imaging: see patient chart    Prior Therapy: yes  Social History: upstairs in 2 story house, lives alone  Occupation: retired  Prior Level of Function: independent with all ADLs and mobility    Current Level of Function: she sometimes goes down steps backwards and nonreceiprocal pattern ascending stairs    Pain:  Current 5/10, worst 6/10, best 0/10   Location: right hip, posterior and lateral  Description: Aching and Sharp  Aggravating Factors: Sitting, Standing, Walking and Morning  Easing Factors: pain medication, heating pad and rest    Patients goals: to decrease pain and decrease tightness in hip for improved mobility     Objective   Observation: presents to clinic without AD  Abnormal pelvic and sacral alignment     Posture: slouched with moderate forward head posture     Gait: antalgic gait pattern, right hip drop with decreased hip extension    Active/Passive Hip ROM: (measured in degrees)    RLE LLE   Extension 5 0     Sensation: Intact    Lower Extremity Strength: (graded 1-5 out of 5)    RLE LLE   Hip flexion: 4-/5 3+/5, right trunk lean with patient reporting pressure on right hip   Knee extension: 5/5 5/5   Ankle dorsiflexion: 5/5 5/5   Posterior fibers of Gluteus medius 3-/5 3+/5   Glute max: 3-/5 3/5   Knee flexion: 5/5 5/5   Ankle plantarflexion: 5/5 5/5     Special Tests:   · Right FADIR: positive   · Right ELVIA: negative   · Right SLR: negative  · Left FADIR: negative     Hamstring length at 90/90:  · Right: 40 degrees      Palpation: +TTP right piriformis, gluteus medius, gluteus minimus    Bed mobility:  · Sit>supine: need to use UEs to lift leg onto mat    Limitation/Restriction for FOTO hip Survey     FOTO documents not administered at initial evaluation.           TREATMENT   Home Exercises and Patient Education Provided    Education provided:   - PT role, POC, hip anatomy     Written Home Exercises Provided: no. Exercises will be administered with handout next session.    Assessment   Kelley is a 80 y.o. female referred to outpatient Physical Therapy with a medical diagnosis of M54.31 (ICD-10-CM) - Right sided sciatica. Patient presents with antalgic gait pattern, decreased hip  ROM, and decreased hip strength. Positive FADIR indicating possible hip impingement. She would benefit from physical therapy to address deficits and return patient to daily activities without pain.    Patient prognosis is Good.   Patient will benefit from skilled outpatient Physical Therapy to address the deficits stated above and in the chart below, provide patient /family education, and to maximize patientt's level of independence.     Plan of care discussed with patient: Yes  Patient's spiritual, cultural and educational needs considered and patient is agreeable to the plan of care and goals as stated below:     Anticipated Barriers for therapy: none anticipated     Medical Necessity is demonstrated by the following  History  Co-morbidities and personal factors that may impact the plan of care Co-morbidities:   advanced age and HTN    Personal Factors:   no deficits     low   Examination  Body Structures and Functions, activity limitations and participation restrictions that may impact the plan of care Body Regions:   lower extremities    Body Systems:    ROM  strength    Participation Restrictions:   None     Activity limitations:   Learning and applying knowledge  no deficits    General Tasks and Commands  no deficits    Communication  no deficits    Mobility  lifting and carrying objects  walking  driving (bike, car, motorcycle)    Self care  no deficits    Domestic Life  doing house work (cleaning house, washing dishes, laundry)    Interactions/Relationships  no deficits    Life Areas  no deficits    Community and Social Life  no deficits         moderate   Clinical Presentation stable and uncomplicated low   Decision Making/ Complexity Score: low     Goals:  Short Term Goals (4 Weeks):   1. Pt will be compliant with HEP to supplement PT in restoring pain free function.  2. Pt will increase bilateral hip extension range of motion by 5 degrees in order to improve patient's mobility for normal movement  patterns.  3. Pt will demonstrate increased strength of bilateral LEs by 1 muscle grade in order to improve strength for functional tasks.  4. Subjective reports of right hip pain as </= 5/10 during all standing/ambulatory activities.    Long Term Goals (8 Weeks):  1. Pt will increase bilateral hip extension range of motion by 10 degrees in order to improve patient's mobility for normal movement patterns.  2. Pt will demonstrate increased strength of bilateral LEs to >/= 4+/5 in order to improve strength for functional tasks.  3. Pt improve impaired hip ROM to WNL in all planes to improve mobility for normal movement patterns.   4. Subjective reports of right hip pain as </=1/10 during all standing/ambulatory activities.        Plan   Plan of care Certification: 11/25/2020 to 1/22/2020.    Outpatient Physical Therapy 2 times weekly for 8 weeks to include the following interventions: Manual Therapy, Moist Heat/ Ice, Neuromuscular Re-ed, Patient Education, Therapeutic Activites, Therapeutic Exercise and Dry Needling.     Mary Murphy, PT

## 2020-11-30 ENCOUNTER — TELEPHONE (OUTPATIENT)
Dept: INTERNAL MEDICINE | Facility: CLINIC | Age: 80
End: 2020-11-30

## 2020-11-30 NOTE — TELEPHONE ENCOUNTER
----- Message from Marisol Duran sent at 11/30/2020  9:00 AM CST -----  Contact: Daughter(Saniya Dewitt)[phone]  Would like to get medical advice.    Comments:  Calling to speak with the nurse regarding if the provider can fill out FMLA paperwork. Saniya states she is currently caring for her mom. Saniya would like to know what's the best way for the provider to receive the paperwork. Saniya is requesting a call back regarding message.

## 2020-12-01 ENCOUNTER — TELEPHONE (OUTPATIENT)
Dept: INTERNAL MEDICINE | Facility: CLINIC | Age: 80
End: 2020-12-01

## 2020-12-01 NOTE — TELEPHONE ENCOUNTER
----- Message from Ct Henderson sent at 12/1/2020 11:26 AM CST -----  Regarding: Saniya (daughter)- [phone]  Saniya is requesting a callback regarding her mother, the pt.  She dropped some FMLA papers off yesterday at the doctor's office.  She would like to be advised on the time frame, she would like to be advised on how long she would need to stay and help her mother.  She would like this information because her job needs to know.    Callback number: Saniya (daughter)- [phone]

## 2020-12-09 ENCOUNTER — CLINICAL SUPPORT (OUTPATIENT)
Dept: REHABILITATION | Facility: HOSPITAL | Age: 80
End: 2020-12-09
Payer: MEDICARE

## 2020-12-09 DIAGNOSIS — M25.651 DECREASED RANGE OF MOTION OF BOTH HIPS: ICD-10-CM

## 2020-12-09 DIAGNOSIS — M25.652 DECREASED RANGE OF MOTION OF BOTH HIPS: ICD-10-CM

## 2020-12-09 DIAGNOSIS — R29.898 DECREASED STRENGTH OF LOWER EXTREMITY: ICD-10-CM

## 2020-12-09 PROCEDURE — 97110 THERAPEUTIC EXERCISES: CPT | Mod: HCNC,PO

## 2020-12-09 PROCEDURE — 97140 MANUAL THERAPY 1/> REGIONS: CPT | Mod: HCNC,PO

## 2020-12-09 NOTE — PROGRESS NOTES
"  Physical Therapy Treatment Note     Name: Kelley Patehn  Clinic Number: [id number]    Therapy Diagnosis:   Encounter Diagnoses   Name Primary?    Decreased range of motion of both hips     Decreased strength of lower extremity      Physician: Nette Garrett MD    Visit Date: 12/9/2020    Physician Orders: PT Eval and Treat   Medical Diagnosis from Referral: M54.31 (ICD-10-CM) - Right sided sciatica   Evaluation Date: 11/25/2020  Authorization Period Expiration: 11/20/2021   Plan of Care Expiration: 1/22/2020  Visit # / Visits authorized: 1/ 1      Time In: 1330  Time Out: 1415  Total Billable Time: 45 minutes  1 MT, 2 TE  Precautions: Standard    Subjective     Pt reports: Notes some radicular sx down upper R hip and thigh as well as stiffness in R side of low back "which is normal for me.".  She was not given home exercise program yet. Prescribed this date and given handout.  Response to previous treatment: no change  Functional change: none    Pain: 10/10 "earlier today, its less right now."  Location: right low back and R hip     Objective     Kelley received therapeutic exercises to develop ROM and flexibility for 30 minutes including:  Standing HF stretch using 1-2 steps as able 3x30- added to HEP  Supine HSS using strap 3x30- added to HEP  Supine HF stretch hanging off the table 3x30- added to HEP  Slantboard 3x30    Kelley received the following manual therapy techniques: Joint mobilizations and Soft tissue Mobilization were applied to the: R ant and lateral thigh for 10 minutes, including:  STM using stick to ant and lateral thigh  Grade 1-2 R hip distraction at ankle per pt tolerance.       Kelley received cold pack for 10 minutes to R hip/low back area to decrease pain and inflammation.      Home Exercises Provided and Patient Education Provided     Education provided:   - cueing and demo for all activities performed. Rationale behind interventions and pt's diagnosis/current condition. "     Written Home Exercises Provided: yes.  Exercises were reviewed and Kelley was able to demonstrate them prior to the end of the session.  Kelley demonstrated good  understanding of the education provided.     See EMR under Patient Instructions for exercises provided 12/9/2020.    Assessment     Pt with fair tolerance of new stretches and exercises done this date. She displays significant limitations in her R LE functional strength and flexibility. Noted her R thigh area feeling less sore than when she arrived for session.     Kelley is progressing well towards her goals.   Pt prognosis is Good.     Pt will continue to benefit from skilled outpatient physical therapy to address the deficits listed in the problem list box on initial evaluation, provide pt/family education and to maximize pt's level of independence in the home and community environment.     Pt's spiritual, cultural and educational needs considered and pt agreeable to plan of care and goals.     Anticipated barriers to physical therapy: none    Goals:  Short Term Goals (4 Weeks):   1. Pt will be compliant with HEP to supplement PT in restoring pain free function.  2. Pt will increase bilateral hip extension range of motion by 5 degrees in order to improve patient's mobility for normal movement patterns.  3. Pt will demonstrate increased strength of bilateral LEs by 1 muscle grade in order to improve strength for functional tasks.  4. Subjective reports of right hip pain as </= 5/10 during all standing/ambulatory activities.     Long Term Goals (8 Weeks):  1. Pt will increase bilateral hip extension range of motion by 10 degrees in order to improve patient's mobility for normal movement patterns.  2. Pt will demonstrate increased strength of bilateral LEs to >/= 4+/5 in order to improve strength for functional tasks.  3. Pt improve impaired hip ROM to WNL in all planes to improve mobility for normal movement patterns.   4. Subjective reports of right hip  pain as </=1/10 during all standing/ambulatory activities.           Plan   Plan of care Certification: 11/25/2020 to 1/22/2020.     Outpatient Physical Therapy 2 times weekly for 8 weeks to include the following interventions: Manual Therapy, Moist Heat/ Ice, Neuromuscular Re-ed, Patient Education, Therapeutic Activites, Therapeutic Exercise and Dry Needling.       Leslee Maddox, PT

## 2020-12-11 ENCOUNTER — TELEPHONE (OUTPATIENT)
Dept: INTERNAL MEDICINE | Facility: CLINIC | Age: 80
End: 2020-12-11

## 2020-12-11 ENCOUNTER — CLINICAL SUPPORT (OUTPATIENT)
Dept: REHABILITATION | Facility: HOSPITAL | Age: 80
End: 2020-12-11
Payer: MEDICARE

## 2020-12-11 ENCOUNTER — PATIENT MESSAGE (OUTPATIENT)
Dept: OTHER | Facility: OTHER | Age: 80
End: 2020-12-11

## 2020-12-11 DIAGNOSIS — M25.652 DECREASED RANGE OF MOTION OF BOTH HIPS: ICD-10-CM

## 2020-12-11 DIAGNOSIS — M25.651 DECREASED RANGE OF MOTION OF BOTH HIPS: ICD-10-CM

## 2020-12-11 DIAGNOSIS — R29.898 DECREASED STRENGTH OF LOWER EXTREMITY: ICD-10-CM

## 2020-12-11 PROCEDURE — 97140 MANUAL THERAPY 1/> REGIONS: CPT | Mod: HCNC,PO,CQ

## 2020-12-11 PROCEDURE — 97110 THERAPEUTIC EXERCISES: CPT | Mod: HCNC,PO,CQ

## 2020-12-14 ENCOUNTER — CLINICAL SUPPORT (OUTPATIENT)
Dept: REHABILITATION | Facility: HOSPITAL | Age: 80
End: 2020-12-14
Payer: MEDICARE

## 2020-12-14 DIAGNOSIS — M25.652 DECREASED RANGE OF MOTION OF BOTH HIPS: ICD-10-CM

## 2020-12-14 DIAGNOSIS — R29.898 DECREASED STRENGTH OF LOWER EXTREMITY: ICD-10-CM

## 2020-12-14 DIAGNOSIS — M25.651 DECREASED RANGE OF MOTION OF BOTH HIPS: ICD-10-CM

## 2020-12-14 PROCEDURE — 97110 THERAPEUTIC EXERCISES: CPT | Mod: HCNC,PO,CQ

## 2020-12-14 NOTE — PROGRESS NOTES
"  Physical Therapy Treatment Note     Name: Kelley Perryjohn  Clinic Number: [id number]    Therapy Diagnosis:   No diagnosis found.  Physician: Nette Garrett MD    Visit Date: 12/14/2020    Physician Orders: PT Eval and Treat   Medical Diagnosis from Referral: M54.31 (ICD-10-CM) - Right sided sciatica   Evaluation Date: 11/25/2020  Authorization Period Expiration: 06/11/2021   Plan of Care Expiration: 1/22/2020  Visit # / Visits authorized: 3/ 20      Time In: 9:00 am  Time Out: 9:35 am  Total Billable Time: 30 minutes  1 MT, 2 TE  Precautions: Standard    Subjective     Pt reports: that her low back and R hip are hurting again and feels that she needs to go back to see MD.  She was not compliant   Response to previous treatment: no change  Functional change: none    Pain: 7/10   Location: right low back and R hip     Objective     Kelley received therapeutic exercises to develop ROM and flexibility for 30 minutes including:      LTR 2 min  PPT x20 reps 3sec hold  Hip add ball squeeze with glut set in hooklying 2x10 with 3 sec hold  Hip abd with orange TB with glut set in hooklying 2x10 with 3 sec hold  Supine clamshells with OTB x15 reps with 3" hold  Supine butterfly groin stretch 2x30 sec  Supine hip flexor stretch 2x30 sec  Piriformis stretch 2x30 sec    Kelley received the following manual therapy techniques: Joint mobilizations and Soft tissue Mobilization were applied to the: R ant and lateral thigh for 5 minutes, including:   STM to R gluteal muscles/piriformis with patient in side lying and pillow between knees - np  MET to correct R anterior rotation of pelvis - np  LE long axis distraction of each LE        Home Exercises Provided and Patient Education Provided     Education provided:   - cueing and demo for all activities performed. Rationale behind interventions and pt's diagnosis/current condition.     Written Home Exercises Provided: yes.  Exercises were reviewed and Kelley was able to demonstrate " them prior to the end of the session.  Kelley demonstrated good  understanding of the education provided.     See EMR under Patient Instructions for exercises provided 12/9/2020.    Assessment   Patient presents significant low back and R hip pain and LE with antalgic gait with decreased stance time on RLE upon arrival. She was instructed on and able to tolerate hip and low back mobility, hip flexibility, and core/hip stability exercises as noted. Will initiate an HEP during future tx visits. Continue progressing as tolerated.     Kelley is progressing well towards her goals.   Pt prognosis is Good.     Pt will continue to benefit from skilled outpatient physical therapy to address the deficits listed in the problem list box on initial evaluation, provide pt/family education and to maximize pt's level of independence in the home and community environment.     Pt's spiritual, cultural and educational needs considered and pt agreeable to plan of care and goals.     Anticipated barriers to physical therapy: none    Goals:  Short Term Goals (4 Weeks):   1. Pt will be compliant with HEP to supplement PT in restoring pain free function.  2. Pt will increase bilateral hip extension range of motion by 5 degrees in order to improve patient's mobility for normal movement patterns.  3. Pt will demonstrate increased strength of bilateral LEs by 1 muscle grade in order to improve strength for functional tasks.  4. Subjective reports of right hip pain as </= 5/10 during all standing/ambulatory activities.     Long Term Goals (8 Weeks):  1. Pt will increase bilateral hip extension range of motion by 10 degrees in order to improve patient's mobility for normal movement patterns.  2. Pt will demonstrate increased strength of bilateral LEs to >/= 4+/5 in order to improve strength for functional tasks.  3. Pt improve impaired hip ROM to WNL in all planes to improve mobility for normal movement patterns.   4. Subjective reports of right  hip pain as </=1/10 during all standing/ambulatory activities.           Plan   Plan of care Certification: 11/25/2020 to 1/22/2020.     Outpatient Physical Therapy 2 times weekly for 8 weeks to include the following interventions: Manual Therapy, Moist Heat/ Ice, Neuromuscular Re-ed, Patient Education, Therapeutic Activites, Therapeutic Exercise and Dry Needling.     Assess pelvis for rotation and address if appropriate, continue progressing with core strength and stability as tolerated.     Fco Quarles, PTA

## 2020-12-15 ENCOUNTER — TELEPHONE (OUTPATIENT)
Dept: INTERNAL MEDICINE | Facility: CLINIC | Age: 80
End: 2020-12-15

## 2020-12-17 ENCOUNTER — CLINICAL SUPPORT (OUTPATIENT)
Dept: REHABILITATION | Facility: HOSPITAL | Age: 80
End: 2020-12-17
Payer: MEDICARE

## 2020-12-17 DIAGNOSIS — M25.652 DECREASED RANGE OF MOTION OF BOTH HIPS: Primary | ICD-10-CM

## 2020-12-17 DIAGNOSIS — M25.651 DECREASED RANGE OF MOTION OF BOTH HIPS: Primary | ICD-10-CM

## 2020-12-17 DIAGNOSIS — R29.898 DECREASED STRENGTH OF LOWER EXTREMITY: ICD-10-CM

## 2020-12-17 PROCEDURE — 97140 MANUAL THERAPY 1/> REGIONS: CPT | Mod: HCNC,PO

## 2020-12-17 PROCEDURE — 97110 THERAPEUTIC EXERCISES: CPT | Mod: HCNC,PO

## 2020-12-17 NOTE — PROGRESS NOTES
"  Physical Therapy Treatment Note     Name: Kelley Perryjohn  Clinic Number: [id number]    Therapy Diagnosis:   Encounter Diagnoses   Name Primary?    Decreased range of motion of both hips Yes    Decreased strength of lower extremity      Physician: Nette Garrett MD    Visit Date: 12/17/2020    Physician Orders: PT Eval and Treat   Medical Diagnosis from Referral: M54.31 (ICD-10-CM) - Right sided sciatica   Evaluation Date: 11/25/2020  Authorization Period Expiration: 06/11/2021   Plan of Care Expiration: 1/22/2020  Visit # / Visits authorized: 4/ 20      Time In: 10:15 AM  Time Out: 10:55 AM  Total Billable Time: 40 min (TE 2, MT 1)    Precautions: Standard    Subjective     Pt reports: pain was not as severe since last visit. Tuesday she was on her feet a lot so it was bothering her the next day.   She was semi- compliant with her home exercise program.  Response to previous treatment: pain after session but it slowly eased   Functional change: none    Pain: 4-5/10   Location: right low back and R hip     Objective       Bold=performed today     Kelley received therapeutic exercises to develop ROM and flexibility for 25 minutes including:      LTR 2 min  PPT x20 reps 3sec hold  Hip add ball squeeze in hooklying 2x10 with 3 sec hold  Hip abd with orange TB with glut set in hooklying 2x10 with 3 sec hold  Supine clamshells with OTB x15 reps with 3" hold  Supine butterfly groin stretch 2x30 sec  Supine hip flexor stretch 2x30 sec  Modified piriformis stretch 3x30 sec R    Kelley received the following manual therapy techniques: Joint mobilizations and Soft tissue Mobilization were applied to the: R hip, glutes, low back for 15 minutes, including:   STM to R gluteal muscles/piriformis/ right lumbar paraspinals with patient in side lying and pillow between knees   MET to correct R anterior rotation of pelvis - not performed   LE long axis distraction of RLE        Home Exercises Provided and Patient Education " Provided     Education provided:   - exercises, exercise execution  - issued and reviewed HEP with patient     Written Home Exercises Provided: yes.  Exercises were reviewed and Kelley was able to demonstrate them prior to the end of the session.  Kelley demonstrated good  understanding of the education provided.     See EMR under Patient Instructions for exercises provided 12/17/2020.    Assessment       Pt presents to PT with continued right sided low back and hip pain. However, she does have good tolerance to manual therapy interventions today. She was able to perform bolded exercises with no significant adverse effects but cuing is required for proper execution and form. HEP is issued and reviewed with patient today. Progress as marta.     Kelley is progressing well towards her goals.   Pt prognosis is Good.     Pt will continue to benefit from skilled outpatient physical therapy to address the deficits listed in the problem list box on initial evaluation, provide pt/family education and to maximize pt's level of independence in the home and community environment.     Pt's spiritual, cultural and educational needs considered and pt agreeable to plan of care and goals.     Anticipated barriers to physical therapy: none    Goals:  Short Term Goals (4 Weeks):   1. Pt will be compliant with HEP to supplement PT in restoring pain free function.  2. Pt will increase bilateral hip extension range of motion by 5 degrees in order to improve patient's mobility for normal movement patterns.  3. Pt will demonstrate increased strength of bilateral LEs by 1 muscle grade in order to improve strength for functional tasks.  4. Subjective reports of right hip pain as </= 5/10 during all standing/ambulatory activities.     Long Term Goals (8 Weeks):  1. Pt will increase bilateral hip extension range of motion by 10 degrees in order to improve patient's mobility for normal movement patterns.  2. Pt will demonstrate increased strength of  bilateral LEs to >/= 4+/5 in order to improve strength for functional tasks.  3. Pt improve impaired hip ROM to WNL in all planes to improve mobility for normal movement patterns.   4. Subjective reports of right hip pain as </=1/10 during all standing/ambulatory activities.           Plan   Continue per PT POC    Judy Valdovinos, PT

## 2020-12-21 NOTE — PROGRESS NOTES
"  Physical Therapy Treatment Note     Name: Kelley Cruz  Clinic Number: [id number]    Therapy Diagnosis:   Encounter Diagnoses   Name Primary?    Decreased range of motion of both hips Yes    Decreased strength of lower extremity      Physician: Nette Garrett MD    Visit Date: 12/22/2020    Physician Orders: PT Eval and Treat   Medical Diagnosis from Referral: M54.31 (ICD-10-CM) - Right sided sciatica   Evaluation Date: 11/25/2020  Authorization Period Expiration: 06/11/2021   Plan of Care Expiration: 1/22/2020  Visit # / Visits authorized: 5/ 20      Time In: 12:06 PM  Time Out: 12:45 PM  Total Billable Time: 39 min (TE-2, MT-1)    Precautions: Standard    Subjective     Pt reports: she was hurting on Thursday and did not do a while lot that day but the next day it started feeling less pain. It is a little less painful today and she is not feeling the pressure as much in the right side of low back and the right leg is not cramping as much.   She was semi- compliant with her home exercise program. ("I did them most days")  Response to previous treatment: pain after session but it gradually went away; she had some cramping in the right leg because maybe we pulled a little too much on the right leg   Functional change: she thinks that she is not in as much pain    Pain: 3/10   Location: right low back and R hip     Objective       Bold=performed today     Kelley received therapeutic exercises to develop ROM and flexibility for 24 minutes including:      LTR 2 min  PPT 2x10 reps 5sec hold  Hip add ball squeeze in hooklying 3x10 with 3 sec hold  +figure four stretch 2x30" R   Hip abd with orange TB with glut set in hooklying 2x10 with 3 sec hold  Supine clamshells with OTB x15 reps with 3" hold  Supine butterfly groin stretch 2x30 sec  Supine hip flexor stretch 2x30 sec  Modified piriformis stretch 3x30 sec R    Kelley received the following manual therapy techniques: Joint mobilizations and Soft tissue " Mobilization were applied to the: R hip, glutes, low back for 15 minutes, including:   STM to R gluteal muscles/piriformis/ right lumbar paraspinals in prone  Piriformis release/flossing R in prone   Manual right quad stretch in prone   MET to correct R anterior rotation of pelvis - not performed   LE long axis distraction of RLE- not performed         Home Exercises Provided and Patient Education Provided     Education provided:   - exercises, exercise execution      Written Home Exercises Provided: Patient instructed to cont prior HEP.  Exercises were reviewed and Kelley was able to demonstrate them prior to the end of the session.  Kelley demonstrated good  understanding of the education provided.     See EMR under Patient Instructions for exercises provided 12/17/2020.    Assessment     Pt presents to PT with decreased pain overall. She was able to perform bolded exercises with no adverse effects noted. Notes pain 2/10 upon end of session. Progress as marta.     Kelley is progressing well towards her goals.   Pt prognosis is Good.     Pt will continue to benefit from skilled outpatient physical therapy to address the deficits listed in the problem list box on initial evaluation, provide pt/family education and to maximize pt's level of independence in the home and community environment.     Pt's spiritual, cultural and educational needs considered and pt agreeable to plan of care and goals.     Anticipated barriers to physical therapy: none    Goals:  Short Term Goals (4 Weeks):   1. Pt will be compliant with HEP to supplement PT in restoring pain free function.  2. Pt will increase bilateral hip extension range of motion by 5 degrees in order to improve patient's mobility for normal movement patterns.  3. Pt will demonstrate increased strength of bilateral LEs by 1 muscle grade in order to improve strength for functional tasks.  4. Subjective reports of right hip pain as </= 5/10 during all standing/ambulatory  activities.     Long Term Goals (8 Weeks):  1. Pt will increase bilateral hip extension range of motion by 10 degrees in order to improve patient's mobility for normal movement patterns.  2. Pt will demonstrate increased strength of bilateral LEs to >/= 4+/5 in order to improve strength for functional tasks.  3. Pt improve impaired hip ROM to WNL in all planes to improve mobility for normal movement patterns.   4. Subjective reports of right hip pain as </=1/10 during all standing/ambulatory activities.           Plan   Continue per PT POC    Judy Valdovinos, PT

## 2020-12-22 ENCOUNTER — CLINICAL SUPPORT (OUTPATIENT)
Dept: REHABILITATION | Facility: HOSPITAL | Age: 80
End: 2020-12-22
Payer: MEDICARE

## 2020-12-22 DIAGNOSIS — M25.651 DECREASED RANGE OF MOTION OF BOTH HIPS: Primary | ICD-10-CM

## 2020-12-22 DIAGNOSIS — R29.898 DECREASED STRENGTH OF LOWER EXTREMITY: ICD-10-CM

## 2020-12-22 DIAGNOSIS — M25.652 DECREASED RANGE OF MOTION OF BOTH HIPS: Primary | ICD-10-CM

## 2020-12-22 PROCEDURE — 97110 THERAPEUTIC EXERCISES: CPT | Mod: HCNC,PO

## 2020-12-22 PROCEDURE — 97140 MANUAL THERAPY 1/> REGIONS: CPT | Mod: HCNC,PO

## 2020-12-28 ENCOUNTER — CLINICAL SUPPORT (OUTPATIENT)
Dept: REHABILITATION | Facility: HOSPITAL | Age: 80
End: 2020-12-28
Attending: INTERNAL MEDICINE
Payer: MEDICARE

## 2020-12-28 DIAGNOSIS — M25.652 DECREASED RANGE OF MOTION OF BOTH HIPS: ICD-10-CM

## 2020-12-28 DIAGNOSIS — R29.898 DECREASED STRENGTH OF LOWER EXTREMITY: ICD-10-CM

## 2020-12-28 DIAGNOSIS — M25.651 DECREASED RANGE OF MOTION OF BOTH HIPS: ICD-10-CM

## 2020-12-28 PROCEDURE — 97110 THERAPEUTIC EXERCISES: CPT | Mod: HCNC,PO,CQ

## 2020-12-28 NOTE — PROGRESS NOTES
"  Physical Therapy Treatment Note     Name: Kelley Patehn  Clinic Number: [id number]    Therapy Diagnosis:   Encounter Diagnoses   Name Primary?    Decreased range of motion of both hips     Decreased strength of lower extremity      Physician: Nette Garrett MD    Visit Date: 12/28/2020    Physician Orders: PT Eval and Treat   Medical Diagnosis from Referral: M54.31 (ICD-10-CM) - Right sided sciatica   Evaluation Date: 11/25/2020  Authorization Period Expiration: 06/11/2021   Plan of Care Expiration: 1/22/2020  Visit # / Visits authorized: 6/ 20       Time In:  12:00 pm  Time Out: 12:38 pm  Total Billable Time: 38 min (TE-3)    Precautions: Standard    Subjective     Pt reports: the pain is much improved.   She was semi- compliant with her home exercise program.  Response to previous treatment: no adverse effects  Functional change: she thinks that she is not in as much pain    Pain: 2/10   Location: right low back and R hip     Objective       Bold=performed today     Kelley received therapeutic exercises to develop ROM and flexibility for 38 minutes including:      LTR 2 min  PPT 2x10 reps 5sec hold  Hip add ball squeeze in hooklying 3x10 with 3 sec hold  figure four stretch 3x30" R   Hip abd with orange TB with glut set in hooklying 2x10 with 3 sec hold  Supine butterfly groin stretch 3x30 sec  Supine hip flexor stretch with strap 3x30 sec R  Modified piriformis stretch 3x30 sec R  +Bridging with PPT 2x10 reps  +Side lying clamshells 2x10 reps each    Kelley received the following manual therapy techniques: Joint mobilizations and Soft tissue Mobilization were applied to the: R hip, glutes, low back for 00 minutes, including:   STM to R gluteal muscles/piriformis/ right lumbar paraspinals in prone   Piriformis release/flossing R in prone   Manual right quad stretch in prone   MET to correct R anterior rotation of pelvis - not performed   LE long axis distraction of RLE- not performed         Home " Exercises Provided and Patient Education Provided     Education provided:   - exercises, exercise execution      Written Home Exercises Provided: Patient instructed to cont prior HEP.  Exercises were reviewed and Kelley was able to demonstrate them prior to the end of the session.  Kelley demonstrated good  understanding of the education provided.     See EMR under Patient Instructions for exercises provided 12/17/2020.    Assessment     Patient presents with less pain today and was able to tolerate all exercises with no increase in pain noted. She was challenged by new exercises and completed with no pain or adverse effects. Will continue to progress patient as tolerated next session.     Kelley is progressing well towards her goals.   Pt prognosis is Good.     Pt will continue to benefit from skilled outpatient physical therapy to address the deficits listed in the problem list box on initial evaluation, provide pt/family education and to maximize pt's level of independence in the home and community environment.     Pt's spiritual, cultural and educational needs considered and pt agreeable to plan of care and goals.     Anticipated barriers to physical therapy: none    Goals:  Short Term Goals (4 Weeks):   1. Pt will be compliant with HEP to supplement PT in restoring pain free function.  2. Pt will increase bilateral hip extension range of motion by 5 degrees in order to improve patient's mobility for normal movement patterns.  3. Pt will demonstrate increased strength of bilateral LEs by 1 muscle grade in order to improve strength for functional tasks.  4. Subjective reports of right hip pain as </= 5/10 during all standing/ambulatory activities.     Long Term Goals (8 Weeks):  1. Pt will increase bilateral hip extension range of motion by 10 degrees in order to improve patient's mobility for normal movement patterns.  2. Pt will demonstrate increased strength of bilateral LEs to >/= 4+/5 in order to improve  strength for functional tasks.  3. Pt improve impaired hip ROM to WNL in all planes to improve mobility for normal movement patterns.   4. Subjective reports of right hip pain as </=1/10 during all standing/ambulatory activities.           Plan   Continue per PT POC    Vanessa Verma, PTA

## 2020-12-29 NOTE — PROGRESS NOTES
"  Physical Therapy Treatment Note     Name: Kelley Perryjohn  Clinic Number: [id number]    Therapy Diagnosis:   Encounter Diagnoses   Name Primary?    Decreased range of motion of both hips     Decreased strength of lower extremity      Physician: Nette Garrett MD    Visit Date: 12/30/2020    Physician Orders: PT Eval and Treat   Medical Diagnosis from Referral: M54.31 (ICD-10-CM) - Right sided sciatica   Evaluation Date: 11/25/2020  Authorization Period Expiration: 06/11/2021   Plan of Care Expiration: 1/22/2020  Visit # / Visits authorized: 7/ 20        Time In:  9:02 am  Time Out: 9:44 am   Total Billable Time: 42 min (TE-3)    Precautions: Standard    Subjective     Pt reports: she had some muscle soreness after last session, but not having the bad pain anymore  She was semi- compliant with her home exercise program.  Response to previous treatment: no adverse effects, mild muscle soreness  Functional change: she thinks that she is not in as much pain    Pain: 2/10   Location: right low back and R hip     Objective       Bold=performed today     Kelley received therapeutic exercises to develop ROM and flexibility for 42 minutes including:      LTR 2 min  PPT 2x10 reps 5sec hold  Hip add ball squeeze in hooklying 3x10 with 3 sec hold  figure four stretch 3x30" R   Hip abd with +green TB with glut set in hooklying 3x10 with 3 sec hold  Supine butterfly groin stretch 3x30 sec  Supine hip flexor stretch with strap 3x30 sec R  Modified piriformis stretch 3x30 sec R  Bridging with PPT 2x10 reps  Side lying clamshells +yellow band 2x10 reps each   +alternating hip fallout with PPT x10 reps each  +prone alternating hip extension x10 reps    Kelley received the following manual therapy techniques: Joint mobilizations and Soft tissue Mobilization were applied to the: R hip, glutes, low back for 00 minutes, including:   STM to R gluteal muscles/piriformis/ right lumbar paraspinals in prone   Piriformis " release/flossing R in prone   Manual right quad stretch in prone   MET to correct R anterior rotation of pelvis - not performed   LE long axis distraction of RLE- not performed         Home Exercises Provided and Patient Education Provided     Education provided:   - exercises, exercise execution      Written Home Exercises Provided: Patient instructed to cont prior HEP.  Exercises were reviewed and Kelley was able to demonstrate them prior to the end of the session.  Kelley demonstrated good  understanding of the education provided.     See EMR under Patient Instructions for exercises provided 12/17/2020.    Assessment     Patient continues with decreased pain again today with mild muscle soreness noted from last visit. She was challenged with new exercises today and was able to complete with cueing and instruction for technique. No increase in pain noted throughout session. She is progressing with core activation, but continues to require cueing to achieve. Will continue to progress as tolerated next session.     Kelley is progressing well towards her goals.   Pt prognosis is Good.     Pt will continue to benefit from skilled outpatient physical therapy to address the deficits listed in the problem list box on initial evaluation, provide pt/family education and to maximize pt's level of independence in the home and community environment.     Pt's spiritual, cultural and educational needs considered and pt agreeable to plan of care and goals.     Anticipated barriers to physical therapy: none    Goals:  Short Term Goals (4 Weeks):   1. Pt will be compliant with HEP to supplement PT in restoring pain free function.  2. Pt will increase bilateral hip extension range of motion by 5 degrees in order to improve patient's mobility for normal movement patterns.  3. Pt will demonstrate increased strength of bilateral LEs by 1 muscle grade in order to improve strength for functional tasks.  4. Subjective reports of right hip  pain as </= 5/10 during all standing/ambulatory activities.     Long Term Goals (8 Weeks):  1. Pt will increase bilateral hip extension range of motion by 10 degrees in order to improve patient's mobility for normal movement patterns.  2. Pt will demonstrate increased strength of bilateral LEs to >/= 4+/5 in order to improve strength for functional tasks.  3. Pt improve impaired hip ROM to WNL in all planes to improve mobility for normal movement patterns.   4. Subjective reports of right hip pain as </=1/10 during all standing/ambulatory activities.           Plan   Continue per PT POC    Vanessa Verma, PTA

## 2020-12-30 ENCOUNTER — CLINICAL SUPPORT (OUTPATIENT)
Dept: REHABILITATION | Facility: HOSPITAL | Age: 80
End: 2020-12-30
Payer: MEDICARE

## 2020-12-30 DIAGNOSIS — R29.898 DECREASED STRENGTH OF LOWER EXTREMITY: ICD-10-CM

## 2020-12-30 DIAGNOSIS — M25.651 DECREASED RANGE OF MOTION OF BOTH HIPS: ICD-10-CM

## 2020-12-30 DIAGNOSIS — M25.652 DECREASED RANGE OF MOTION OF BOTH HIPS: ICD-10-CM

## 2020-12-30 PROCEDURE — 97110 THERAPEUTIC EXERCISES: CPT | Mod: HCNC,PO,CQ

## 2021-01-04 ENCOUNTER — CLINICAL SUPPORT (OUTPATIENT)
Dept: REHABILITATION | Facility: HOSPITAL | Age: 81
End: 2021-01-04
Payer: MEDICARE

## 2021-01-04 DIAGNOSIS — M25.651 DECREASED RANGE OF MOTION OF BOTH HIPS: ICD-10-CM

## 2021-01-04 DIAGNOSIS — M25.652 DECREASED RANGE OF MOTION OF BOTH HIPS: ICD-10-CM

## 2021-01-04 DIAGNOSIS — R29.898 DECREASED STRENGTH OF LOWER EXTREMITY: ICD-10-CM

## 2021-01-04 PROCEDURE — 97110 THERAPEUTIC EXERCISES: CPT | Mod: HCNC,PO

## 2021-01-05 ENCOUNTER — OFFICE VISIT (OUTPATIENT)
Dept: INTERNAL MEDICINE | Facility: CLINIC | Age: 81
End: 2021-01-05
Payer: MEDICARE

## 2021-01-05 VITALS
HEIGHT: 63 IN | DIASTOLIC BLOOD PRESSURE: 78 MMHG | HEART RATE: 68 BPM | WEIGHT: 170.19 LBS | OXYGEN SATURATION: 99 % | SYSTOLIC BLOOD PRESSURE: 122 MMHG | BODY MASS INDEX: 30.16 KG/M2

## 2021-01-05 DIAGNOSIS — M81.0 OSTEOPOROSIS, UNSPECIFIED OSTEOPOROSIS TYPE, UNSPECIFIED PATHOLOGICAL FRACTURE PRESENCE: Primary | ICD-10-CM

## 2021-01-05 DIAGNOSIS — E78.5 HYPERLIPIDEMIA, UNSPECIFIED HYPERLIPIDEMIA TYPE: ICD-10-CM

## 2021-01-05 DIAGNOSIS — M89.9 DISORDER OF BONE: ICD-10-CM

## 2021-01-05 DIAGNOSIS — R73.9 HYPERGLYCEMIA: ICD-10-CM

## 2021-01-05 DIAGNOSIS — I10 ESSENTIAL HYPERTENSION: ICD-10-CM

## 2021-01-05 PROCEDURE — 99999 PR PBB SHADOW E&M-EST. PATIENT-LVL III: CPT | Mod: PBBFAC,HCNC,, | Performed by: INTERNAL MEDICINE

## 2021-01-05 PROCEDURE — 99499 RISK ADDL DX/OHS AUDIT: ICD-10-PCS | Mod: S$GLB,,, | Performed by: INTERNAL MEDICINE

## 2021-01-05 PROCEDURE — 3074F PR MOST RECENT SYSTOLIC BLOOD PRESSURE < 130 MM HG: ICD-10-PCS | Mod: HCNC,CPTII,S$GLB, | Performed by: INTERNAL MEDICINE

## 2021-01-05 PROCEDURE — 3074F SYST BP LT 130 MM HG: CPT | Mod: HCNC,CPTII,S$GLB, | Performed by: INTERNAL MEDICINE

## 2021-01-05 PROCEDURE — 1159F PR MEDICATION LIST DOCUMENTED IN MEDICAL RECORD: ICD-10-PCS | Mod: HCNC,S$GLB,, | Performed by: INTERNAL MEDICINE

## 2021-01-05 PROCEDURE — 3288F PR FALLS RISK ASSESSMENT DOCUMENTED: ICD-10-PCS | Mod: HCNC,CPTII,S$GLB, | Performed by: INTERNAL MEDICINE

## 2021-01-05 PROCEDURE — 1159F MED LIST DOCD IN RCRD: CPT | Mod: HCNC,S$GLB,, | Performed by: INTERNAL MEDICINE

## 2021-01-05 PROCEDURE — 99499 UNLISTED E&M SERVICE: CPT | Mod: S$GLB,,, | Performed by: INTERNAL MEDICINE

## 2021-01-05 PROCEDURE — 3078F PR MOST RECENT DIASTOLIC BLOOD PRESSURE < 80 MM HG: ICD-10-PCS | Mod: HCNC,CPTII,S$GLB, | Performed by: INTERNAL MEDICINE

## 2021-01-05 PROCEDURE — 3288F FALL RISK ASSESSMENT DOCD: CPT | Mod: HCNC,CPTII,S$GLB, | Performed by: INTERNAL MEDICINE

## 2021-01-05 PROCEDURE — 1101F PT FALLS ASSESS-DOCD LE1/YR: CPT | Mod: HCNC,CPTII,S$GLB, | Performed by: INTERNAL MEDICINE

## 2021-01-05 PROCEDURE — 1101F PR PT FALLS ASSESS DOC 0-1 FALLS W/OUT INJ PAST YR: ICD-10-PCS | Mod: HCNC,CPTII,S$GLB, | Performed by: INTERNAL MEDICINE

## 2021-01-05 PROCEDURE — 3078F DIAST BP <80 MM HG: CPT | Mod: HCNC,CPTII,S$GLB, | Performed by: INTERNAL MEDICINE

## 2021-01-05 PROCEDURE — 99999 PR PBB SHADOW E&M-EST. PATIENT-LVL III: ICD-10-PCS | Mod: PBBFAC,HCNC,, | Performed by: INTERNAL MEDICINE

## 2021-01-05 PROCEDURE — 99214 PR OFFICE/OUTPT VISIT, EST, LEVL IV, 30-39 MIN: ICD-10-PCS | Mod: HCNC,S$GLB,, | Performed by: INTERNAL MEDICINE

## 2021-01-05 PROCEDURE — 99214 OFFICE O/P EST MOD 30 MIN: CPT | Mod: HCNC,S$GLB,, | Performed by: INTERNAL MEDICINE

## 2021-01-05 RX ORDER — TIZANIDINE 2 MG/1
TABLET ORAL
Qty: 40 TABLET | Refills: 0 | Status: SHIPPED | OUTPATIENT
Start: 2021-01-05 | End: 2022-09-26

## 2021-01-05 RX ORDER — MELOXICAM 7.5 MG/1
7.5 TABLET ORAL DAILY
Qty: 20 TABLET | Refills: 0 | Status: SHIPPED | OUTPATIENT
Start: 2021-01-05 | End: 2021-01-12

## 2021-01-09 ENCOUNTER — IMMUNIZATION (OUTPATIENT)
Dept: INTERNAL MEDICINE | Facility: CLINIC | Age: 81
End: 2021-01-09
Payer: MEDICARE

## 2021-01-09 DIAGNOSIS — Z23 NEED FOR VACCINATION: ICD-10-CM

## 2021-01-09 PROCEDURE — 91300 COVID-19, MRNA, LNP-S, PF, 30 MCG/0.3 ML DOSE VACCINE: CPT | Mod: PBBFAC | Performed by: INTERNAL MEDICINE

## 2021-01-13 ENCOUNTER — CLINICAL SUPPORT (OUTPATIENT)
Dept: REHABILITATION | Facility: HOSPITAL | Age: 81
End: 2021-01-13
Payer: MEDICARE

## 2021-01-13 DIAGNOSIS — M25.652 DECREASED RANGE OF MOTION OF BOTH HIPS: ICD-10-CM

## 2021-01-13 DIAGNOSIS — M25.651 DECREASED RANGE OF MOTION OF BOTH HIPS: ICD-10-CM

## 2021-01-13 DIAGNOSIS — R29.898 DECREASED STRENGTH OF LOWER EXTREMITY: ICD-10-CM

## 2021-01-13 PROCEDURE — 97110 THERAPEUTIC EXERCISES: CPT | Mod: HCNC,PO,CQ

## 2021-01-15 ENCOUNTER — CLINICAL SUPPORT (OUTPATIENT)
Dept: REHABILITATION | Facility: HOSPITAL | Age: 81
End: 2021-01-15
Payer: MEDICARE

## 2021-01-15 DIAGNOSIS — R29.898 DECREASED STRENGTH OF LOWER EXTREMITY: ICD-10-CM

## 2021-01-15 DIAGNOSIS — M25.652 DECREASED RANGE OF MOTION OF BOTH HIPS: ICD-10-CM

## 2021-01-15 DIAGNOSIS — M25.651 DECREASED RANGE OF MOTION OF BOTH HIPS: ICD-10-CM

## 2021-01-15 PROCEDURE — 97110 THERAPEUTIC EXERCISES: CPT | Mod: HCNC,PO,CQ

## 2021-01-19 ENCOUNTER — HOSPITAL ENCOUNTER (OUTPATIENT)
Dept: RADIOLOGY | Facility: OTHER | Age: 81
Discharge: HOME | End: 2021-01-19
Attending: INTERNAL MEDICINE
Payer: MEDICARE

## 2021-01-19 DIAGNOSIS — M81.0 OSTEOPOROSIS, UNSPECIFIED OSTEOPOROSIS TYPE, UNSPECIFIED PATHOLOGICAL FRACTURE PRESENCE: ICD-10-CM

## 2021-01-19 PROCEDURE — 77080 DEXA BONE DENSITY SPINE HIP: ICD-10-PCS | Mod: 26,HCNC,, | Performed by: RADIOLOGY

## 2021-01-19 PROCEDURE — 77080 DXA BONE DENSITY AXIAL: CPT | Mod: 26,HCNC,, | Performed by: RADIOLOGY

## 2021-01-19 PROCEDURE — 77080 DXA BONE DENSITY AXIAL: CPT | Mod: TC,HCNC

## 2021-01-22 ENCOUNTER — CLINICAL SUPPORT (OUTPATIENT)
Dept: REHABILITATION | Facility: HOSPITAL | Age: 81
End: 2021-01-22
Payer: MEDICARE

## 2021-01-22 DIAGNOSIS — M25.651 DECREASED RANGE OF MOTION OF BOTH HIPS: ICD-10-CM

## 2021-01-22 DIAGNOSIS — M25.652 DECREASED RANGE OF MOTION OF BOTH HIPS: ICD-10-CM

## 2021-01-22 DIAGNOSIS — R29.898 DECREASED STRENGTH OF LOWER EXTREMITY: ICD-10-CM

## 2021-01-22 PROCEDURE — 97110 THERAPEUTIC EXERCISES: CPT | Mod: HCNC,PO,CQ

## 2021-01-27 ENCOUNTER — CLINICAL SUPPORT (OUTPATIENT)
Dept: REHABILITATION | Facility: HOSPITAL | Age: 81
End: 2021-01-27
Payer: MEDICARE

## 2021-01-27 DIAGNOSIS — R29.898 DECREASED STRENGTH OF LOWER EXTREMITY: ICD-10-CM

## 2021-01-27 DIAGNOSIS — M25.651 DECREASED RANGE OF MOTION OF BOTH HIPS: ICD-10-CM

## 2021-01-27 DIAGNOSIS — M25.652 DECREASED RANGE OF MOTION OF BOTH HIPS: ICD-10-CM

## 2021-01-27 PROCEDURE — 97140 MANUAL THERAPY 1/> REGIONS: CPT | Mod: HCNC,PO

## 2021-01-27 PROCEDURE — 97110 THERAPEUTIC EXERCISES: CPT | Mod: HCNC,PO

## 2021-01-29 ENCOUNTER — CLINICAL SUPPORT (OUTPATIENT)
Dept: REHABILITATION | Facility: HOSPITAL | Age: 81
End: 2021-01-29
Payer: MEDICARE

## 2021-01-29 DIAGNOSIS — M25.652 DECREASED RANGE OF MOTION OF BOTH HIPS: ICD-10-CM

## 2021-01-29 DIAGNOSIS — R29.898 DECREASED STRENGTH OF LOWER EXTREMITY: ICD-10-CM

## 2021-01-29 DIAGNOSIS — M25.651 DECREASED RANGE OF MOTION OF BOTH HIPS: ICD-10-CM

## 2021-01-29 PROCEDURE — 97110 THERAPEUTIC EXERCISES: CPT | Mod: HCNC,PO,CQ

## 2021-01-30 ENCOUNTER — IMMUNIZATION (OUTPATIENT)
Dept: INTERNAL MEDICINE | Facility: CLINIC | Age: 81
End: 2021-01-30
Payer: MEDICARE

## 2021-01-30 DIAGNOSIS — Z23 NEED FOR VACCINATION: Primary | ICD-10-CM

## 2021-01-30 PROCEDURE — 0002A COVID-19, MRNA, LNP-S, PF, 30 MCG/0.3 ML DOSE VACCINE: CPT | Mod: PBBFAC | Performed by: INTERNAL MEDICINE

## 2021-01-30 PROCEDURE — 91300 COVID-19, MRNA, LNP-S, PF, 30 MCG/0.3 ML DOSE VACCINE: CPT | Mod: PBBFAC | Performed by: INTERNAL MEDICINE

## 2021-02-01 ENCOUNTER — CLINICAL SUPPORT (OUTPATIENT)
Dept: REHABILITATION | Facility: HOSPITAL | Age: 81
End: 2021-02-01
Payer: MEDICARE

## 2021-02-01 DIAGNOSIS — M25.652 DECREASED RANGE OF MOTION OF BOTH HIPS: ICD-10-CM

## 2021-02-01 DIAGNOSIS — M25.651 DECREASED RANGE OF MOTION OF BOTH HIPS: ICD-10-CM

## 2021-02-01 DIAGNOSIS — R29.898 DECREASED STRENGTH OF LOWER EXTREMITY: ICD-10-CM

## 2021-02-01 PROCEDURE — 97110 THERAPEUTIC EXERCISES: CPT | Mod: HCNC,PO,CQ

## 2021-02-03 ENCOUNTER — CLINICAL SUPPORT (OUTPATIENT)
Dept: REHABILITATION | Facility: HOSPITAL | Age: 81
End: 2021-02-03
Payer: MEDICARE

## 2021-02-03 DIAGNOSIS — R29.898 DECREASED STRENGTH OF LOWER EXTREMITY: ICD-10-CM

## 2021-02-03 DIAGNOSIS — M25.651 DECREASED RANGE OF MOTION OF BOTH HIPS: ICD-10-CM

## 2021-02-03 DIAGNOSIS — M25.652 DECREASED RANGE OF MOTION OF BOTH HIPS: ICD-10-CM

## 2021-02-03 PROCEDURE — 97530 THERAPEUTIC ACTIVITIES: CPT | Mod: HCNC,PO

## 2021-02-03 PROCEDURE — 97140 MANUAL THERAPY 1/> REGIONS: CPT | Mod: HCNC,PO

## 2021-02-10 ENCOUNTER — CLINICAL SUPPORT (OUTPATIENT)
Dept: REHABILITATION | Facility: HOSPITAL | Age: 81
End: 2021-02-10
Payer: MEDICARE

## 2021-02-10 DIAGNOSIS — M25.651 DECREASED RANGE OF MOTION OF BOTH HIPS: ICD-10-CM

## 2021-02-10 DIAGNOSIS — M25.652 DECREASED RANGE OF MOTION OF BOTH HIPS: ICD-10-CM

## 2021-02-10 DIAGNOSIS — R29.898 DECREASED STRENGTH OF LOWER EXTREMITY: ICD-10-CM

## 2021-02-10 PROCEDURE — 97140 MANUAL THERAPY 1/> REGIONS: CPT | Mod: HCNC,PO

## 2021-02-10 PROCEDURE — 97110 THERAPEUTIC EXERCISES: CPT | Mod: HCNC,PO

## 2021-02-12 ENCOUNTER — CLINICAL SUPPORT (OUTPATIENT)
Dept: REHABILITATION | Facility: HOSPITAL | Age: 81
End: 2021-02-12
Payer: MEDICARE

## 2021-02-12 DIAGNOSIS — M25.652 DECREASED RANGE OF MOTION OF BOTH HIPS: ICD-10-CM

## 2021-02-12 DIAGNOSIS — M25.651 DECREASED RANGE OF MOTION OF BOTH HIPS: ICD-10-CM

## 2021-02-12 DIAGNOSIS — R29.898 DECREASED STRENGTH OF LOWER EXTREMITY: ICD-10-CM

## 2021-02-12 PROCEDURE — 97140 MANUAL THERAPY 1/> REGIONS: CPT | Mod: HCNC,PO

## 2021-02-12 PROCEDURE — 97110 THERAPEUTIC EXERCISES: CPT | Mod: HCNC,PO

## 2021-02-19 ENCOUNTER — CLINICAL SUPPORT (OUTPATIENT)
Dept: REHABILITATION | Facility: HOSPITAL | Age: 81
End: 2021-02-19
Payer: MEDICARE

## 2021-02-19 DIAGNOSIS — M25.651 DECREASED RANGE OF MOTION OF BOTH HIPS: ICD-10-CM

## 2021-02-19 DIAGNOSIS — M25.652 DECREASED RANGE OF MOTION OF BOTH HIPS: ICD-10-CM

## 2021-02-19 DIAGNOSIS — R29.898 DECREASED STRENGTH OF LOWER EXTREMITY: ICD-10-CM

## 2021-02-19 PROCEDURE — 97140 MANUAL THERAPY 1/> REGIONS: CPT | Mod: PO,CQ

## 2021-02-19 PROCEDURE — 97110 THERAPEUTIC EXERCISES: CPT | Mod: PO,CQ

## 2021-02-22 ENCOUNTER — CLINICAL SUPPORT (OUTPATIENT)
Dept: REHABILITATION | Facility: HOSPITAL | Age: 81
End: 2021-02-22
Payer: MEDICARE

## 2021-02-22 DIAGNOSIS — M25.652 DECREASED RANGE OF MOTION OF BOTH HIPS: ICD-10-CM

## 2021-02-22 DIAGNOSIS — R29.898 DECREASED STRENGTH OF LOWER EXTREMITY: ICD-10-CM

## 2021-02-22 DIAGNOSIS — M25.651 DECREASED RANGE OF MOTION OF BOTH HIPS: ICD-10-CM

## 2021-02-22 PROCEDURE — 97110 THERAPEUTIC EXERCISES: CPT | Mod: PO,CQ

## 2021-02-22 PROCEDURE — 97140 MANUAL THERAPY 1/> REGIONS: CPT | Mod: PO,CQ

## 2021-02-26 ENCOUNTER — CLINICAL SUPPORT (OUTPATIENT)
Dept: REHABILITATION | Facility: HOSPITAL | Age: 81
End: 2021-02-26
Payer: MEDICARE

## 2021-02-26 DIAGNOSIS — M25.652 DECREASED RANGE OF MOTION OF BOTH HIPS: ICD-10-CM

## 2021-02-26 DIAGNOSIS — M25.651 DECREASED RANGE OF MOTION OF BOTH HIPS: ICD-10-CM

## 2021-02-26 DIAGNOSIS — R29.898 DECREASED STRENGTH OF LOWER EXTREMITY: ICD-10-CM

## 2021-02-26 PROCEDURE — 97140 MANUAL THERAPY 1/> REGIONS: CPT | Mod: PO,CQ

## 2021-02-26 PROCEDURE — 97110 THERAPEUTIC EXERCISES: CPT | Mod: PO,CQ

## 2021-02-28 RX ORDER — MELOXICAM 7.5 MG/1
TABLET ORAL
Qty: 30 TABLET | Refills: 0 | Status: SHIPPED | OUTPATIENT
Start: 2021-02-28 | End: 2022-09-26

## 2021-03-03 ENCOUNTER — CLINICAL SUPPORT (OUTPATIENT)
Dept: REHABILITATION | Facility: HOSPITAL | Age: 81
End: 2021-03-03
Payer: MEDICARE

## 2021-03-03 DIAGNOSIS — M25.652 DECREASED RANGE OF MOTION OF BOTH HIPS: ICD-10-CM

## 2021-03-03 DIAGNOSIS — M25.651 DECREASED RANGE OF MOTION OF BOTH HIPS: ICD-10-CM

## 2021-03-03 DIAGNOSIS — R29.898 DECREASED STRENGTH OF LOWER EXTREMITY: ICD-10-CM

## 2021-03-03 PROCEDURE — 97110 THERAPEUTIC EXERCISES: CPT | Mod: PO,CQ

## 2021-03-05 ENCOUNTER — CLINICAL SUPPORT (OUTPATIENT)
Dept: REHABILITATION | Facility: HOSPITAL | Age: 81
End: 2021-03-05
Payer: MEDICARE

## 2021-03-05 DIAGNOSIS — M25.652 DECREASED RANGE OF MOTION OF BOTH HIPS: ICD-10-CM

## 2021-03-05 DIAGNOSIS — M25.651 DECREASED RANGE OF MOTION OF BOTH HIPS: ICD-10-CM

## 2021-03-05 DIAGNOSIS — R29.898 DECREASED STRENGTH OF LOWER EXTREMITY: ICD-10-CM

## 2021-03-05 PROCEDURE — 97110 THERAPEUTIC EXERCISES: CPT | Mod: PO

## 2021-03-10 ENCOUNTER — CLINICAL SUPPORT (OUTPATIENT)
Dept: REHABILITATION | Facility: HOSPITAL | Age: 81
End: 2021-03-10
Payer: MEDICARE

## 2021-03-10 DIAGNOSIS — R29.898 DECREASED STRENGTH OF LOWER EXTREMITY: ICD-10-CM

## 2021-03-10 DIAGNOSIS — M25.651 DECREASED RANGE OF MOTION OF BOTH HIPS: ICD-10-CM

## 2021-03-10 DIAGNOSIS — M25.652 DECREASED RANGE OF MOTION OF BOTH HIPS: ICD-10-CM

## 2021-03-10 PROCEDURE — 97110 THERAPEUTIC EXERCISES: CPT | Mod: PO

## 2021-03-12 ENCOUNTER — CLINICAL SUPPORT (OUTPATIENT)
Dept: REHABILITATION | Facility: HOSPITAL | Age: 81
End: 2021-03-12
Payer: MEDICARE

## 2021-03-12 DIAGNOSIS — M25.652 DECREASED RANGE OF MOTION OF BOTH HIPS: ICD-10-CM

## 2021-03-12 DIAGNOSIS — M25.651 DECREASED RANGE OF MOTION OF BOTH HIPS: ICD-10-CM

## 2021-03-12 DIAGNOSIS — R29.898 DECREASED STRENGTH OF LOWER EXTREMITY: ICD-10-CM

## 2021-03-12 PROCEDURE — 97110 THERAPEUTIC EXERCISES: CPT | Mod: PO

## 2021-05-07 ENCOUNTER — PES CALL (OUTPATIENT)
Dept: ADMINISTRATIVE | Facility: CLINIC | Age: 81
End: 2021-05-07

## 2021-07-08 ENCOUNTER — LAB VISIT (OUTPATIENT)
Dept: LAB | Facility: HOSPITAL | Age: 81
End: 2021-07-08
Attending: INTERNAL MEDICINE
Payer: MEDICARE

## 2021-07-08 ENCOUNTER — OFFICE VISIT (OUTPATIENT)
Dept: INTERNAL MEDICINE | Facility: CLINIC | Age: 81
End: 2021-07-08
Payer: MEDICARE

## 2021-07-08 VITALS
HEART RATE: 70 BPM | OXYGEN SATURATION: 98 % | BODY MASS INDEX: 30 KG/M2 | HEIGHT: 63 IN | TEMPERATURE: 98 F | DIASTOLIC BLOOD PRESSURE: 70 MMHG | SYSTOLIC BLOOD PRESSURE: 118 MMHG | WEIGHT: 169.31 LBS

## 2021-07-08 DIAGNOSIS — Z12.31 SCREENING MAMMOGRAM, ENCOUNTER FOR: ICD-10-CM

## 2021-07-08 DIAGNOSIS — M81.0 OSTEOPOROSIS, UNSPECIFIED OSTEOPOROSIS TYPE, UNSPECIFIED PATHOLOGICAL FRACTURE PRESENCE: ICD-10-CM

## 2021-07-08 DIAGNOSIS — M89.9 DISORDER OF BONE: ICD-10-CM

## 2021-07-08 DIAGNOSIS — R73.9 HYPERGLYCEMIA: ICD-10-CM

## 2021-07-08 DIAGNOSIS — D22.9 NEVUS: ICD-10-CM

## 2021-07-08 DIAGNOSIS — I10 ESSENTIAL HYPERTENSION: Primary | ICD-10-CM

## 2021-07-08 DIAGNOSIS — L72.0 EPIDERMOID CYST: ICD-10-CM

## 2021-07-08 DIAGNOSIS — I10 ESSENTIAL HYPERTENSION: ICD-10-CM

## 2021-07-08 DIAGNOSIS — E78.5 HYPERLIPIDEMIA, UNSPECIFIED HYPERLIPIDEMIA TYPE: ICD-10-CM

## 2021-07-08 LAB
25(OH)D3+25(OH)D2 SERPL-MCNC: 22 NG/ML (ref 30–96)
ALBUMIN SERPL BCP-MCNC: 4 G/DL (ref 3.5–5.2)
ALBUMIN/CREAT UR: NORMAL UG/MG (ref 0–30)
ALP SERPL-CCNC: 78 U/L (ref 55–135)
ALT SERPL W/O P-5'-P-CCNC: 13 U/L (ref 10–44)
ANION GAP SERPL CALC-SCNC: 9 MMOL/L (ref 8–16)
AST SERPL-CCNC: 18 U/L (ref 10–40)
BASOPHILS # BLD AUTO: 0.06 K/UL (ref 0–0.2)
BASOPHILS NFR BLD: 0.9 % (ref 0–1.9)
BILIRUB SERPL-MCNC: 0.6 MG/DL (ref 0.1–1)
BILIRUB UR QL STRIP: NEGATIVE
BUN SERPL-MCNC: 12 MG/DL (ref 8–23)
CALCIUM SERPL-MCNC: 11.2 MG/DL (ref 8.7–10.5)
CHLORIDE SERPL-SCNC: 101 MMOL/L (ref 95–110)
CHOLEST SERPL-MCNC: 200 MG/DL (ref 120–199)
CHOLEST/HDLC SERPL: 2.7 {RATIO} (ref 2–5)
CLARITY UR REFRACT.AUTO: CLEAR
CO2 SERPL-SCNC: 30 MMOL/L (ref 23–29)
COLOR UR AUTO: YELLOW
CREAT SERPL-MCNC: 0.9 MG/DL (ref 0.5–1.4)
CREAT UR-MCNC: 65 MG/DL (ref 15–325)
DIFFERENTIAL METHOD: NORMAL
EOSINOPHIL # BLD AUTO: 0.1 K/UL (ref 0–0.5)
EOSINOPHIL NFR BLD: 1.1 % (ref 0–8)
ERYTHROCYTE [DISTWIDTH] IN BLOOD BY AUTOMATED COUNT: 13 % (ref 11.5–14.5)
EST. GFR  (AFRICAN AMERICAN): >60 ML/MIN/1.73 M^2
EST. GFR  (NON AFRICAN AMERICAN): >60 ML/MIN/1.73 M^2
ESTIMATED AVG GLUCOSE: 126 MG/DL (ref 68–131)
GLUCOSE SERPL-MCNC: 94 MG/DL (ref 70–110)
GLUCOSE UR QL STRIP: NEGATIVE
HBA1C MFR BLD: 6 % (ref 4–5.6)
HCT VFR BLD AUTO: 43 % (ref 37–48.5)
HDLC SERPL-MCNC: 73 MG/DL (ref 40–75)
HDLC SERPL: 36.5 % (ref 20–50)
HGB BLD-MCNC: 13.9 G/DL (ref 12–16)
HGB UR QL STRIP: NEGATIVE
IMM GRANULOCYTES # BLD AUTO: 0.03 K/UL (ref 0–0.04)
IMM GRANULOCYTES NFR BLD AUTO: 0.4 % (ref 0–0.5)
KETONES UR QL STRIP: NEGATIVE
LDLC SERPL CALC-MCNC: 96.2 MG/DL (ref 63–159)
LEUKOCYTE ESTERASE UR QL STRIP: NEGATIVE
LYMPHOCYTES # BLD AUTO: 2.5 K/UL (ref 1–4.8)
LYMPHOCYTES NFR BLD: 35.3 % (ref 18–48)
MCH RBC QN AUTO: 30.3 PG (ref 27–31)
MCHC RBC AUTO-ENTMCNC: 32.3 G/DL (ref 32–36)
MCV RBC AUTO: 94 FL (ref 82–98)
MICROALBUMIN UR DL<=1MG/L-MCNC: <2.5 UG/ML
MICROSCOPIC COMMENT: NORMAL
MONOCYTES # BLD AUTO: 0.7 K/UL (ref 0.3–1)
MONOCYTES NFR BLD: 10.6 % (ref 4–15)
NEUTROPHILS # BLD AUTO: 3.6 K/UL (ref 1.8–7.7)
NEUTROPHILS NFR BLD: 51.7 % (ref 38–73)
NITRITE UR QL STRIP: NEGATIVE
NONHDLC SERPL-MCNC: 127 MG/DL
NRBC BLD-RTO: 0 /100 WBC
PH UR STRIP: 8 [PH] (ref 5–8)
PLATELET # BLD AUTO: 259 K/UL (ref 150–450)
PMV BLD AUTO: 10.7 FL (ref 9.2–12.9)
POTASSIUM SERPL-SCNC: 4.3 MMOL/L (ref 3.5–5.1)
PROT SERPL-MCNC: 8.2 G/DL (ref 6–8.4)
PROT UR QL STRIP: NEGATIVE
RBC # BLD AUTO: 4.58 M/UL (ref 4–5.4)
RBC #/AREA URNS AUTO: 1 /HPF (ref 0–4)
SODIUM SERPL-SCNC: 140 MMOL/L (ref 136–145)
SP GR UR STRIP: 1.01 (ref 1–1.03)
SQUAMOUS #/AREA URNS AUTO: 1 /HPF
TRIGL SERPL-MCNC: 154 MG/DL (ref 30–150)
TSH SERPL DL<=0.005 MIU/L-ACNC: 1.29 UIU/ML (ref 0.4–4)
URN SPEC COLLECT METH UR: NORMAL
WBC # BLD AUTO: 6.96 K/UL (ref 3.9–12.7)
WBC #/AREA URNS AUTO: 1 /HPF (ref 0–5)

## 2021-07-08 PROCEDURE — 85025 COMPLETE CBC W/AUTO DIFF WBC: CPT | Performed by: INTERNAL MEDICINE

## 2021-07-08 PROCEDURE — 80061 LIPID PANEL: CPT | Performed by: INTERNAL MEDICINE

## 2021-07-08 PROCEDURE — 3074F SYST BP LT 130 MM HG: CPT | Mod: CPTII,S$GLB,, | Performed by: INTERNAL MEDICINE

## 2021-07-08 PROCEDURE — 81001 URINALYSIS AUTO W/SCOPE: CPT | Performed by: INTERNAL MEDICINE

## 2021-07-08 PROCEDURE — 1126F PR PAIN SEVERITY QUANTIFIED, NO PAIN PRESENT: ICD-10-PCS | Mod: CPTII,S$GLB,, | Performed by: INTERNAL MEDICINE

## 2021-07-08 PROCEDURE — 83036 HEMOGLOBIN GLYCOSYLATED A1C: CPT | Performed by: INTERNAL MEDICINE

## 2021-07-08 PROCEDURE — 82570 ASSAY OF URINE CREATININE: CPT | Performed by: INTERNAL MEDICINE

## 2021-07-08 PROCEDURE — 99999 PR PBB SHADOW E&M-EST. PATIENT-LVL V: ICD-10-PCS | Mod: PBBFAC,,, | Performed by: INTERNAL MEDICINE

## 2021-07-08 PROCEDURE — 99214 PR OFFICE/OUTPT VISIT, EST, LEVL IV, 30-39 MIN: ICD-10-PCS | Mod: S$GLB,,, | Performed by: INTERNAL MEDICINE

## 2021-07-08 PROCEDURE — 82306 VITAMIN D 25 HYDROXY: CPT | Performed by: INTERNAL MEDICINE

## 2021-07-08 PROCEDURE — 84443 ASSAY THYROID STIM HORMONE: CPT | Performed by: INTERNAL MEDICINE

## 2021-07-08 PROCEDURE — 3078F PR MOST RECENT DIASTOLIC BLOOD PRESSURE < 80 MM HG: ICD-10-PCS | Mod: CPTII,S$GLB,, | Performed by: INTERNAL MEDICINE

## 2021-07-08 PROCEDURE — 36415 COLL VENOUS BLD VENIPUNCTURE: CPT | Performed by: INTERNAL MEDICINE

## 2021-07-08 PROCEDURE — 1159F PR MEDICATION LIST DOCUMENTED IN MEDICAL RECORD: ICD-10-PCS | Mod: CPTII,S$GLB,, | Performed by: INTERNAL MEDICINE

## 2021-07-08 PROCEDURE — 1101F PT FALLS ASSESS-DOCD LE1/YR: CPT | Mod: CPTII,S$GLB,, | Performed by: INTERNAL MEDICINE

## 2021-07-08 PROCEDURE — 3078F DIAST BP <80 MM HG: CPT | Mod: CPTII,S$GLB,, | Performed by: INTERNAL MEDICINE

## 2021-07-08 PROCEDURE — 99999 PR PBB SHADOW E&M-EST. PATIENT-LVL V: CPT | Mod: PBBFAC,,, | Performed by: INTERNAL MEDICINE

## 2021-07-08 PROCEDURE — 99499 RISK ADDL DX/OHS AUDIT: ICD-10-PCS | Mod: HCNC,S$GLB,, | Performed by: INTERNAL MEDICINE

## 2021-07-08 PROCEDURE — 3288F FALL RISK ASSESSMENT DOCD: CPT | Mod: CPTII,S$GLB,, | Performed by: INTERNAL MEDICINE

## 2021-07-08 PROCEDURE — 3288F PR FALLS RISK ASSESSMENT DOCUMENTED: ICD-10-PCS | Mod: CPTII,S$GLB,, | Performed by: INTERNAL MEDICINE

## 2021-07-08 PROCEDURE — 1159F MED LIST DOCD IN RCRD: CPT | Mod: CPTII,S$GLB,, | Performed by: INTERNAL MEDICINE

## 2021-07-08 PROCEDURE — 99499 UNLISTED E&M SERVICE: CPT | Mod: HCNC,S$GLB,, | Performed by: INTERNAL MEDICINE

## 2021-07-08 PROCEDURE — 1101F PR PT FALLS ASSESS DOC 0-1 FALLS W/OUT INJ PAST YR: ICD-10-PCS | Mod: CPTII,S$GLB,, | Performed by: INTERNAL MEDICINE

## 2021-07-08 PROCEDURE — 3074F PR MOST RECENT SYSTOLIC BLOOD PRESSURE < 130 MM HG: ICD-10-PCS | Mod: CPTII,S$GLB,, | Performed by: INTERNAL MEDICINE

## 2021-07-08 PROCEDURE — 82043 UR ALBUMIN QUANTITATIVE: CPT | Performed by: INTERNAL MEDICINE

## 2021-07-08 PROCEDURE — 1126F AMNT PAIN NOTED NONE PRSNT: CPT | Mod: CPTII,S$GLB,, | Performed by: INTERNAL MEDICINE

## 2021-07-08 PROCEDURE — 99214 OFFICE O/P EST MOD 30 MIN: CPT | Mod: S$GLB,,, | Performed by: INTERNAL MEDICINE

## 2021-07-08 PROCEDURE — 80053 COMPREHEN METABOLIC PANEL: CPT | Performed by: INTERNAL MEDICINE

## 2021-07-08 RX ORDER — LOSARTAN POTASSIUM 25 MG/1
25 TABLET ORAL DAILY
Qty: 90 TABLET | Refills: 3 | Status: SHIPPED | OUTPATIENT
Start: 2021-07-08 | End: 2022-09-09

## 2021-07-08 RX ORDER — TRIAMTERENE AND HYDROCHLOROTHIAZIDE 37.5; 25 MG/1; MG/1
1 CAPSULE ORAL DAILY
Qty: 90 CAPSULE | Refills: 3 | Status: SHIPPED | OUTPATIENT
Start: 2021-07-08 | End: 2022-09-09

## 2021-07-16 ENCOUNTER — HOSPITAL ENCOUNTER (OUTPATIENT)
Dept: RADIOLOGY | Facility: OTHER | Age: 81
Discharge: HOME | End: 2021-07-16
Attending: INTERNAL MEDICINE
Payer: MEDICARE

## 2021-07-16 DIAGNOSIS — Z12.31 SCREENING MAMMOGRAM, ENCOUNTER FOR: ICD-10-CM

## 2021-07-16 PROCEDURE — 77063 BREAST TOMOSYNTHESIS BI: CPT | Mod: 26,,, | Performed by: RADIOLOGY

## 2021-07-16 PROCEDURE — 77067 SCR MAMMO BI INCL CAD: CPT | Mod: 26,,, | Performed by: RADIOLOGY

## 2021-07-16 PROCEDURE — 77067 MAMMO DIGITAL SCREENING BILAT WITH TOMO: ICD-10-PCS | Mod: 26,,, | Performed by: RADIOLOGY

## 2021-07-16 PROCEDURE — 77067 SCR MAMMO BI INCL CAD: CPT | Mod: TC

## 2021-07-16 PROCEDURE — 77063 MAMMO DIGITAL SCREENING BILAT WITH TOMO: ICD-10-PCS | Mod: 26,,, | Performed by: RADIOLOGY

## 2021-07-22 ENCOUNTER — OFFICE VISIT (OUTPATIENT)
Dept: SURGERY | Facility: CLINIC | Age: 81
End: 2021-07-22
Payer: MEDICARE

## 2021-07-22 VITALS
HEIGHT: 63 IN | WEIGHT: 171.31 LBS | HEART RATE: 70 BPM | SYSTOLIC BLOOD PRESSURE: 118 MMHG | DIASTOLIC BLOOD PRESSURE: 65 MMHG | BODY MASS INDEX: 30.35 KG/M2

## 2021-07-22 DIAGNOSIS — L72.0 EPIDERMOID CYST: Primary | ICD-10-CM

## 2021-07-22 DIAGNOSIS — I10 ESSENTIAL HYPERTENSION: ICD-10-CM

## 2021-07-22 DIAGNOSIS — E66.9 CLASS 1 OBESITY: ICD-10-CM

## 2021-07-22 DIAGNOSIS — L98.9 SKIN LESION OF NECK: ICD-10-CM

## 2021-07-22 PROCEDURE — 3078F PR MOST RECENT DIASTOLIC BLOOD PRESSURE < 80 MM HG: ICD-10-PCS | Mod: CPTII,S$GLB,, | Performed by: SURGERY

## 2021-07-22 PROCEDURE — 99203 PR OFFICE/OUTPT VISIT, NEW, LEVL III, 30-44 MIN: ICD-10-PCS | Mod: S$GLB,,, | Performed by: SURGERY

## 2021-07-22 PROCEDURE — 1101F PT FALLS ASSESS-DOCD LE1/YR: CPT | Mod: CPTII,S$GLB,, | Performed by: SURGERY

## 2021-07-22 PROCEDURE — 3074F SYST BP LT 130 MM HG: CPT | Mod: CPTII,S$GLB,, | Performed by: SURGERY

## 2021-07-22 PROCEDURE — 1126F PR PAIN SEVERITY QUANTIFIED, NO PAIN PRESENT: ICD-10-PCS | Mod: CPTII,S$GLB,, | Performed by: SURGERY

## 2021-07-22 PROCEDURE — 3074F PR MOST RECENT SYSTOLIC BLOOD PRESSURE < 130 MM HG: ICD-10-PCS | Mod: CPTII,S$GLB,, | Performed by: SURGERY

## 2021-07-22 PROCEDURE — 99999 PR PBB SHADOW E&M-EST. PATIENT-LVL III: CPT | Mod: PBBFAC,,, | Performed by: SURGERY

## 2021-07-22 PROCEDURE — 99203 OFFICE O/P NEW LOW 30 MIN: CPT | Mod: S$GLB,,, | Performed by: SURGERY

## 2021-07-22 PROCEDURE — 3078F DIAST BP <80 MM HG: CPT | Mod: CPTII,S$GLB,, | Performed by: SURGERY

## 2021-07-22 PROCEDURE — 1126F AMNT PAIN NOTED NONE PRSNT: CPT | Mod: CPTII,S$GLB,, | Performed by: SURGERY

## 2021-07-22 PROCEDURE — 1101F PR PT FALLS ASSESS DOC 0-1 FALLS W/OUT INJ PAST YR: ICD-10-PCS | Mod: CPTII,S$GLB,, | Performed by: SURGERY

## 2021-07-22 PROCEDURE — 3288F FALL RISK ASSESSMENT DOCD: CPT | Mod: CPTII,S$GLB,, | Performed by: SURGERY

## 2021-07-22 PROCEDURE — 3288F PR FALLS RISK ASSESSMENT DOCUMENTED: ICD-10-PCS | Mod: CPTII,S$GLB,, | Performed by: SURGERY

## 2021-07-22 PROCEDURE — 1159F MED LIST DOCD IN RCRD: CPT | Mod: CPTII,S$GLB,, | Performed by: SURGERY

## 2021-07-22 PROCEDURE — 99999 PR PBB SHADOW E&M-EST. PATIENT-LVL III: ICD-10-PCS | Mod: PBBFAC,,, | Performed by: SURGERY

## 2021-07-22 PROCEDURE — 1159F PR MEDICATION LIST DOCUMENTED IN MEDICAL RECORD: ICD-10-PCS | Mod: CPTII,S$GLB,, | Performed by: SURGERY

## 2021-08-04 ENCOUNTER — PROCEDURE VISIT (OUTPATIENT)
Dept: SURGERY | Facility: CLINIC | Age: 81
End: 2021-08-04
Payer: MEDICARE

## 2021-08-04 VITALS
HEART RATE: 65 BPM | WEIGHT: 171.75 LBS | BODY MASS INDEX: 30.43 KG/M2 | SYSTOLIC BLOOD PRESSURE: 144 MMHG | DIASTOLIC BLOOD PRESSURE: 65 MMHG | HEIGHT: 63 IN

## 2021-08-04 DIAGNOSIS — L72.0 EPIDERMAL CYST OF NECK: Primary | ICD-10-CM

## 2021-08-04 DIAGNOSIS — E66.9 CLASS 1 OBESITY: ICD-10-CM

## 2021-08-04 DIAGNOSIS — I10 ESSENTIAL HYPERTENSION: ICD-10-CM

## 2021-08-04 PROCEDURE — 11421 PR EXC SKIN BENIG 0.6-1 CM REMAINDR BODY: ICD-10-PCS | Mod: S$GLB,,, | Performed by: SURGERY

## 2021-08-04 PROCEDURE — 88304 TISSUE EXAM BY PATHOLOGIST: CPT | Performed by: PATHOLOGY

## 2021-08-04 PROCEDURE — 88304 TISSUE EXAM BY PATHOLOGIST: CPT | Mod: 26,,, | Performed by: PATHOLOGY

## 2021-08-04 PROCEDURE — 11421 EXC H-F-NK-SP B9+MARG 0.6-1: CPT | Mod: S$GLB,,, | Performed by: SURGERY

## 2021-08-04 PROCEDURE — 88304 PR  SURG PATH,LEVEL III: ICD-10-PCS | Mod: 26,,, | Performed by: PATHOLOGY

## 2021-08-11 LAB
FINAL PATHOLOGIC DIAGNOSIS: NORMAL
GROSS: NORMAL
Lab: NORMAL
MICROSCOPIC EXAM: NORMAL

## 2021-08-16 ENCOUNTER — TELEPHONE (OUTPATIENT)
Dept: ENDOCRINOLOGY | Facility: CLINIC | Age: 81
End: 2021-08-16

## 2021-08-18 ENCOUNTER — OFFICE VISIT (OUTPATIENT)
Dept: DERMATOLOGY | Facility: CLINIC | Age: 81
End: 2021-08-18
Payer: MEDICARE

## 2021-08-18 ENCOUNTER — OFFICE VISIT (OUTPATIENT)
Dept: SURGERY | Facility: CLINIC | Age: 81
End: 2021-08-18
Payer: MEDICARE

## 2021-08-18 VITALS
DIASTOLIC BLOOD PRESSURE: 61 MMHG | SYSTOLIC BLOOD PRESSURE: 133 MMHG | WEIGHT: 171 LBS | HEART RATE: 92 BPM | BODY MASS INDEX: 30.3 KG/M2 | HEIGHT: 63 IN

## 2021-08-18 DIAGNOSIS — L72.0 EPIDERMAL CYST OF NECK: Primary | ICD-10-CM

## 2021-08-18 DIAGNOSIS — D18.01 CHERRY ANGIOMA: ICD-10-CM

## 2021-08-18 PROCEDURE — 3075F PR MOST RECENT SYSTOLIC BLOOD PRESS GE 130-139MM HG: ICD-10-PCS | Mod: HCNC,CPTII,S$GLB, | Performed by: SURGERY

## 2021-08-18 PROCEDURE — 1159F MED LIST DOCD IN RCRD: CPT | Mod: CPTII,S$GLB,, | Performed by: DERMATOLOGY

## 2021-08-18 PROCEDURE — 99202 PR OFFICE/OUTPT VISIT, NEW, LEVL II, 15-29 MIN: ICD-10-PCS | Mod: S$GLB,,, | Performed by: DERMATOLOGY

## 2021-08-18 PROCEDURE — 1126F AMNT PAIN NOTED NONE PRSNT: CPT | Mod: CPTII,S$GLB,, | Performed by: DERMATOLOGY

## 2021-08-18 PROCEDURE — 3075F SYST BP GE 130 - 139MM HG: CPT | Mod: HCNC,CPTII,S$GLB, | Performed by: SURGERY

## 2021-08-18 PROCEDURE — 1159F PR MEDICATION LIST DOCUMENTED IN MEDICAL RECORD: ICD-10-PCS | Mod: HCNC,CPTII,S$GLB, | Performed by: SURGERY

## 2021-08-18 PROCEDURE — 1126F PR PAIN SEVERITY QUANTIFIED, NO PAIN PRESENT: ICD-10-PCS | Mod: CPTII,S$GLB,, | Performed by: DERMATOLOGY

## 2021-08-18 PROCEDURE — 99999 PR PBB SHADOW E&M-EST. PATIENT-LVL III: CPT | Mod: PBBFAC,,, | Performed by: DERMATOLOGY

## 2021-08-18 PROCEDURE — 1159F MED LIST DOCD IN RCRD: CPT | Mod: HCNC,CPTII,S$GLB, | Performed by: SURGERY

## 2021-08-18 PROCEDURE — 3288F FALL RISK ASSESSMENT DOCD: CPT | Mod: CPTII,S$GLB,, | Performed by: DERMATOLOGY

## 2021-08-18 PROCEDURE — 99999 PR PBB SHADOW E&M-EST. PATIENT-LVL III: ICD-10-PCS | Mod: PBBFAC,,, | Performed by: DERMATOLOGY

## 2021-08-18 PROCEDURE — 99999 PR PBB SHADOW E&M-EST. PATIENT-LVL II: CPT | Mod: PBBFAC,HCNC,, | Performed by: SURGERY

## 2021-08-18 PROCEDURE — 1159F PR MEDICATION LIST DOCUMENTED IN MEDICAL RECORD: ICD-10-PCS | Mod: CPTII,S$GLB,, | Performed by: DERMATOLOGY

## 2021-08-18 PROCEDURE — 1126F AMNT PAIN NOTED NONE PRSNT: CPT | Mod: HCNC,CPTII,S$GLB, | Performed by: SURGERY

## 2021-08-18 PROCEDURE — 3288F PR FALLS RISK ASSESSMENT DOCUMENTED: ICD-10-PCS | Mod: CPTII,S$GLB,, | Performed by: DERMATOLOGY

## 2021-08-18 PROCEDURE — 1101F PT FALLS ASSESS-DOCD LE1/YR: CPT | Mod: CPTII,S$GLB,, | Performed by: DERMATOLOGY

## 2021-08-18 PROCEDURE — 99024 POSTOP FOLLOW-UP VISIT: CPT | Mod: HCNC,S$GLB,, | Performed by: SURGERY

## 2021-08-18 PROCEDURE — 99999 PR PBB SHADOW E&M-EST. PATIENT-LVL II: ICD-10-PCS | Mod: PBBFAC,HCNC,, | Performed by: SURGERY

## 2021-08-18 PROCEDURE — 99024 PR POST-OP FOLLOW-UP VISIT: ICD-10-PCS | Mod: HCNC,S$GLB,, | Performed by: SURGERY

## 2021-08-18 PROCEDURE — 99202 OFFICE O/P NEW SF 15 MIN: CPT | Mod: S$GLB,,, | Performed by: DERMATOLOGY

## 2021-08-18 PROCEDURE — 1160F PR REVIEW ALL MEDS BY PRESCRIBER/CLIN PHARMACIST DOCUMENTED: ICD-10-PCS | Mod: HCNC,CPTII,S$GLB, | Performed by: SURGERY

## 2021-08-18 PROCEDURE — 1160F RVW MEDS BY RX/DR IN RCRD: CPT | Mod: HCNC,CPTII,S$GLB, | Performed by: SURGERY

## 2021-08-18 PROCEDURE — 1101F PR PT FALLS ASSESS DOC 0-1 FALLS W/OUT INJ PAST YR: ICD-10-PCS | Mod: CPTII,S$GLB,, | Performed by: DERMATOLOGY

## 2021-08-18 PROCEDURE — 1126F PR PAIN SEVERITY QUANTIFIED, NO PAIN PRESENT: ICD-10-PCS | Mod: HCNC,CPTII,S$GLB, | Performed by: SURGERY

## 2021-08-18 PROCEDURE — 3078F PR MOST RECENT DIASTOLIC BLOOD PRESSURE < 80 MM HG: ICD-10-PCS | Mod: HCNC,CPTII,S$GLB, | Performed by: SURGERY

## 2021-08-18 PROCEDURE — 3078F DIAST BP <80 MM HG: CPT | Mod: HCNC,CPTII,S$GLB, | Performed by: SURGERY

## 2021-09-15 ENCOUNTER — TELEPHONE (OUTPATIENT)
Dept: OPHTHALMOLOGY | Facility: CLINIC | Age: 81
End: 2021-09-15

## 2021-09-20 NOTE — PROGRESS NOTES
"  Physical Therapy Treatment Note     Name: Kelley Perryjohn  Clinic Number: [id number]    Therapy Diagnosis:   Encounter Diagnoses   Name Primary?    Decreased range of motion of both hips     Decreased strength of lower extremity      Physician: Nette Garrett MD    Visit Date: 12/11/2020    Physician Orders: PT Eval and Treat   Medical Diagnosis from Referral: M54.31 (ICD-10-CM) - Right sided sciatica   Evaluation Date: 11/25/2020  Authorization Period Expiration: 06/11/2021   Plan of Care Expiration: 1/22/2020  Visit # / Visits authorized: 2/ 20      Time In: 8:05 am  Time Out: 8:45 am  Total Billable Time: 40 minutes  1 MT, 2 TE  Precautions: Standard    Subjective     Pt reports: she was having 10/10 pain when she woke up this morning stating "I almost didn't come in."   She was not compliant   Response to previous treatment: no change  Functional change: none    Pain: 8/10   Location: right low back and R hip     Objective     Kelley received therapeutic exercises to develop ROM and flexibility for 23 minutes including:    Hip add ball squeeze x10 with 3sec hold  Glute sets x10 reps 3sec hold  PPT x10 reps 3sec hold  Supine clamshells with OTB x10 reps with 3" hold  Supine hamstring stretch 2x30" (with PTA assist)    Kelley received the following manual therapy techniques: Joint mobilizations and Soft tissue Mobilization were applied to the: R ant and lateral thigh for 17 minutes, including:  STM to R gluteal muscles/piriformis with patient in side lying and pillow between knees  MET to correct R anterior rotation of pelvis         Home Exercises Provided and Patient Education Provided     Education provided:   - cueing and demo for all activities performed. Rationale behind interventions and pt's diagnosis/current condition.     Written Home Exercises Provided: yes.  Exercises were reviewed and Kelley was able to demonstrate them prior to the end of the session.  Kelley demonstrated good  understanding " of the education provided.     See EMR under Patient Instructions for exercises provided 12/9/2020.    Assessment     Patient presents with 8/10 pain in right low back and LE with antalgic gait with decreased stance time on RLE. She was instructed on and able to tolerate strength and core stability exercises as noted. She responded well to manual therapy interventions for decreasing radiating symptoms to right lower extremity. Continue progressing as tolerated.     Kelley is progressing well towards her goals.   Pt prognosis is Good.     Pt will continue to benefit from skilled outpatient physical therapy to address the deficits listed in the problem list box on initial evaluation, provide pt/family education and to maximize pt's level of independence in the home and community environment.     Pt's spiritual, cultural and educational needs considered and pt agreeable to plan of care and goals.     Anticipated barriers to physical therapy: none    Goals:  Short Term Goals (4 Weeks):   1. Pt will be compliant with HEP to supplement PT in restoring pain free function.  2. Pt will increase bilateral hip extension range of motion by 5 degrees in order to improve patient's mobility for normal movement patterns.  3. Pt will demonstrate increased strength of bilateral LEs by 1 muscle grade in order to improve strength for functional tasks.  4. Subjective reports of right hip pain as </= 5/10 during all standing/ambulatory activities.     Long Term Goals (8 Weeks):  1. Pt will increase bilateral hip extension range of motion by 10 degrees in order to improve patient's mobility for normal movement patterns.  2. Pt will demonstrate increased strength of bilateral LEs to >/= 4+/5 in order to improve strength for functional tasks.  3. Pt improve impaired hip ROM to WNL in all planes to improve mobility for normal movement patterns.   4. Subjective reports of right hip pain as </=1/10 during all standing/ambulatory activities.            Plan   Plan of care Certification: 11/25/2020 to 1/22/2020.     Outpatient Physical Therapy 2 times weekly for 8 weeks to include the following interventions: Manual Therapy, Moist Heat/ Ice, Neuromuscular Re-ed, Patient Education, Therapeutic Activites, Therapeutic Exercise and Dry Needling.     Assess pelvis for rotation and address if appropriate, continue progressing with core strength and stability as tolerated.     Vanessa Verma, PTA        97.8

## 2021-09-23 ENCOUNTER — IMMUNIZATION (OUTPATIENT)
Dept: INTERNAL MEDICINE | Facility: CLINIC | Age: 81
End: 2021-09-23
Payer: MEDICARE

## 2021-09-23 DIAGNOSIS — Z23 NEED FOR VACCINATION: Primary | ICD-10-CM

## 2021-09-23 PROCEDURE — 0003A COVID-19, MRNA, LNP-S, PF, 30 MCG/0.3 ML DOSE VACCINE: CPT | Mod: HCNC,PBBFAC | Performed by: INTERNAL MEDICINE

## 2021-09-23 PROCEDURE — 91300 COVID-19, MRNA, LNP-S, PF, 30 MCG/0.3 ML DOSE VACCINE: CPT | Mod: HCNC,PBBFAC | Performed by: INTERNAL MEDICINE

## 2021-11-08 ENCOUNTER — OFFICE VISIT (OUTPATIENT)
Dept: OPTOMETRY | Facility: CLINIC | Age: 81
End: 2021-11-08
Payer: MEDICARE

## 2021-11-08 DIAGNOSIS — H52.203 MYOPIA OF BOTH EYES WITH ASTIGMATISM AND PRESBYOPIA: ICD-10-CM

## 2021-11-08 DIAGNOSIS — H25.13 NUCLEAR SCLEROSIS OF BOTH EYES: Primary | ICD-10-CM

## 2021-11-08 DIAGNOSIS — H43.391 VITREOUS FLOATERS OF RIGHT EYE: ICD-10-CM

## 2021-11-08 DIAGNOSIS — H52.13 MYOPIA OF BOTH EYES WITH ASTIGMATISM AND PRESBYOPIA: ICD-10-CM

## 2021-11-08 DIAGNOSIS — Z13.5 SCREENING FOR EYE CONDITION: ICD-10-CM

## 2021-11-08 DIAGNOSIS — H52.4 MYOPIA OF BOTH EYES WITH ASTIGMATISM AND PRESBYOPIA: ICD-10-CM

## 2021-11-08 DIAGNOSIS — Z46.0 ENCOUNTER FOR FITTING OR ADJUSTMENT OF SPECTACLES OR CONTACT LENSES: Primary | ICD-10-CM

## 2021-11-08 PROCEDURE — 92015 PR REFRACTION: ICD-10-PCS | Mod: HCNC,S$GLB,, | Performed by: OPTOMETRIST

## 2021-11-08 PROCEDURE — 92015 DETERMINE REFRACTIVE STATE: CPT | Mod: HCNC,S$GLB,, | Performed by: OPTOMETRIST

## 2021-11-08 PROCEDURE — 1159F MED LIST DOCD IN RCRD: CPT | Mod: HCNC,CPTII,S$GLB, | Performed by: OPTOMETRIST

## 2021-11-08 PROCEDURE — 99499 NO LOS: ICD-10-PCS | Mod: HCNC,S$GLB,, | Performed by: OPTOMETRIST

## 2021-11-08 PROCEDURE — 92014 PR EYE EXAM, EST PATIENT,COMPREHESV: ICD-10-PCS | Mod: HCNC,S$GLB,, | Performed by: OPTOMETRIST

## 2021-11-08 PROCEDURE — 99999 PR PBB SHADOW E&M-EST. PATIENT-LVL II: ICD-10-PCS | Mod: PBBFAC,HCNC,, | Performed by: OPTOMETRIST

## 2021-11-08 PROCEDURE — 99499 UNLISTED E&M SERVICE: CPT | Mod: HCNC,S$GLB,, | Performed by: OPTOMETRIST

## 2021-11-08 PROCEDURE — 92014 COMPRE OPH EXAM EST PT 1/>: CPT | Mod: HCNC,S$GLB,, | Performed by: OPTOMETRIST

## 2021-11-08 PROCEDURE — 99999 PR PBB SHADOW E&M-EST. PATIENT-LVL II: CPT | Mod: PBBFAC,HCNC,, | Performed by: OPTOMETRIST

## 2021-11-08 PROCEDURE — 92310 PR CONTACT LENS FITTING (NO CHANGE): ICD-10-PCS | Mod: CSM,,, | Performed by: OPTOMETRIST

## 2021-11-08 PROCEDURE — 1159F PR MEDICATION LIST DOCUMENTED IN MEDICAL RECORD: ICD-10-PCS | Mod: HCNC,CPTII,S$GLB, | Performed by: OPTOMETRIST

## 2021-11-08 PROCEDURE — 92310 CONTACT LENS FITTING OU: CPT | Mod: CSM,,, | Performed by: OPTOMETRIST

## 2021-11-10 ENCOUNTER — IMMUNIZATION (OUTPATIENT)
Dept: PHARMACY | Facility: CLINIC | Age: 81
End: 2021-11-10
Payer: MEDICARE

## 2021-11-17 ENCOUNTER — LAB VISIT (OUTPATIENT)
Dept: LAB | Facility: HOSPITAL | Age: 81
End: 2021-11-17
Attending: INTERNAL MEDICINE
Payer: MEDICARE

## 2021-11-17 ENCOUNTER — OFFICE VISIT (OUTPATIENT)
Dept: ENDOCRINOLOGY | Facility: CLINIC | Age: 81
End: 2021-11-17
Payer: MEDICARE

## 2021-11-17 VITALS
WEIGHT: 171.44 LBS | HEIGHT: 63 IN | SYSTOLIC BLOOD PRESSURE: 120 MMHG | OXYGEN SATURATION: 99 % | BODY MASS INDEX: 30.38 KG/M2 | HEART RATE: 57 BPM | DIASTOLIC BLOOD PRESSURE: 75 MMHG

## 2021-11-17 DIAGNOSIS — E83.52 HYPERCALCEMIA: ICD-10-CM

## 2021-11-17 DIAGNOSIS — E55.9 VITAMIN D DEFICIENCY: ICD-10-CM

## 2021-11-17 DIAGNOSIS — M80.031A PATHOLOGICAL FRACTURE OF RIGHT RADIUS DUE TO AGE-RELATED OSTEOPOROSIS, INITIAL ENCOUNTER: ICD-10-CM

## 2021-11-17 DIAGNOSIS — M81.0 OSTEOPOROSIS, UNSPECIFIED OSTEOPOROSIS TYPE, UNSPECIFIED PATHOLOGICAL FRACTURE PRESENCE: ICD-10-CM

## 2021-11-17 LAB
25(OH)D3+25(OH)D2 SERPL-MCNC: 13 NG/ML (ref 30–96)
ALBUMIN SERPL BCP-MCNC: 3.9 G/DL (ref 3.5–5.2)
ANION GAP SERPL CALC-SCNC: 9 MMOL/L (ref 8–16)
BUN SERPL-MCNC: 12 MG/DL (ref 8–23)
CALCIUM SERPL-MCNC: 10.8 MG/DL (ref 8.7–10.5)
CHLORIDE SERPL-SCNC: 98 MMOL/L (ref 95–110)
CO2 SERPL-SCNC: 30 MMOL/L (ref 23–29)
CREAT SERPL-MCNC: 0.9 MG/DL (ref 0.5–1.4)
EST. GFR  (AFRICAN AMERICAN): >60 ML/MIN/1.73 M^2
EST. GFR  (NON AFRICAN AMERICAN): >60 ML/MIN/1.73 M^2
GLUCOSE SERPL-MCNC: 83 MG/DL (ref 70–110)
PHOSPHATE SERPL-MCNC: 3.7 MG/DL (ref 2.7–4.5)
POTASSIUM SERPL-SCNC: 4.5 MMOL/L (ref 3.5–5.1)
PTH-INTACT SERPL-MCNC: 69.6 PG/ML (ref 9–77)
SODIUM SERPL-SCNC: 137 MMOL/L (ref 136–145)

## 2021-11-17 PROCEDURE — 84165 PROTEIN E-PHORESIS SERUM: CPT | Mod: 26,HCNC,, | Performed by: PATHOLOGY

## 2021-11-17 PROCEDURE — 80069 RENAL FUNCTION PANEL: CPT | Mod: HCNC | Performed by: INTERNAL MEDICINE

## 2021-11-17 PROCEDURE — 1160F RVW MEDS BY RX/DR IN RCRD: CPT | Mod: HCNC,CPTII,S$GLB, | Performed by: INTERNAL MEDICINE

## 2021-11-17 PROCEDURE — 83970 ASSAY OF PARATHORMONE: CPT | Mod: HCNC | Performed by: INTERNAL MEDICINE

## 2021-11-17 PROCEDURE — 3074F SYST BP LT 130 MM HG: CPT | Mod: HCNC,CPTII,S$GLB, | Performed by: INTERNAL MEDICINE

## 2021-11-17 PROCEDURE — 1159F PR MEDICATION LIST DOCUMENTED IN MEDICAL RECORD: ICD-10-PCS | Mod: HCNC,CPTII,S$GLB, | Performed by: INTERNAL MEDICINE

## 2021-11-17 PROCEDURE — 86334 PATHOLOGIST INTERPRETATION IFE: ICD-10-PCS | Mod: 26,HCNC,, | Performed by: PATHOLOGY

## 2021-11-17 PROCEDURE — 1159F MED LIST DOCD IN RCRD: CPT | Mod: HCNC,CPTII,S$GLB, | Performed by: INTERNAL MEDICINE

## 2021-11-17 PROCEDURE — 99204 PR OFFICE/OUTPT VISIT, NEW, LEVL IV, 45-59 MIN: ICD-10-PCS | Mod: HCNC,S$GLB,, | Performed by: INTERNAL MEDICINE

## 2021-11-17 PROCEDURE — 3288F FALL RISK ASSESSMENT DOCD: CPT | Mod: HCNC,CPTII,S$GLB, | Performed by: INTERNAL MEDICINE

## 2021-11-17 PROCEDURE — 3074F PR MOST RECENT SYSTOLIC BLOOD PRESSURE < 130 MM HG: ICD-10-PCS | Mod: HCNC,CPTII,S$GLB, | Performed by: INTERNAL MEDICINE

## 2021-11-17 PROCEDURE — 3078F DIAST BP <80 MM HG: CPT | Mod: HCNC,CPTII,S$GLB, | Performed by: INTERNAL MEDICINE

## 2021-11-17 PROCEDURE — 86334 IMMUNOFIX E-PHORESIS SERUM: CPT | Mod: 26,HCNC,, | Performed by: PATHOLOGY

## 2021-11-17 PROCEDURE — 1126F AMNT PAIN NOTED NONE PRSNT: CPT | Mod: HCNC,CPTII,S$GLB, | Performed by: INTERNAL MEDICINE

## 2021-11-17 PROCEDURE — 99204 OFFICE O/P NEW MOD 45 MIN: CPT | Mod: HCNC,S$GLB,, | Performed by: INTERNAL MEDICINE

## 2021-11-17 PROCEDURE — 3288F PR FALLS RISK ASSESSMENT DOCUMENTED: ICD-10-PCS | Mod: HCNC,CPTII,S$GLB, | Performed by: INTERNAL MEDICINE

## 2021-11-17 PROCEDURE — 1126F PR PAIN SEVERITY QUANTIFIED, NO PAIN PRESENT: ICD-10-PCS | Mod: HCNC,CPTII,S$GLB, | Performed by: INTERNAL MEDICINE

## 2021-11-17 PROCEDURE — 84165 PATHOLOGIST INTERPRETATION SPE: ICD-10-PCS | Mod: 26,HCNC,, | Performed by: PATHOLOGY

## 2021-11-17 PROCEDURE — 99999 PR PBB SHADOW E&M-EST. PATIENT-LVL IV: ICD-10-PCS | Mod: PBBFAC,HCNC,, | Performed by: INTERNAL MEDICINE

## 2021-11-17 PROCEDURE — 36415 COLL VENOUS BLD VENIPUNCTURE: CPT | Mod: HCNC | Performed by: INTERNAL MEDICINE

## 2021-11-17 PROCEDURE — 1160F PR REVIEW ALL MEDS BY PRESCRIBER/CLIN PHARMACIST DOCUMENTED: ICD-10-PCS | Mod: HCNC,CPTII,S$GLB, | Performed by: INTERNAL MEDICINE

## 2021-11-17 PROCEDURE — 84165 PROTEIN E-PHORESIS SERUM: CPT | Mod: HCNC | Performed by: INTERNAL MEDICINE

## 2021-11-17 PROCEDURE — 86334 IMMUNOFIX E-PHORESIS SERUM: CPT | Mod: HCNC | Performed by: INTERNAL MEDICINE

## 2021-11-17 PROCEDURE — 1101F PR PT FALLS ASSESS DOC 0-1 FALLS W/OUT INJ PAST YR: ICD-10-PCS | Mod: HCNC,CPTII,S$GLB, | Performed by: INTERNAL MEDICINE

## 2021-11-17 PROCEDURE — 1101F PT FALLS ASSESS-DOCD LE1/YR: CPT | Mod: HCNC,CPTII,S$GLB, | Performed by: INTERNAL MEDICINE

## 2021-11-17 PROCEDURE — 82306 VITAMIN D 25 HYDROXY: CPT | Mod: HCNC | Performed by: INTERNAL MEDICINE

## 2021-11-17 PROCEDURE — 99999 PR PBB SHADOW E&M-EST. PATIENT-LVL IV: CPT | Mod: PBBFAC,HCNC,, | Performed by: INTERNAL MEDICINE

## 2021-11-17 PROCEDURE — 3078F PR MOST RECENT DIASTOLIC BLOOD PRESSURE < 80 MM HG: ICD-10-PCS | Mod: HCNC,CPTII,S$GLB, | Performed by: INTERNAL MEDICINE

## 2021-11-18 LAB
ALBUMIN SERPL ELPH-MCNC: 4.24 G/DL (ref 3.35–5.55)
ALPHA1 GLOB SERPL ELPH-MCNC: 0.31 G/DL (ref 0.17–0.41)
ALPHA2 GLOB SERPL ELPH-MCNC: 0.93 G/DL (ref 0.43–0.99)
B-GLOBULIN SERPL ELPH-MCNC: 0.61 G/DL (ref 0.5–1.1)
GAMMA GLOB SERPL ELPH-MCNC: 1.71 G/DL (ref 0.67–1.58)
PROT SERPL-MCNC: 7.8 G/DL (ref 6–8.4)

## 2021-11-19 LAB — PATHOLOGIST INTERPRETATION SPE: NORMAL

## 2021-11-22 LAB
INTERPRETATION SERPL IFE-IMP: NORMAL
PATHOLOGIST INTERPRETATION IFE: NORMAL

## 2021-12-04 ENCOUNTER — PATIENT MESSAGE (OUTPATIENT)
Dept: ENDOCRINOLOGY | Facility: CLINIC | Age: 81
End: 2021-12-04
Payer: MEDICARE

## 2021-12-04 DIAGNOSIS — M80.031A PATHOLOGICAL FRACTURE OF RIGHT RADIUS DUE TO AGE-RELATED OSTEOPOROSIS, INITIAL ENCOUNTER: Primary | ICD-10-CM

## 2022-01-28 DIAGNOSIS — M80.031A PATHOLOGICAL FRACTURE OF RIGHT RADIUS DUE TO AGE-RELATED OSTEOPOROSIS, INITIAL ENCOUNTER: Primary | ICD-10-CM

## 2022-03-07 ENCOUNTER — LAB VISIT (OUTPATIENT)
Dept: LAB | Facility: HOSPITAL | Age: 82
End: 2022-03-07
Attending: INTERNAL MEDICINE
Payer: MEDICARE

## 2022-03-07 DIAGNOSIS — M80.031A PATHOLOGICAL FRACTURE OF RIGHT RADIUS DUE TO AGE-RELATED OSTEOPOROSIS, INITIAL ENCOUNTER: ICD-10-CM

## 2022-03-07 LAB — 25(OH)D3+25(OH)D2 SERPL-MCNC: 25 NG/ML (ref 30–96)

## 2022-03-07 PROCEDURE — 82306 VITAMIN D 25 HYDROXY: CPT | Mod: HCNC | Performed by: INTERNAL MEDICINE

## 2022-03-07 PROCEDURE — 36415 COLL VENOUS BLD VENIPUNCTURE: CPT | Mod: HCNC | Performed by: INTERNAL MEDICINE

## 2022-03-07 PROCEDURE — 84165 PROTEIN E-PHORESIS SERUM: CPT | Mod: HCNC | Performed by: INTERNAL MEDICINE

## 2022-03-07 PROCEDURE — 84165 PATHOLOGIST INTERPRETATION SPE: ICD-10-PCS | Mod: 26,HCNC,, | Performed by: PATHOLOGY

## 2022-03-07 PROCEDURE — 84165 PROTEIN E-PHORESIS SERUM: CPT | Mod: 26,HCNC,, | Performed by: PATHOLOGY

## 2022-03-08 LAB
ALBUMIN SERPL ELPH-MCNC: 4.1 G/DL (ref 3.35–5.55)
ALPHA1 GLOB SERPL ELPH-MCNC: 0.29 G/DL (ref 0.17–0.41)
ALPHA2 GLOB SERPL ELPH-MCNC: 0.77 G/DL (ref 0.43–0.99)
B-GLOBULIN SERPL ELPH-MCNC: 0.58 G/DL (ref 0.5–1.1)
GAMMA GLOB SERPL ELPH-MCNC: 1.56 G/DL (ref 0.67–1.58)
PATHOLOGIST INTERPRETATION SPE: NORMAL
PROT SERPL-MCNC: 7.3 G/DL (ref 6–8.4)

## 2022-08-17 ENCOUNTER — TELEPHONE (OUTPATIENT)
Dept: INTERNAL MEDICINE | Facility: CLINIC | Age: 82
End: 2022-08-17

## 2022-08-17 DIAGNOSIS — R73.9 HYPERGLYCEMIA: ICD-10-CM

## 2022-08-17 DIAGNOSIS — E83.52 HYPERCALCEMIA: ICD-10-CM

## 2022-08-17 DIAGNOSIS — E55.9 VITAMIN D DEFICIENCY: ICD-10-CM

## 2022-08-17 DIAGNOSIS — E78.5 HYPERLIPIDEMIA, UNSPECIFIED HYPERLIPIDEMIA TYPE: ICD-10-CM

## 2022-08-17 DIAGNOSIS — I10 PRIMARY HYPERTENSION: Primary | ICD-10-CM

## 2022-09-21 ENCOUNTER — LAB VISIT (OUTPATIENT)
Dept: LAB | Facility: OTHER | Age: 82
End: 2022-09-21
Attending: INTERNAL MEDICINE
Payer: MEDICARE

## 2022-09-21 DIAGNOSIS — E78.5 HYPERLIPIDEMIA, UNSPECIFIED HYPERLIPIDEMIA TYPE: ICD-10-CM

## 2022-09-21 DIAGNOSIS — I10 PRIMARY HYPERTENSION: ICD-10-CM

## 2022-09-21 DIAGNOSIS — R73.9 HYPERGLYCEMIA: ICD-10-CM

## 2022-09-21 DIAGNOSIS — E55.9 VITAMIN D DEFICIENCY: ICD-10-CM

## 2022-09-21 DIAGNOSIS — E83.52 HYPERCALCEMIA: ICD-10-CM

## 2022-09-21 LAB
25(OH)D3+25(OH)D2 SERPL-MCNC: 29 NG/ML (ref 30–96)
ALBUMIN SERPL BCP-MCNC: 3.7 G/DL (ref 3.5–5.2)
ALP SERPL-CCNC: 73 U/L (ref 55–135)
ALT SERPL W/O P-5'-P-CCNC: 19 U/L (ref 10–44)
ANION GAP SERPL CALC-SCNC: 11 MMOL/L (ref 8–16)
AST SERPL-CCNC: 17 U/L (ref 10–40)
BASOPHILS # BLD AUTO: 0.05 K/UL (ref 0–0.2)
BASOPHILS NFR BLD: 0.8 % (ref 0–1.9)
BILIRUB SERPL-MCNC: 0.5 MG/DL (ref 0.1–1)
BUN SERPL-MCNC: 18 MG/DL (ref 8–23)
CALCIUM SERPL-MCNC: 10.6 MG/DL (ref 8.7–10.5)
CHLORIDE SERPL-SCNC: 102 MMOL/L (ref 95–110)
CHOLEST SERPL-MCNC: 189 MG/DL (ref 120–199)
CHOLEST/HDLC SERPL: 2.8 {RATIO} (ref 2–5)
CO2 SERPL-SCNC: 27 MMOL/L (ref 23–29)
CREAT SERPL-MCNC: 1 MG/DL (ref 0.5–1.4)
DIFFERENTIAL METHOD: ABNORMAL
EOSINOPHIL # BLD AUTO: 0.1 K/UL (ref 0–0.5)
EOSINOPHIL NFR BLD: 2.3 % (ref 0–8)
ERYTHROCYTE [DISTWIDTH] IN BLOOD BY AUTOMATED COUNT: 12.6 % (ref 11.5–14.5)
EST. GFR  (NO RACE VARIABLE): 57 ML/MIN/1.73 M^2
ESTIMATED AVG GLUCOSE: 123 MG/DL (ref 68–131)
GLUCOSE SERPL-MCNC: 87 MG/DL (ref 70–110)
HBA1C MFR BLD: 5.9 % (ref 4–5.6)
HCT VFR BLD AUTO: 41.4 % (ref 37–48.5)
HDLC SERPL-MCNC: 67 MG/DL (ref 40–75)
HDLC SERPL: 35.4 % (ref 20–50)
HGB BLD-MCNC: 13.7 G/DL (ref 12–16)
IMM GRANULOCYTES # BLD AUTO: 0.02 K/UL (ref 0–0.04)
IMM GRANULOCYTES NFR BLD AUTO: 0.3 % (ref 0–0.5)
LDLC SERPL CALC-MCNC: 93.2 MG/DL (ref 63–159)
LYMPHOCYTES # BLD AUTO: 1.7 K/UL (ref 1–4.8)
LYMPHOCYTES NFR BLD: 27.5 % (ref 18–48)
MCH RBC QN AUTO: 31.1 PG (ref 27–31)
MCHC RBC AUTO-ENTMCNC: 33.1 G/DL (ref 32–36)
MCV RBC AUTO: 94 FL (ref 82–98)
MONOCYTES # BLD AUTO: 0.8 K/UL (ref 0.3–1)
MONOCYTES NFR BLD: 12.3 % (ref 4–15)
NEUTROPHILS # BLD AUTO: 3.5 K/UL (ref 1.8–7.7)
NEUTROPHILS NFR BLD: 56.8 % (ref 38–73)
NONHDLC SERPL-MCNC: 122 MG/DL
NRBC BLD-RTO: 0 /100 WBC
PLATELET # BLD AUTO: 262 K/UL (ref 150–450)
PMV BLD AUTO: 10.5 FL (ref 9.2–12.9)
POTASSIUM SERPL-SCNC: 4.4 MMOL/L (ref 3.5–5.1)
PROT SERPL-MCNC: 7.9 G/DL (ref 6–8.4)
PTH-INTACT SERPL-MCNC: 63.5 PG/ML (ref 9–77)
RBC # BLD AUTO: 4.41 M/UL (ref 4–5.4)
SODIUM SERPL-SCNC: 140 MMOL/L (ref 136–145)
TRIGL SERPL-MCNC: 144 MG/DL (ref 30–150)
TSH SERPL DL<=0.005 MIU/L-ACNC: 1.3 UIU/ML (ref 0.4–4)
WBC # BLD AUTO: 6.11 K/UL (ref 3.9–12.7)

## 2022-09-21 PROCEDURE — 83970 ASSAY OF PARATHORMONE: CPT | Performed by: INTERNAL MEDICINE

## 2022-09-21 PROCEDURE — 85025 COMPLETE CBC W/AUTO DIFF WBC: CPT | Performed by: INTERNAL MEDICINE

## 2022-09-21 PROCEDURE — 80061 LIPID PANEL: CPT | Performed by: INTERNAL MEDICINE

## 2022-09-21 PROCEDURE — 83036 HEMOGLOBIN GLYCOSYLATED A1C: CPT | Performed by: INTERNAL MEDICINE

## 2022-09-21 PROCEDURE — 80053 COMPREHEN METABOLIC PANEL: CPT | Performed by: INTERNAL MEDICINE

## 2022-09-21 PROCEDURE — 36415 COLL VENOUS BLD VENIPUNCTURE: CPT | Performed by: INTERNAL MEDICINE

## 2022-09-21 PROCEDURE — 82306 VITAMIN D 25 HYDROXY: CPT | Performed by: INTERNAL MEDICINE

## 2022-09-21 PROCEDURE — 84443 ASSAY THYROID STIM HORMONE: CPT | Performed by: INTERNAL MEDICINE

## 2022-09-26 ENCOUNTER — OFFICE VISIT (OUTPATIENT)
Dept: INTERNAL MEDICINE | Facility: CLINIC | Age: 82
End: 2022-09-26
Payer: MEDICARE

## 2022-09-26 VITALS
WEIGHT: 175.25 LBS | OXYGEN SATURATION: 97 % | BODY MASS INDEX: 31.05 KG/M2 | TEMPERATURE: 99 F | HEIGHT: 63 IN | HEART RATE: 62 BPM | SYSTOLIC BLOOD PRESSURE: 118 MMHG | DIASTOLIC BLOOD PRESSURE: 72 MMHG

## 2022-09-26 DIAGNOSIS — Z12.31 SCREENING MAMMOGRAM, ENCOUNTER FOR: ICD-10-CM

## 2022-09-26 DIAGNOSIS — I10 PRIMARY HYPERTENSION: ICD-10-CM

## 2022-09-26 DIAGNOSIS — E83.52 HYPERCALCEMIA: Primary | ICD-10-CM

## 2022-09-26 DIAGNOSIS — Z01.00 ROUTINE EYE EXAM: ICD-10-CM

## 2022-09-26 LAB
ALBUMIN/CREAT UR: NORMAL UG/MG (ref 0–30)
BILIRUB UR QL STRIP: NEGATIVE
CLARITY UR REFRACT.AUTO: CLEAR
COLOR UR AUTO: YELLOW
CREAT UR-MCNC: 56 MG/DL (ref 15–325)
GLUCOSE UR QL STRIP: NEGATIVE
HGB UR QL STRIP: NEGATIVE
KETONES UR QL STRIP: NEGATIVE
LEUKOCYTE ESTERASE UR QL STRIP: NEGATIVE
MICROALBUMIN UR DL<=1MG/L-MCNC: <5 UG/ML
NITRITE UR QL STRIP: NEGATIVE
PH UR STRIP: 7 [PH] (ref 5–8)
PROT UR QL STRIP: NEGATIVE
SP GR UR STRIP: 1.01 (ref 1–1.03)
URN SPEC COLLECT METH UR: NORMAL

## 2022-09-26 PROCEDURE — 1159F MED LIST DOCD IN RCRD: CPT | Mod: CPTII,S$GLB,, | Performed by: INTERNAL MEDICINE

## 2022-09-26 PROCEDURE — 99214 PR OFFICE/OUTPT VISIT, EST, LEVL IV, 30-39 MIN: ICD-10-PCS | Mod: S$GLB,,, | Performed by: INTERNAL MEDICINE

## 2022-09-26 PROCEDURE — 1101F PT FALLS ASSESS-DOCD LE1/YR: CPT | Mod: CPTII,S$GLB,, | Performed by: INTERNAL MEDICINE

## 2022-09-26 PROCEDURE — 3074F SYST BP LT 130 MM HG: CPT | Mod: CPTII,S$GLB,, | Performed by: INTERNAL MEDICINE

## 2022-09-26 PROCEDURE — 99999 PR PBB SHADOW E&M-EST. PATIENT-LVL IV: ICD-10-PCS | Mod: PBBFAC,,, | Performed by: INTERNAL MEDICINE

## 2022-09-26 PROCEDURE — 81003 URINALYSIS AUTO W/O SCOPE: CPT | Performed by: INTERNAL MEDICINE

## 2022-09-26 PROCEDURE — 3078F PR MOST RECENT DIASTOLIC BLOOD PRESSURE < 80 MM HG: ICD-10-PCS | Mod: CPTII,S$GLB,, | Performed by: INTERNAL MEDICINE

## 2022-09-26 PROCEDURE — 3078F DIAST BP <80 MM HG: CPT | Mod: CPTII,S$GLB,, | Performed by: INTERNAL MEDICINE

## 2022-09-26 PROCEDURE — 90694 VACC AIIV4 NO PRSRV 0.5ML IM: CPT | Mod: S$GLB,,, | Performed by: INTERNAL MEDICINE

## 2022-09-26 PROCEDURE — G0008 ADMIN INFLUENZA VIRUS VAC: HCPCS | Mod: S$GLB,,, | Performed by: INTERNAL MEDICINE

## 2022-09-26 PROCEDURE — G0008 FLU VACCINE - QUADRIVALENT - ADJUVANTED: ICD-10-PCS | Mod: S$GLB,,, | Performed by: INTERNAL MEDICINE

## 2022-09-26 PROCEDURE — 1126F PR PAIN SEVERITY QUANTIFIED, NO PAIN PRESENT: ICD-10-PCS | Mod: CPTII,S$GLB,, | Performed by: INTERNAL MEDICINE

## 2022-09-26 PROCEDURE — 99999 PR PBB SHADOW E&M-EST. PATIENT-LVL IV: CPT | Mod: PBBFAC,,, | Performed by: INTERNAL MEDICINE

## 2022-09-26 PROCEDURE — 82570 ASSAY OF URINE CREATININE: CPT | Performed by: INTERNAL MEDICINE

## 2022-09-26 PROCEDURE — 3288F PR FALLS RISK ASSESSMENT DOCUMENTED: ICD-10-PCS | Mod: CPTII,S$GLB,, | Performed by: INTERNAL MEDICINE

## 2022-09-26 PROCEDURE — 90694 FLU VACCINE - QUADRIVALENT - ADJUVANTED: ICD-10-PCS | Mod: S$GLB,,, | Performed by: INTERNAL MEDICINE

## 2022-09-26 PROCEDURE — 99214 OFFICE O/P EST MOD 30 MIN: CPT | Mod: S$GLB,,, | Performed by: INTERNAL MEDICINE

## 2022-09-26 PROCEDURE — 1101F PR PT FALLS ASSESS DOC 0-1 FALLS W/OUT INJ PAST YR: ICD-10-PCS | Mod: CPTII,S$GLB,, | Performed by: INTERNAL MEDICINE

## 2022-09-26 PROCEDURE — 3288F FALL RISK ASSESSMENT DOCD: CPT | Mod: CPTII,S$GLB,, | Performed by: INTERNAL MEDICINE

## 2022-09-26 PROCEDURE — 1159F PR MEDICATION LIST DOCUMENTED IN MEDICAL RECORD: ICD-10-PCS | Mod: CPTII,S$GLB,, | Performed by: INTERNAL MEDICINE

## 2022-09-26 PROCEDURE — 1126F AMNT PAIN NOTED NONE PRSNT: CPT | Mod: CPTII,S$GLB,, | Performed by: INTERNAL MEDICINE

## 2022-09-26 PROCEDURE — 3074F PR MOST RECENT SYSTOLIC BLOOD PRESSURE < 130 MM HG: ICD-10-PCS | Mod: CPTII,S$GLB,, | Performed by: INTERNAL MEDICINE

## 2022-09-26 RX ORDER — LOSARTAN POTASSIUM 50 MG/1
50 TABLET ORAL DAILY
Qty: 90 TABLET | Refills: 3 | Status: SHIPPED | OUTPATIENT
Start: 2022-09-26 | End: 2023-09-22 | Stop reason: SDUPTHER

## 2022-09-26 RX ORDER — ERGOCALCIFEROL 1.25 MG/1
CAPSULE ORAL
Qty: 12 CAPSULE | Refills: 0 | Status: SHIPPED | OUTPATIENT
Start: 2022-09-26 | End: 2022-12-18

## 2022-09-26 NOTE — PROGRESS NOTES
Subjective:       Patient ID: Kelley Cruz is a 82 y.o. female.    Chief Complaint: Annual Exam (fl) and Flu Vaccine    HPIPt is feeling well - very active - lives alone - drives and travels.  No CP or SOB.    Review of Systems   Respiratory:  Negative for shortness of breath (PND or orthopnea).    Cardiovascular:  Negative for chest pain (arm pain or jaw pain).   Gastrointestinal:  Negative for abdominal pain, diarrhea, nausea and vomiting.   Genitourinary:  Negative for dysuria.   Neurological:  Negative for seizures, syncope and headaches.     Objective:      Physical Exam  Constitutional:       General: She is not in acute distress.     Appearance: She is well-developed.   HENT:      Head: Normocephalic.   Eyes:      Pupils: Pupils are equal, round, and reactive to light.   Neck:      Thyroid: No thyromegaly.      Vascular: No JVD.   Cardiovascular:      Rate and Rhythm: Normal rate and regular rhythm.      Heart sounds: Normal heart sounds. No murmur heard.    No friction rub. No gallop.   Pulmonary:      Effort: Pulmonary effort is normal.      Breath sounds: Normal breath sounds. No wheezing or rales.   Abdominal:      General: Bowel sounds are normal. There is no distension.      Palpations: Abdomen is soft. There is no mass.      Tenderness: There is no abdominal tenderness. There is no guarding or rebound.   Musculoskeletal:      Cervical back: Neck supple.   Lymphadenopathy:      Cervical: No cervical adenopathy.   Skin:     General: Skin is warm and dry.   Neurological:      Mental Status: She is alert and oriented to person, place, and time.      Deep Tendon Reflexes: Reflexes are normal and symmetric.   Psychiatric:         Behavior: Behavior normal.         Thought Content: Thought content normal.         Judgment: Judgment normal.       Assessment:       1. Hypercalcemia    2. Screening mammogram, encounter for    3. Routine eye exam    4. Primary hypertension          Plan:    Hypercalcemia  -     Comprehensive Metabolic Panel; Future; Expected date: 12/26/2022  -     Vitamin D; Future; Expected date: 12/26/2022    Screening mammogram, encounter for  -     Mammo Digital Screening Bilat w/ Abdoul; Future; Expected date: 03/26/2023    Routine eye exam  -     Ambulatory referral/consult to Optometry; Future; Expected date: 10/03/2022    Primary hypertension  -     Urinalysis  -     Microalbumin/Creatinine Ratio, Urine  Controlled - continue current meds    Other orders  -     Influenza - Quadrivalent (Adjuvanted)  -     ergocalciferol (ERGOCALCIFEROL) 50,000 unit Cap; One weekly for 12 weeks then 2,000 IU daily thereafter  Dispense: 12 capsule; Refill: 0  -     losartan (COZAAR) 50 MG tablet; Take 1 tablet (50 mg total) by mouth once daily.  Dispense: 90 tablet; Refill: 3

## 2022-11-28 ENCOUNTER — PES CALL (OUTPATIENT)
Dept: ADMINISTRATIVE | Facility: CLINIC | Age: 82
End: 2022-11-28
Payer: MEDICARE

## 2022-11-30 ENCOUNTER — IMMUNIZATION (OUTPATIENT)
Dept: INTERNAL MEDICINE | Facility: CLINIC | Age: 82
End: 2022-11-30
Payer: MEDICARE

## 2022-11-30 DIAGNOSIS — Z23 NEED FOR VACCINATION: Primary | ICD-10-CM

## 2022-11-30 PROCEDURE — 91312 COVID-19, MRNA, LNP-S, BIVALENT BOOSTER, PF, 30 MCG/0.3 ML DOSE: ICD-10-PCS | Mod: S$GLB,,, | Performed by: INTERNAL MEDICINE

## 2022-11-30 PROCEDURE — 0124A COVID-19, MRNA, LNP-S, BIVALENT BOOSTER, PF, 30 MCG/0.3 ML DOSE: CPT | Mod: PBBFAC | Performed by: INTERNAL MEDICINE

## 2022-11-30 PROCEDURE — 91312 COVID-19, MRNA, LNP-S, BIVALENT BOOSTER, PF, 30 MCG/0.3 ML DOSE: CPT | Mod: S$GLB,,, | Performed by: INTERNAL MEDICINE

## 2022-12-05 RX ORDER — LOSARTAN POTASSIUM 25 MG/1
TABLET ORAL
Qty: 90 TABLET | Refills: 0 | OUTPATIENT
Start: 2022-12-05

## 2022-12-05 RX ORDER — TRIAMTERENE AND HYDROCHLOROTHIAZIDE 37.5; 25 MG/1; MG/1
CAPSULE ORAL
Qty: 90 CAPSULE | Refills: 0 | OUTPATIENT
Start: 2022-12-05

## 2022-12-05 NOTE — TELEPHONE ENCOUNTER
No new care gaps identified.  [facility] Embedded Care Gaps. Reference number: [id number]. 12/05/2022   12:17:33 AM CST

## 2022-12-06 ENCOUNTER — HOSPITAL ENCOUNTER (OUTPATIENT)
Dept: RADIOLOGY | Facility: OTHER | Age: 82
Discharge: HOME | End: 2022-12-06
Attending: INTERNAL MEDICINE
Payer: MEDICARE

## 2022-12-06 DIAGNOSIS — Z12.31 SCREENING MAMMOGRAM, ENCOUNTER FOR: ICD-10-CM

## 2022-12-06 PROCEDURE — 77063 BREAST TOMOSYNTHESIS BI: CPT | Mod: 26,,, | Performed by: RADIOLOGY

## 2022-12-06 PROCEDURE — 77067 SCR MAMMO BI INCL CAD: CPT | Mod: TC

## 2022-12-06 PROCEDURE — 77067 SCR MAMMO BI INCL CAD: CPT | Mod: 26,,, | Performed by: RADIOLOGY

## 2022-12-06 PROCEDURE — 77063 MAMMO DIGITAL SCREENING BILAT WITH TOMO: ICD-10-PCS | Mod: 26,,, | Performed by: RADIOLOGY

## 2022-12-06 PROCEDURE — 77063 BREAST TOMOSYNTHESIS BI: CPT | Mod: TC

## 2022-12-06 PROCEDURE — 77067 MAMMO DIGITAL SCREENING BILAT WITH TOMO: ICD-10-PCS | Mod: 26,,, | Performed by: RADIOLOGY

## 2022-12-06 NOTE — TELEPHONE ENCOUNTER
Quick DC. Inappropriate Request    Refill Authorization Note   Kelley Cruz  is requesting a refill authorization.  Brief Assessment and Rationale for Refill:  Quick Discontinue  Medication Therapy Plan:  Losartan dosage increase 9/26/22. Triamterene/HCTZ D/C 9/26/22    Medication Reconciliation Completed:  No      Comments:     Note composed:9:05 PM 12/05/2022

## 2023-01-03 ENCOUNTER — LAB VISIT (OUTPATIENT)
Dept: LAB | Facility: HOSPITAL | Age: 83
End: 2023-01-03
Attending: INTERNAL MEDICINE
Payer: MEDICARE

## 2023-01-03 ENCOUNTER — CLINICAL SUPPORT (OUTPATIENT)
Dept: INTERNAL MEDICINE | Facility: CLINIC | Age: 83
End: 2023-01-03
Payer: MEDICARE

## 2023-01-03 VITALS — SYSTOLIC BLOOD PRESSURE: 142 MMHG | HEART RATE: 62 BPM | DIASTOLIC BLOOD PRESSURE: 64 MMHG

## 2023-01-03 DIAGNOSIS — M80.031A PATHOLOGICAL FRACTURE OF RIGHT RADIUS DUE TO AGE-RELATED OSTEOPOROSIS, INITIAL ENCOUNTER: ICD-10-CM

## 2023-01-03 DIAGNOSIS — Z01.30 BLOOD PRESSURE CHECK: Primary | ICD-10-CM

## 2023-01-03 DIAGNOSIS — E83.52 HYPERCALCEMIA: ICD-10-CM

## 2023-01-03 LAB
25(OH)D3+25(OH)D2 SERPL-MCNC: 22 NG/ML (ref 30–96)
ALBUMIN SERPL BCP-MCNC: 3.7 G/DL (ref 3.5–5.2)
ALP SERPL-CCNC: 76 U/L (ref 55–135)
ALT SERPL W/O P-5'-P-CCNC: 16 U/L (ref 10–44)
ANION GAP SERPL CALC-SCNC: 7 MMOL/L (ref 8–16)
AST SERPL-CCNC: 19 U/L (ref 10–40)
BILIRUB SERPL-MCNC: 0.5 MG/DL (ref 0.1–1)
BUN SERPL-MCNC: 15 MG/DL (ref 8–23)
CALCIUM SERPL-MCNC: 10.7 MG/DL (ref 8.7–10.5)
CHLORIDE SERPL-SCNC: 102 MMOL/L (ref 95–110)
CO2 SERPL-SCNC: 29 MMOL/L (ref 23–29)
CREAT SERPL-MCNC: 0.8 MG/DL (ref 0.5–1.4)
EST. GFR  (NO RACE VARIABLE): >60 ML/MIN/1.73 M^2
GLUCOSE SERPL-MCNC: 90 MG/DL (ref 70–110)
POTASSIUM SERPL-SCNC: 4.1 MMOL/L (ref 3.5–5.1)
PROT SERPL-MCNC: 7.9 G/DL (ref 6–8.4)
SODIUM SERPL-SCNC: 138 MMOL/L (ref 136–145)

## 2023-01-03 PROCEDURE — 82306 VITAMIN D 25 HYDROXY: CPT | Mod: HCNC | Performed by: INTERNAL MEDICINE

## 2023-01-03 PROCEDURE — 84165 PATHOLOGIST INTERPRETATION SPE: ICD-10-PCS | Mod: 26,HCNC,, | Performed by: PATHOLOGY

## 2023-01-03 PROCEDURE — 84165 PROTEIN E-PHORESIS SERUM: CPT | Mod: 26,HCNC,, | Performed by: PATHOLOGY

## 2023-01-03 PROCEDURE — 99999 PR PBB SHADOW E&M-EST. PATIENT-LVL II: ICD-10-PCS | Mod: PBBFAC,HCNC,,

## 2023-01-03 PROCEDURE — 80053 COMPREHEN METABOLIC PANEL: CPT | Mod: HCNC | Performed by: INTERNAL MEDICINE

## 2023-01-03 PROCEDURE — 36415 COLL VENOUS BLD VENIPUNCTURE: CPT | Mod: HCNC | Performed by: INTERNAL MEDICINE

## 2023-01-03 PROCEDURE — 84165 PROTEIN E-PHORESIS SERUM: CPT | Mod: HCNC | Performed by: INTERNAL MEDICINE

## 2023-01-03 PROCEDURE — 99999 PR PBB SHADOW E&M-EST. PATIENT-LVL II: CPT | Mod: PBBFAC,HCNC,,

## 2023-01-03 NOTE — PROGRESS NOTES
Kelley Cruz 82 y.o. female is here today for Blood Pressure check.     Patient verifies taking blood pressure medications on a regular basis at the same time of the day.     Current Outpatient Medications:     ascorbic acid, vitamin C, (VITAMIN C) 500 MG tablet, Take 1 tablet (500 mg total) by mouth once daily., Disp: 50 tablet, Rfl: 0    ergocalciferol (ERGOCALCIFEROL) 50,000 unit Cap, ONE WEEKLY FOR 12 WEEKS THEN 2,000 IU DAILY THEREAFTER, Disp: 12 capsule, Rfl: 0    losartan (COZAAR) 50 MG tablet, Take 1 tablet (50 mg total) by mouth once daily., Disp: 90 tablet, Rfl: 3    multivitamin (THERAGRAN) per tablet, Take 1 tablet by mouth once daily., Disp: , Rfl:     varicella-zoster gE-AS01B, PF, (SHINGRIX, PF,) 50 mcg/0.5 mL injection, Inject into the muscle., Disp: 0.5 mL, Rfl: 0    Last dose of blood pressure medication was taken at January 2, 2023.    BP (!) 142/64, 62 manual . BP:(!) 149/67, 62 automatic   Dr. Roth  notified.

## 2023-01-04 LAB
ALBUMIN SERPL ELPH-MCNC: 4.05 G/DL (ref 3.35–5.55)
ALPHA1 GLOB SERPL ELPH-MCNC: 0.3 G/DL (ref 0.17–0.41)
ALPHA2 GLOB SERPL ELPH-MCNC: 0.83 G/DL (ref 0.43–0.99)
B-GLOBULIN SERPL ELPH-MCNC: 0.59 G/DL (ref 0.5–1.1)
GAMMA GLOB SERPL ELPH-MCNC: 1.73 G/DL (ref 0.67–1.58)
PROT SERPL-MCNC: 7.5 G/DL (ref 6–8.4)

## 2023-01-05 LAB — PATHOLOGIST INTERPRETATION SPE: NORMAL

## 2023-01-09 ENCOUNTER — PATIENT MESSAGE (OUTPATIENT)
Dept: ENDOCRINOLOGY | Facility: CLINIC | Age: 83
End: 2023-01-09
Payer: MEDICARE

## 2023-01-11 ENCOUNTER — TELEPHONE (OUTPATIENT)
Dept: ENDOCRINOLOGY | Facility: CLINIC | Age: 83
End: 2023-01-11
Payer: MEDICARE

## 2023-01-11 DIAGNOSIS — R77.9 ABNORMAL PLASMA PROTEIN TEST: Primary | ICD-10-CM

## 2023-01-17 ENCOUNTER — LAB VISIT (OUTPATIENT)
Dept: LAB | Facility: HOSPITAL | Age: 83
End: 2023-01-17
Payer: MEDICARE

## 2023-01-17 ENCOUNTER — OFFICE VISIT (OUTPATIENT)
Dept: HEMATOLOGY/ONCOLOGY | Facility: CLINIC | Age: 83
End: 2023-01-17
Payer: MEDICARE

## 2023-01-17 VITALS
TEMPERATURE: 98 F | DIASTOLIC BLOOD PRESSURE: 62 MMHG | HEART RATE: 70 BPM | OXYGEN SATURATION: 97 % | BODY MASS INDEX: 29.51 KG/M2 | HEIGHT: 63 IN | SYSTOLIC BLOOD PRESSURE: 137 MMHG | WEIGHT: 166.56 LBS

## 2023-01-17 DIAGNOSIS — R77.9 ABNORMAL PLASMA PROTEIN TEST: ICD-10-CM

## 2023-01-17 DIAGNOSIS — E83.52 HYPERCALCEMIA: ICD-10-CM

## 2023-01-17 DIAGNOSIS — D47.2 IGM LAMBDA PARAPROTEINEMIA: ICD-10-CM

## 2023-01-17 DIAGNOSIS — I10 PRIMARY HYPERTENSION: Primary | ICD-10-CM

## 2023-01-17 LAB
BASOPHILS # BLD AUTO: 0.06 K/UL (ref 0–0.2)
BASOPHILS NFR BLD: 0.9 % (ref 0–1.9)
DIFFERENTIAL METHOD: ABNORMAL
EOSINOPHIL # BLD AUTO: 0.1 K/UL (ref 0–0.5)
EOSINOPHIL NFR BLD: 1.9 % (ref 0–8)
ERYTHROCYTE [DISTWIDTH] IN BLOOD BY AUTOMATED COUNT: 12.6 % (ref 11.5–14.5)
HCT VFR BLD AUTO: 43 % (ref 37–48.5)
HGB BLD-MCNC: 13.6 G/DL (ref 12–16)
IGA SERPL-MCNC: 47 MG/DL (ref 40–350)
IGG SERPL-MCNC: 606 MG/DL (ref 650–1600)
IGM SERPL-MCNC: 2008 MG/DL (ref 50–300)
IMM GRANULOCYTES # BLD AUTO: 0.02 K/UL (ref 0–0.04)
IMM GRANULOCYTES NFR BLD AUTO: 0.3 % (ref 0–0.5)
LDH SERPL L TO P-CCNC: 111 U/L (ref 110–260)
LYMPHOCYTES # BLD AUTO: 1.8 K/UL (ref 1–4.8)
LYMPHOCYTES NFR BLD: 26.5 % (ref 18–48)
MCH RBC QN AUTO: 30.2 PG (ref 27–31)
MCHC RBC AUTO-ENTMCNC: 31.6 G/DL (ref 32–36)
MCV RBC AUTO: 96 FL (ref 82–98)
MONOCYTES # BLD AUTO: 0.8 K/UL (ref 0.3–1)
MONOCYTES NFR BLD: 11.3 % (ref 4–15)
NEUTROPHILS # BLD AUTO: 4.1 K/UL (ref 1.8–7.7)
NEUTROPHILS NFR BLD: 59.1 % (ref 38–73)
NRBC BLD-RTO: 0 /100 WBC
PLATELET # BLD AUTO: 260 K/UL (ref 150–450)
PMV BLD AUTO: 10.8 FL (ref 9.2–12.9)
RBC # BLD AUTO: 4.5 M/UL (ref 4–5.4)
URATE SERPL-MCNC: 5.7 MG/DL (ref 2.4–5.7)
WBC # BLD AUTO: 6.84 K/UL (ref 3.9–12.7)

## 2023-01-17 PROCEDURE — 3078F DIAST BP <80 MM HG: CPT | Mod: HCNC,CPTII,S$GLB, | Performed by: INTERNAL MEDICINE

## 2023-01-17 PROCEDURE — 1126F AMNT PAIN NOTED NONE PRSNT: CPT | Mod: HCNC,CPTII,S$GLB, | Performed by: INTERNAL MEDICINE

## 2023-01-17 PROCEDURE — 36415 COLL VENOUS BLD VENIPUNCTURE: CPT | Mod: HCNC | Performed by: INTERNAL MEDICINE

## 2023-01-17 PROCEDURE — 99999 PR PBB SHADOW E&M-EST. PATIENT-LVL III: ICD-10-PCS | Mod: PBBFAC,HCNC,, | Performed by: INTERNAL MEDICINE

## 2023-01-17 PROCEDURE — 82784 ASSAY IGA/IGD/IGG/IGM EACH: CPT | Mod: HCNC | Performed by: INTERNAL MEDICINE

## 2023-01-17 PROCEDURE — 99999 PR PBB SHADOW E&M-EST. PATIENT-LVL III: CPT | Mod: PBBFAC,HCNC,, | Performed by: INTERNAL MEDICINE

## 2023-01-17 PROCEDURE — 1126F PR PAIN SEVERITY QUANTIFIED, NO PAIN PRESENT: ICD-10-PCS | Mod: HCNC,CPTII,S$GLB, | Performed by: INTERNAL MEDICINE

## 2023-01-17 PROCEDURE — 3075F SYST BP GE 130 - 139MM HG: CPT | Mod: HCNC,CPTII,S$GLB, | Performed by: INTERNAL MEDICINE

## 2023-01-17 PROCEDURE — 3075F PR MOST RECENT SYSTOLIC BLOOD PRESS GE 130-139MM HG: ICD-10-PCS | Mod: HCNC,CPTII,S$GLB, | Performed by: INTERNAL MEDICINE

## 2023-01-17 PROCEDURE — 83615 LACTATE (LD) (LDH) ENZYME: CPT | Mod: HCNC | Performed by: INTERNAL MEDICINE

## 2023-01-17 PROCEDURE — 99204 OFFICE O/P NEW MOD 45 MIN: CPT | Mod: HCNC,S$GLB,, | Performed by: INTERNAL MEDICINE

## 2023-01-17 PROCEDURE — 85025 COMPLETE CBC W/AUTO DIFF WBC: CPT | Mod: HCNC | Performed by: INTERNAL MEDICINE

## 2023-01-17 PROCEDURE — 99204 PR OFFICE/OUTPT VISIT, NEW, LEVL IV, 45-59 MIN: ICD-10-PCS | Mod: HCNC,S$GLB,, | Performed by: INTERNAL MEDICINE

## 2023-01-17 PROCEDURE — 84550 ASSAY OF BLOOD/URIC ACID: CPT | Mod: HCNC | Performed by: INTERNAL MEDICINE

## 2023-01-17 PROCEDURE — 3078F PR MOST RECENT DIASTOLIC BLOOD PRESSURE < 80 MM HG: ICD-10-PCS | Mod: HCNC,CPTII,S$GLB, | Performed by: INTERNAL MEDICINE

## 2023-01-17 PROCEDURE — 83521 IG LIGHT CHAINS FREE EACH: CPT | Mod: 59,HCNC | Performed by: INTERNAL MEDICINE

## 2023-01-17 NOTE — PROGRESS NOTES
CC: Paraproteinemia, hematology consultation          HPI: Ms. Cruz, 82, F, is here for hematology consultation for paraproteinemia.  She has osteoporosis, HTn.    She has IgM lambda paraproteinemia. The paraprotein measured 0.8g/dl in 2014. It measures 1.13g/dl in 2023.  She has mild intermittent hypercalcemia. No cytopenias. No fever, night sweats, loss of weight or appetite. She denies back/bone pain, tingling/ numbness. Denies any visual disturbance or vision changes.      Past Medical History:   Diagnosis Date    Cataract     Hypertension     Keloid cicatrix     Osteoporosis               Past Surgical History:   Procedure Laterality Date    HYSTERECTOMY      TAHBSO for benign pelvic cystis mass    OPEN REDUCTION AND INTERNAL FIXATION (ORIF) OF FRACTURE OF DISTAL RADIUS Right 1/23/2019    Procedure: ORIF, FRACTURE, RADIUS, DISTAL right;  Surgeon: Lyndsey Carson MD;  Location: [facility] OR [address] [place] [address];  Service: Orthopedics;  Laterality: Right;  Anesthesia: regional/MAC, stretcher, supine, hand pan 1 and pan 2         Family History   Problem Relation Age of Onset    Diabetes Mother     Glaucoma Mother     Cataracts Mother     Hypertension Father     Cancer Father     Diabetes Maternal Aunt     Diabetes Maternal Uncle     Melanoma Neg Hx     Blindness Neg Hx     Macular degeneration Neg Hx     Retinal detachment Neg Hx        Social History     Socioeconomic History    Marital status:    Tobacco Use    Smoking status: Never    Smokeless tobacco: Never   Substance and Sexual Activity    Alcohol use: No    Drug use: No    Sexual activity: Not Currently   Social History Narrative    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    ergocalciferol (ERGOCALCIFEROL) 50,000 unit Cap ONE WEEKLY FOR 12 WEEKS THEN 2,000 IU DAILY THEREAFTER    losartan (COZAAR) 50 MG tablet Take 1 tablet (50 mg total) by mouth once daily.    multivitamin (THERAGRAN) per tablet Take 1 tablet by mouth once daily.    varicella-zoster gE-AS01B, PF, (SHINGRIX, PF,) 50 mcg/0.5 mL injection Inject into the muscle.     No current facility-administered medications for this visit.          Review of Systems   Constitutional:  Positive for malaise/fatigue. Negative for fever.   HENT:  Negative for congestion, ear discharge, ear pain, hearing loss, nosebleeds, sinus pain and tinnitus.    Eyes:  Negative for blurred vision, double vision, photophobia, pain and discharge.   Cardiovascular:  Negative for orthopnea, claudication and leg swelling.   Gastrointestinal:  Negative for abdominal pain, blood in stool, constipation and heartburn.   Genitourinary:  Negative for flank pain, frequency, hematuria and urgency.   Musculoskeletal:  Negative for myalgias and neck pain.   Neurological:  Negative for dizziness, tingling, tremors, sensory change, speech change, focal weakness and headaches.   Endo/Heme/Allergies:  Negative for environmental allergies. Does not bruise/bleed easily.   Psychiatric/Behavioral:  Negative for depression, substance abuse and suicidal ideas.           Vitals:    01/17/23 1025   BP: 137/62   Pulse: 70   Temp: 98.4 °F (36.9 °C)         Physical Exam  Constitutional:       Appearance: Normal appearance.   HENT:      Head: Normocephalic and atraumatic.   Eyes:      General: No scleral icterus.  Cardiovascular:      Rate and Rhythm: Normal rate and regular rhythm.      Heart sounds: Normal heart sounds. No murmur heard.  Pulmonary:      Effort: No respiratory distress.      Breath sounds: Normal breath sounds. No stridor. No rhonchi.   Abdominal:       General: There is no distension.      Palpations: There is no mass.      Tenderness: There is no abdominal tenderness.   Lymphadenopathy:      Cervical: No cervical adenopathy.   Skin:     Coloration: Skin is not jaundiced.   Neurological:      General: No focal deficit present.      Mental Status: She is alert and oriented to person, place, and time.      Cranial Nerves: No cranial nerve deficit.          Component      Latest Ref Rng & Units 1/3/2023 9/21/2022 7/8/2021   WBC      3.90 - 12.70 K/uL  6.11 6.96   RBC      4.00 - 5.40 M/uL  4.41 4.58   Hemoglobin      12.0 - 16.0 g/dL  13.7 13.9   Hematocrit      37.0 - 48.5 %  41.4 43.0   MCV      82 - 98 fL  94 94   MCH      27.0 - 31.0 pg  31.1 (H) 30.3   MCHC      32.0 - 36.0 g/dL  33.1 32.3   RDW      11.5 - 14.5 %  12.6 13.0   Platelets      150 - 450 K/uL  262 259   MPV      9.2 - 12.9 fL  10.5 10.7   Immature Granulocytes      0.0 - 0.5 %  0.3 0.4   Gran # (ANC)      1.8 - 7.7 K/uL  3.5 3.6   Immature Grans (Abs)      0.00 - 0.04 K/uL  0.02 0.03   Lymph #      1.0 - 4.8 K/uL  1.7 2.5   Mono #      0.3 - 1.0 K/uL  0.8 0.7   Eos #      0.0 - 0.5 K/uL  0.1 0.1   Baso #      0.00 - 0.20 K/uL  0.05 0.06   nRBC      0 /100 WBC  0 0   Gran %      38.0 - 73.0 %  56.8 51.7   Lymph %      18.0 - 48.0 %  27.5 35.3   Mono %      4.0 - 15.0 %  12.3 10.6   Eosinophil %      0.0 - 8.0 %  2.3 1.1   Basophil %      0.0 - 1.9 %  0.8 0.9   Differential Method        Automated Automated   Sodium      136 - 145 mmol/L 138  140   Potassium      3.5 - 5.1 mmol/L 4.1  4.3   Chloride      95 - 110 mmol/L 102  101   CO2      23 - 29 mmol/L 29  30 (H)   Glucose      70 - 110 mg/dL 90  94   BUN      8 - 23 mg/dL 15  12   Creatinine      0.5 - 1.4 mg/dL 0.8  0.9   Calcium      8.7 - 10.5 mg/dL 10.7 (H)  11.2 (H)   PROTEIN TOTAL      6.0 - 8.4 g/dL 7.9  8.2   Albumin      3.5 - 5.2 g/dL 3.7  4.0   BILIRUBIN TOTAL      0.1 - 1.0 mg/dL 0.5  0.6   Alkaline Phosphatase      55 - 135 U/L 76  78    AST      10 - 40 U/L 19  18   ALT      10 - 44 U/L 16  13   Anion Gap      8 - 16 mmol/L 7 (L)  9   eGFR if African American      >60 mL/min/1.73 m:2   >60.0   eGFR if non African American      >60 mL/min/1.73 m:2   >60.0   eGFR      >60 mL/min/1.73 m:2 >60.0     Vit D, 25-Hydroxy      30 - 96 ng/mL 22 (L)           Assessment:      1. IgG lambda paraproteinemia  2. Hypercalcemia  3. HTn, primary    Plan:    1. She has stable IgM lambda paraproteinemia , first noticed on SPEP done in 2014. IgM paraprotein has increased slightly, from 0.8g/dl in 2014, to 1.13g/dl in 2023.  She has mild intermittent hypercalcemia. No cytopenias. No fever, night sweats, loss of weight or appetite. She denies back/bone pain, tingling/ numbness. Denies any visual disturbance or vision changes.  She will have serum quantitative immunoglobulin, uric acid, LDH, serum free light chains checked today. No indication for imaging/ BM biopsy at this time.     2. Mild, intermittent, asymptomatic    3. On losartan. She follows with .     I reviewed the results of serum protein electrophoresis and serum immunofixation , complete blood counts and comprehensive metabolic profile from the past 8 years discussed with the patient. I spent over 45 minutes reviewing the results, discussing the significance and documenting.       BMT Chart Routing      Follow up with physician 6 months.   Follow up with L[facility]    Provider visit type    Infusion scheduling note    Injection scheduling note    Labs CBC, free light chains, immunoglobulins, SPEP, immunofixation, LDH and uric acid   Lab interval:  cbc, igg, igm, iga, ldh, light chains, uric acid today; cbc, cmp, spep, immunofixation, igg, igm, iga in 6 months   Imaging    Pharmacy appointment    Other referrals

## 2023-01-17 NOTE — PROGRESS NOTES
CC: Abnormal serum free light chains, hematology consultation    .pat    HPI: Ms. Cruz, 82, F, is here for hematology consultation for abnormal serum free light chains. She has osteoporosis, HTn.  She has fatigue.  No recent change in weight or appetite. She denies fever, night sweats, back/bone pain, tingling/ numbness.       Past Medical History:   Diagnosis Date    Cataract     Hypertension     Keloid cicatrix     Osteoporosis              Past Surgical History:   Procedure Laterality Date    HYSTERECTOMY      TAHBSO for benign pelvic cystis mass    OPEN REDUCTION AND INTERNAL FIXATION (ORIF) OF FRACTURE OF DISTAL RADIUS Right 1/23/2019    Procedure: ORIF, FRACTURE, RADIUS, DISTAL right;  Surgeon: Lyndsey Carson MD;  Location: [facility] OR [address] [place] [address];  Service: Orthopedics;  Laterality: Right;  Anesthesia: regional/MAC, stretcher, supine, hand pan 1 and pan 2       Social History     Socioeconomic History    Marital status:    Tobacco Use    Smoking status: Never    Smokeless tobacco: Never   Substance and Sexual Activity    Alcohol use: No    Drug use: No    Sexual activity: Not Currently   Social History Narrative    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    ergocalciferol (ERGOCALCIFEROL) 50,000 unit Cap ONE WEEKLY FOR 12 WEEKS THEN 2,000 IU DAILY THEREAFTER    losartan (COZAAR) 50 MG tablet Take 1 tablet (50 mg total) by mouth once daily.    multivitamin (THERAGRAN) per tablet Take 1 tablet by mouth once daily.    varicella-zoster gE-AS01B, PF, (SHINGRIX, PF,) 50 mcg/0.5 mL injection Inject into the muscle.      No current facility-administered medications for this visit.

## 2023-01-18 LAB
KAPPA LC SER QL IA: 0.97 MG/DL (ref 0.33–1.94)
KAPPA LC/LAMBDA SER IA: 0.28 (ref 0.26–1.65)
LAMBDA LC SER QL IA: 3.47 MG/DL (ref 0.57–2.63)

## 2023-02-09 DIAGNOSIS — Z00.00 ENCOUNTER FOR MEDICARE ANNUAL WELLNESS EXAM: ICD-10-CM

## 2023-03-01 ENCOUNTER — TELEPHONE (OUTPATIENT)
Dept: INTERNAL MEDICINE | Facility: CLINIC | Age: 83
End: 2023-03-01

## 2023-03-01 DIAGNOSIS — I10 ESSENTIAL HYPERTENSION: ICD-10-CM

## 2023-03-01 DIAGNOSIS — E78.5 HYPERLIPIDEMIA, UNSPECIFIED HYPERLIPIDEMIA TYPE: ICD-10-CM

## 2023-03-01 DIAGNOSIS — E83.52 HYPERCALCEMIA: Primary | ICD-10-CM

## 2023-03-01 DIAGNOSIS — I10 PRIMARY HYPERTENSION: ICD-10-CM

## 2023-03-01 DIAGNOSIS — M81.0 OSTEOPOROSIS, UNSPECIFIED OSTEOPOROSIS TYPE, UNSPECIFIED PATHOLOGICAL FRACTURE PRESENCE: ICD-10-CM

## 2023-03-01 DIAGNOSIS — R73.9 HYPERGLYCEMIA: ICD-10-CM

## 2023-03-02 DIAGNOSIS — Z78.0 MENOPAUSE: ICD-10-CM

## 2023-03-07 ENCOUNTER — TELEPHONE (OUTPATIENT)
Dept: INTERNAL MEDICINE | Facility: CLINIC | Age: 83
End: 2023-03-07
Payer: MEDICARE

## 2023-03-07 NOTE — TELEPHONE ENCOUNTER
----- Message from Alissa Smalls sent at 3/7/2023 12:38 PM CST -----  Contact: Pt Mobile [phone]  Requesting an RX refill or new RX.  Is this a refill or new RX: Refill  RX name and strength Triamterene 37.5 MG   Is this a 30 day or 90 day RX: 30  Pharmacy name and phone # [id number] - [place] [address] [facility]  [address] [facility] [address]  Phone: [phone] Fax: [phone]  The doctors have asked that we provide their patients with the following 2 reminders -- prescription refills can take up to 72 hours, and a friendly reminder that in the future you can use your [facility] account to request refills:

## 2023-03-09 ENCOUNTER — TELEPHONE (OUTPATIENT)
Dept: INTERNAL MEDICINE | Facility: CLINIC | Age: 83
End: 2023-03-09
Payer: MEDICARE

## 2023-03-09 NOTE — TELEPHONE ENCOUNTER
----- Message from Carleen Bautista sent at 3/9/2023  8:19 AM CST -----  Contact: [phone] - pt  Pt calling returning a call regarding appt change and medication     Please call and advise @ [phone]

## 2023-03-13 ENCOUNTER — LAB VISIT (OUTPATIENT)
Dept: LAB | Facility: OTHER | Age: 83
End: 2023-03-13
Attending: INTERNAL MEDICINE
Payer: MEDICARE

## 2023-03-13 ENCOUNTER — OFFICE VISIT (OUTPATIENT)
Dept: INTERNAL MEDICINE | Facility: CLINIC | Age: 83
End: 2023-03-13
Attending: FAMILY MEDICINE
Payer: MEDICARE

## 2023-03-13 VITALS
WEIGHT: 176.38 LBS | BODY MASS INDEX: 31.25 KG/M2 | HEIGHT: 63 IN | OXYGEN SATURATION: 98 % | HEART RATE: 57 BPM | SYSTOLIC BLOOD PRESSURE: 158 MMHG | DIASTOLIC BLOOD PRESSURE: 72 MMHG

## 2023-03-13 DIAGNOSIS — R73.9 HYPERGLYCEMIA: ICD-10-CM

## 2023-03-13 DIAGNOSIS — D47.2 IGM LAMBDA PARAPROTEINEMIA: ICD-10-CM

## 2023-03-13 DIAGNOSIS — I10 PRIMARY HYPERTENSION: Primary | ICD-10-CM

## 2023-03-13 DIAGNOSIS — M81.0 OSTEOPOROSIS, UNSPECIFIED OSTEOPOROSIS TYPE, UNSPECIFIED PATHOLOGICAL FRACTURE PRESENCE: ICD-10-CM

## 2023-03-13 DIAGNOSIS — R60.9 DEPENDENT EDEMA: ICD-10-CM

## 2023-03-13 DIAGNOSIS — I10 ESSENTIAL HYPERTENSION: ICD-10-CM

## 2023-03-13 DIAGNOSIS — E78.5 HYPERLIPIDEMIA, UNSPECIFIED HYPERLIPIDEMIA TYPE: ICD-10-CM

## 2023-03-13 LAB
25(OH)D3+25(OH)D2 SERPL-MCNC: 5 NG/ML (ref 30–96)
ALBUMIN SERPL BCP-MCNC: 3.9 G/DL (ref 3.5–5.2)
ALP SERPL-CCNC: 73 U/L (ref 55–135)
ALT SERPL W/O P-5'-P-CCNC: 16 U/L (ref 10–44)
ANION GAP SERPL CALC-SCNC: 8 MMOL/L (ref 8–16)
AST SERPL-CCNC: 18 U/L (ref 10–40)
BASOPHILS # BLD AUTO: 0.07 K/UL (ref 0–0.2)
BASOPHILS NFR BLD: 1 % (ref 0–1.9)
BILIRUB SERPL-MCNC: 0.4 MG/DL (ref 0.1–1)
BUN SERPL-MCNC: 12 MG/DL (ref 8–23)
CALCIUM SERPL-MCNC: 10.1 MG/DL (ref 8.7–10.5)
CHLORIDE SERPL-SCNC: 105 MMOL/L (ref 95–110)
CHOLEST SERPL-MCNC: 174 MG/DL (ref 120–199)
CHOLEST/HDLC SERPL: 2.4 {RATIO} (ref 2–5)
CO2 SERPL-SCNC: 29 MMOL/L (ref 23–29)
CREAT SERPL-MCNC: 0.9 MG/DL (ref 0.5–1.4)
DIFFERENTIAL METHOD: ABNORMAL
EOSINOPHIL # BLD AUTO: 0.3 K/UL (ref 0–0.5)
EOSINOPHIL NFR BLD: 4.3 % (ref 0–8)
ERYTHROCYTE [DISTWIDTH] IN BLOOD BY AUTOMATED COUNT: 13.2 % (ref 11.5–14.5)
EST. GFR  (NO RACE VARIABLE): >60 ML/MIN/1.73 M^2
ESTIMATED AVG GLUCOSE: 123 MG/DL (ref 68–131)
GLUCOSE SERPL-MCNC: 93 MG/DL (ref 70–110)
HBA1C MFR BLD: 5.9 % (ref 4–5.6)
HCT VFR BLD AUTO: 40.4 % (ref 37–48.5)
HDLC SERPL-MCNC: 73 MG/DL (ref 40–75)
HDLC SERPL: 42 % (ref 20–50)
HGB BLD-MCNC: 12.7 G/DL (ref 12–16)
IMM GRANULOCYTES # BLD AUTO: 0.02 K/UL (ref 0–0.04)
IMM GRANULOCYTES NFR BLD AUTO: 0.3 % (ref 0–0.5)
LDLC SERPL CALC-MCNC: 74.6 MG/DL (ref 63–159)
LYMPHOCYTES # BLD AUTO: 1.9 K/UL (ref 1–4.8)
LYMPHOCYTES NFR BLD: 27.8 % (ref 18–48)
MCH RBC QN AUTO: 30.7 PG (ref 27–31)
MCHC RBC AUTO-ENTMCNC: 31.4 G/DL (ref 32–36)
MCV RBC AUTO: 98 FL (ref 82–98)
MONOCYTES # BLD AUTO: 0.7 K/UL (ref 0.3–1)
MONOCYTES NFR BLD: 10.6 % (ref 4–15)
NEUTROPHILS # BLD AUTO: 3.7 K/UL (ref 1.8–7.7)
NEUTROPHILS NFR BLD: 56 % (ref 38–73)
NONHDLC SERPL-MCNC: 101 MG/DL
NRBC BLD-RTO: 0 /100 WBC
PLATELET # BLD AUTO: 232 K/UL (ref 150–450)
PMV BLD AUTO: 10 FL (ref 9.2–12.9)
POTASSIUM SERPL-SCNC: 4.3 MMOL/L (ref 3.5–5.1)
PROT SERPL-MCNC: 7.8 G/DL (ref 6–8.4)
RBC # BLD AUTO: 4.14 M/UL (ref 4–5.4)
SODIUM SERPL-SCNC: 142 MMOL/L (ref 136–145)
TRIGL SERPL-MCNC: 132 MG/DL (ref 30–150)
TSH SERPL DL<=0.005 MIU/L-ACNC: 1.62 UIU/ML (ref 0.4–4)
WBC # BLD AUTO: 6.69 K/UL (ref 3.9–12.7)

## 2023-03-13 PROCEDURE — 1101F PT FALLS ASSESS-DOCD LE1/YR: CPT | Mod: HCNC,CPTII,S$GLB, | Performed by: FAMILY MEDICINE

## 2023-03-13 PROCEDURE — 3288F FALL RISK ASSESSMENT DOCD: CPT | Mod: HCNC,CPTII,S$GLB, | Performed by: FAMILY MEDICINE

## 2023-03-13 PROCEDURE — 99214 OFFICE O/P EST MOD 30 MIN: CPT | Mod: HCNC,S$GLB,, | Performed by: FAMILY MEDICINE

## 2023-03-13 PROCEDURE — 82306 VITAMIN D 25 HYDROXY: CPT | Mod: HCNC | Performed by: INTERNAL MEDICINE

## 2023-03-13 PROCEDURE — 1160F PR REVIEW ALL MEDS BY PRESCRIBER/CLIN PHARMACIST DOCUMENTED: ICD-10-PCS | Mod: HCNC,CPTII,S$GLB, | Performed by: FAMILY MEDICINE

## 2023-03-13 PROCEDURE — 80053 COMPREHEN METABOLIC PANEL: CPT | Mod: HCNC | Performed by: INTERNAL MEDICINE

## 2023-03-13 PROCEDURE — 80061 LIPID PANEL: CPT | Mod: HCNC | Performed by: INTERNAL MEDICINE

## 2023-03-13 PROCEDURE — 3288F PR FALLS RISK ASSESSMENT DOCUMENTED: ICD-10-PCS | Mod: HCNC,CPTII,S$GLB, | Performed by: FAMILY MEDICINE

## 2023-03-13 PROCEDURE — 3077F SYST BP >= 140 MM HG: CPT | Mod: HCNC,CPTII,S$GLB, | Performed by: FAMILY MEDICINE

## 2023-03-13 PROCEDURE — 85025 COMPLETE CBC W/AUTO DIFF WBC: CPT | Mod: HCNC | Performed by: INTERNAL MEDICINE

## 2023-03-13 PROCEDURE — 1125F AMNT PAIN NOTED PAIN PRSNT: CPT | Mod: HCNC,CPTII,S$GLB, | Performed by: FAMILY MEDICINE

## 2023-03-13 PROCEDURE — 83036 HEMOGLOBIN GLYCOSYLATED A1C: CPT | Mod: HCNC | Performed by: INTERNAL MEDICINE

## 2023-03-13 PROCEDURE — 99214 PR OFFICE/OUTPT VISIT, EST, LEVL IV, 30-39 MIN: ICD-10-PCS | Mod: HCNC,S$GLB,, | Performed by: FAMILY MEDICINE

## 2023-03-13 PROCEDURE — 1125F PR PAIN SEVERITY QUANTIFIED, PAIN PRESENT: ICD-10-PCS | Mod: HCNC,CPTII,S$GLB, | Performed by: FAMILY MEDICINE

## 2023-03-13 PROCEDURE — 1160F RVW MEDS BY RX/DR IN RCRD: CPT | Mod: HCNC,CPTII,S$GLB, | Performed by: FAMILY MEDICINE

## 2023-03-13 PROCEDURE — 1159F PR MEDICATION LIST DOCUMENTED IN MEDICAL RECORD: ICD-10-PCS | Mod: HCNC,CPTII,S$GLB, | Performed by: FAMILY MEDICINE

## 2023-03-13 PROCEDURE — 99999 PR PBB SHADOW E&M-EST. PATIENT-LVL III: CPT | Mod: PBBFAC,HCNC,, | Performed by: FAMILY MEDICINE

## 2023-03-13 PROCEDURE — 1101F PR PT FALLS ASSESS DOC 0-1 FALLS W/OUT INJ PAST YR: ICD-10-PCS | Mod: HCNC,CPTII,S$GLB, | Performed by: FAMILY MEDICINE

## 2023-03-13 PROCEDURE — 3078F PR MOST RECENT DIASTOLIC BLOOD PRESSURE < 80 MM HG: ICD-10-PCS | Mod: HCNC,CPTII,S$GLB, | Performed by: FAMILY MEDICINE

## 2023-03-13 PROCEDURE — 36415 COLL VENOUS BLD VENIPUNCTURE: CPT | Mod: HCNC | Performed by: INTERNAL MEDICINE

## 2023-03-13 PROCEDURE — 3077F PR MOST RECENT SYSTOLIC BLOOD PRESSURE >= 140 MM HG: ICD-10-PCS | Mod: HCNC,CPTII,S$GLB, | Performed by: FAMILY MEDICINE

## 2023-03-13 PROCEDURE — 3078F DIAST BP <80 MM HG: CPT | Mod: HCNC,CPTII,S$GLB, | Performed by: FAMILY MEDICINE

## 2023-03-13 PROCEDURE — 84443 ASSAY THYROID STIM HORMONE: CPT | Mod: HCNC | Performed by: INTERNAL MEDICINE

## 2023-03-13 PROCEDURE — 1159F MED LIST DOCD IN RCRD: CPT | Mod: HCNC,CPTII,S$GLB, | Performed by: FAMILY MEDICINE

## 2023-03-13 PROCEDURE — 99999 PR PBB SHADOW E&M-EST. PATIENT-LVL III: ICD-10-PCS | Mod: PBBFAC,HCNC,, | Performed by: FAMILY MEDICINE

## 2023-03-13 RX ORDER — TRIAMTERENE/HYDROCHLOROTHIAZID 37.5-25 MG
1 TABLET ORAL DAILY
Qty: 30 TABLET | Refills: 11 | Status: SHIPPED | OUTPATIENT
Start: 2023-03-13 | End: 2023-09-22

## 2023-03-13 NOTE — PROGRESS NOTES
"CHIEF COMPLAINT: HTN    HISTORY OF PRESENT ILLNESS: The patient is a 82 year-old BF.  The patient has dependent edema.  The patient wishes to address her hypertension and dependent edema.  She will follow-up with her established primary care physician.  The patient notes that her dependent edema has been present since her Maxzide was discontinued.  She also was started on losartan which is not adequately controlling her pressure.    REVIEW OF SYSTEMS:  GENERAL: No fever, chills, fatigability or weight loss.  SKIN: No rashes, itching or changes in color or texture of skin.  HEAD: No headaches or recent head trauma.  EYES: Visual acuity fine. No photophobia, ocular pain or diplopia.  EARS: Denies ear pain, discharge or vertigo.  NOSE: No loss of smell, no epistaxis or postnasal drip.  MOUTH & THROAT: No hoarseness or change in voice. No excessive gum bleeding.  NODES: Denies swollen glands.  CHEST: Denies CANDELARIO, cyanosis, wheezing, cough and sputum production.  CARDIOVASCULAR: Denies chest pain, PND, orthopnea or reduced exercise tolerance.  ABDOMEN: Appetite fine. No weight loss. Denies diarrhea, abdominal pain, hematemesis or blood in stool.  URINARY: No flank pain, dysuria or hematuria.  PERIPHERAL VASCULAR: No claudication or cyanosis.  MUSCULOSKELETAL: No joint stiffness or swelling. Denies back pain.  NEUROLOGIC: No history of seizures, paralysis, alteration of gait or coordination.    SOCIAL HISTORY: The patient does not smoke.  The patient consumes alcohol socially.  The patient is single.    PHYSICAL EXAMINATION:   Blood pressure (!) 158/72, pulse (!) 57, height 5' 3" (1.6 m), weight 80 kg (176 lb 5.9 oz), SpO2 98 %.  APPEARANCE: Well nourished, well developed, in no acute distress.    HEAD: Normocephalic, atraumatic.  EYES: PERRL. EOMI.  Conjunctivae without injection and  anicteric  NOSE: Mucosa pink. Airway clear.  MOUTH & THROAT: No tonsillar enlargement. No pharyngeal erythema or exudate. No " stridor.  NECK: Supple.   NODES: No cervical, axillary or inguinal lymph node enlargement.  CHEST: Lungs clear to auscultation.  No retractions are noted.  No rales or rhonchi are present.  CARDIOVASCULAR: Normal S1, S2. No rubs, murmurs or gallops.  ABDOMEN: Bowel sounds normal. Not distended. Soft.  No tenderness or masses.  No ascites is noted.  MUSCULOSKELETAL:  There is no clubbing, cyanosis of the extremities x4.  There is full range of motion of the lumbar spine.  There is full range of motion of the extremities x4.  There is no deformity noted.    3+ dependent edema   NEUROLOGIC:       Normal speech development.      Hearing normal.      Normal gait.      Motor and sensory exams grossly normal.  PSYCHIATRIC: Patient is alert and oriented x3.  Thought processes are all normal.  There is no homicidality.  There is no suicidality.  There is no evidence of psychosis.    LABORATORY/RADIOLOGY:   Chart reviewed.      ASSESSMENT:   Hypertension  Dependent edema    PLAN:  Restart Maxzide  F/U PCP as scheduled

## 2023-03-23 ENCOUNTER — TELEPHONE (OUTPATIENT)
Dept: OPHTHALMOLOGY | Facility: CLINIC | Age: 83
End: 2023-03-23
Payer: MEDICARE

## 2023-03-23 ENCOUNTER — OFFICE VISIT (OUTPATIENT)
Dept: INTERNAL MEDICINE | Facility: CLINIC | Age: 83
End: 2023-03-23
Payer: MEDICARE

## 2023-03-23 VITALS
HEART RATE: 68 BPM | WEIGHT: 167.13 LBS | SYSTOLIC BLOOD PRESSURE: 144 MMHG | OXYGEN SATURATION: 95 % | DIASTOLIC BLOOD PRESSURE: 78 MMHG | HEIGHT: 63 IN | BODY MASS INDEX: 29.61 KG/M2

## 2023-03-23 DIAGNOSIS — I10 PRIMARY HYPERTENSION: Primary | ICD-10-CM

## 2023-03-23 DIAGNOSIS — M81.0 OSTEOPOROSIS, UNSPECIFIED OSTEOPOROSIS TYPE, UNSPECIFIED PATHOLOGICAL FRACTURE PRESENCE: ICD-10-CM

## 2023-03-23 DIAGNOSIS — E83.52 HYPERCALCEMIA: ICD-10-CM

## 2023-03-23 DIAGNOSIS — E55.9 VITAMIN D DEFICIENCY: ICD-10-CM

## 2023-03-23 DIAGNOSIS — H53.9 VISUAL DISTURBANCE: ICD-10-CM

## 2023-03-23 PROCEDURE — 1101F PT FALLS ASSESS-DOCD LE1/YR: CPT | Mod: HCNC,CPTII,S$GLB, | Performed by: INTERNAL MEDICINE

## 2023-03-23 PROCEDURE — 99214 PR OFFICE/OUTPT VISIT, EST, LEVL IV, 30-39 MIN: ICD-10-PCS | Mod: HCNC,S$GLB,, | Performed by: INTERNAL MEDICINE

## 2023-03-23 PROCEDURE — 1101F PR PT FALLS ASSESS DOC 0-1 FALLS W/OUT INJ PAST YR: ICD-10-PCS | Mod: HCNC,CPTII,S$GLB, | Performed by: INTERNAL MEDICINE

## 2023-03-23 PROCEDURE — 3077F SYST BP >= 140 MM HG: CPT | Mod: HCNC,CPTII,S$GLB, | Performed by: INTERNAL MEDICINE

## 2023-03-23 PROCEDURE — 1126F PR PAIN SEVERITY QUANTIFIED, NO PAIN PRESENT: ICD-10-PCS | Mod: HCNC,CPTII,S$GLB, | Performed by: INTERNAL MEDICINE

## 2023-03-23 PROCEDURE — 3077F PR MOST RECENT SYSTOLIC BLOOD PRESSURE >= 140 MM HG: ICD-10-PCS | Mod: HCNC,CPTII,S$GLB, | Performed by: INTERNAL MEDICINE

## 2023-03-23 PROCEDURE — 3078F DIAST BP <80 MM HG: CPT | Mod: HCNC,CPTII,S$GLB, | Performed by: INTERNAL MEDICINE

## 2023-03-23 PROCEDURE — 3288F PR FALLS RISK ASSESSMENT DOCUMENTED: ICD-10-PCS | Mod: HCNC,CPTII,S$GLB, | Performed by: INTERNAL MEDICINE

## 2023-03-23 PROCEDURE — 1126F AMNT PAIN NOTED NONE PRSNT: CPT | Mod: HCNC,CPTII,S$GLB, | Performed by: INTERNAL MEDICINE

## 2023-03-23 PROCEDURE — 99214 OFFICE O/P EST MOD 30 MIN: CPT | Mod: HCNC,S$GLB,, | Performed by: INTERNAL MEDICINE

## 2023-03-23 PROCEDURE — 3288F FALL RISK ASSESSMENT DOCD: CPT | Mod: HCNC,CPTII,S$GLB, | Performed by: INTERNAL MEDICINE

## 2023-03-23 PROCEDURE — 99999 PR PBB SHADOW E&M-EST. PATIENT-LVL IV: ICD-10-PCS | Mod: PBBFAC,HCNC,, | Performed by: INTERNAL MEDICINE

## 2023-03-23 PROCEDURE — 1159F MED LIST DOCD IN RCRD: CPT | Mod: HCNC,CPTII,S$GLB, | Performed by: INTERNAL MEDICINE

## 2023-03-23 PROCEDURE — 1159F PR MEDICATION LIST DOCUMENTED IN MEDICAL RECORD: ICD-10-PCS | Mod: HCNC,CPTII,S$GLB, | Performed by: INTERNAL MEDICINE

## 2023-03-23 PROCEDURE — 3078F PR MOST RECENT DIASTOLIC BLOOD PRESSURE < 80 MM HG: ICD-10-PCS | Mod: HCNC,CPTII,S$GLB, | Performed by: INTERNAL MEDICINE

## 2023-03-23 PROCEDURE — 99999 PR PBB SHADOW E&M-EST. PATIENT-LVL IV: CPT | Mod: PBBFAC,HCNC,, | Performed by: INTERNAL MEDICINE

## 2023-03-23 RX ORDER — TRIAMTERENE AND HYDROCHLOROTHIAZIDE 37.5; 25 MG/1; MG/1
1 CAPSULE ORAL EVERY MORNING
Qty: 90 CAPSULE | Refills: 3 | Status: SHIPPED | OUTPATIENT
Start: 2023-03-23 | End: 2023-09-22 | Stop reason: SDUPTHER

## 2023-03-23 NOTE — TELEPHONE ENCOUNTER
Called patient lvm to schedule appointment     ----- Message from Evette Francisco sent at 3/23/2023  2:07 PM CDT -----  Dr Roth has placed a referral for a Consult to Optometry. Pt's last visit with Dr Barth was 11/8/21. Please assist with scheduling. Contact # [phone]       Thanks

## 2023-03-23 NOTE — PROGRESS NOTES
Subjective:       Patient ID: Kelley Cruz is a 83 y.o. female.    Chief Complaint: Follow-up and Foot Swelling (hands)    Follow-up  Pertinent negatives include no abdominal pain, chest pain (arm pain or jaw pain), headaches, nausea or vomiting. Pt is doing well - lives alone and drives.  No CP or SOB. No recent falls.  Pt recently  bent down and misjudged and hit her eye on the back of a chair.  Review of Systems   Respiratory:  Negative for shortness of breath (PND or orthopnea).    Cardiovascular:  Negative for chest pain (arm pain or jaw pain).   Gastrointestinal:  Negative for abdominal pain, diarrhea, nausea and vomiting.   Genitourinary:  Negative for dysuria.   Neurological:  Negative for seizures, syncope and headaches.     Objective:      Physical Exam  Constitutional:       General: She is not in acute distress.     Appearance: She is well-developed.   HENT:      Head: Normocephalic.   Eyes:      Pupils: Pupils are equal, round, and reactive to light.   Neck:      Thyroid: No thyromegaly.      Vascular: No JVD.   Cardiovascular:      Rate and Rhythm: Normal rate and regular rhythm.      Heart sounds: Normal heart sounds. No murmur heard.    No friction rub. No gallop.   Pulmonary:      Effort: Pulmonary effort is normal.      Breath sounds: Normal breath sounds. No wheezing or rales.   Abdominal:      General: Bowel sounds are normal. There is no distension.      Palpations: Abdomen is soft. There is no mass.      Tenderness: There is no abdominal tenderness. There is no guarding or rebound.   Musculoskeletal:      Cervical back: Neck supple.   Lymphadenopathy:      Cervical: No cervical adenopathy.   Skin:     General: Skin is warm and dry.   Neurological:      Mental Status: She is alert and oriented to person, place, and time.      Deep Tendon Reflexes: Reflexes are normal and symmetric.      Comments: No major injury - hit below her R eye and there is some bruising   Psychiatric:          Behavior: Behavior normal.         Thought Content: Thought content normal.         Judgment: Judgment normal.       Assessment:       1. Primary hypertension    2. Hypercalcemia    3. Osteoporosis, unspecified osteoporosis type, unspecified pathological fracture presence    4. Visual disturbance    5. Vitamin D deficiency          Plan:   Primary hypertension  -     Ambulatory referral/consult to Optometry; Future; Expected date: 03/30/2023  Controlled - continue current meds    Hypercalcemia  resolved  Osteoporosis, unspecified osteoporosis type, unspecified pathological fracture presence  -     DXA Bone Density Axial Skeleton 1 or more sites; Future; Expected date: 03/23/2023    Visual disturbance  -     CT Head Without Contrast; Future; Expected date: 03/23/2023    Vitamin D deficiency    Other orders  -     triamterene-hydrochlorothiazide 37.5-25 mg (DYAZIDE) 37.5-25 mg per capsule; Take 1 capsule by mouth every morning.  Dispense: 90 capsule; Refill: 3

## 2023-04-04 ENCOUNTER — HOSPITAL ENCOUNTER (OUTPATIENT)
Dept: RADIOLOGY | Facility: OTHER | Age: 83
Discharge: HOME | End: 2023-04-04
Attending: INTERNAL MEDICINE
Payer: MEDICARE

## 2023-04-04 DIAGNOSIS — H53.9 VISUAL DISTURBANCE: ICD-10-CM

## 2023-04-04 DIAGNOSIS — M81.0 OSTEOPOROSIS, UNSPECIFIED OSTEOPOROSIS TYPE, UNSPECIFIED PATHOLOGICAL FRACTURE PRESENCE: ICD-10-CM

## 2023-04-04 PROCEDURE — 70450 CT HEAD/BRAIN W/O DYE: CPT | Mod: 26,HCNC,, | Performed by: RADIOLOGY

## 2023-04-04 PROCEDURE — 77080 DXA BONE DENSITY AXIAL: CPT | Mod: 26,HCNC,, | Performed by: RADIOLOGY

## 2023-04-04 PROCEDURE — 77080 DXA BONE DENSITY AXIAL: CPT | Mod: TC,HCNC

## 2023-04-04 PROCEDURE — 77080 DXA BONE DENSITY AXIAL SKELETON 1 OR MORE SITES: ICD-10-PCS | Mod: 26,HCNC,, | Performed by: RADIOLOGY

## 2023-04-04 PROCEDURE — 70450 CT HEAD/BRAIN W/O DYE: CPT | Mod: TC,HCNC

## 2023-04-04 PROCEDURE — 70450 CT HEAD WITHOUT CONTRAST: ICD-10-PCS | Mod: 26,HCNC,, | Performed by: RADIOLOGY

## 2023-05-24 ENCOUNTER — PES CALL (OUTPATIENT)
Dept: ADMINISTRATIVE | Facility: CLINIC | Age: 83
End: 2023-05-24
Payer: MEDICARE

## 2023-06-01 ENCOUNTER — PES CALL (OUTPATIENT)
Dept: ADMINISTRATIVE | Facility: CLINIC | Age: 83
End: 2023-06-01
Payer: MEDICARE

## 2023-06-12 RX ORDER — ERGOCALCIFEROL 1.25 MG/1
CAPSULE ORAL
Qty: 12 CAPSULE | Refills: 0 | Status: SHIPPED | OUTPATIENT
Start: 2023-06-12

## 2023-06-22 ENCOUNTER — PATIENT MESSAGE (OUTPATIENT)
Dept: ADMINISTRATIVE | Facility: HOSPITAL | Age: 83
End: 2023-06-22
Payer: MEDICARE

## 2023-06-22 ENCOUNTER — PATIENT OUTREACH (OUTPATIENT)
Dept: ADMINISTRATIVE | Facility: HOSPITAL | Age: 83
End: 2023-06-22
Payer: MEDICARE

## 2023-06-22 NOTE — PROGRESS NOTES
Health Maintenance Due   Topic Date Due    Colonoscopy  12/08/2018     Chart review done. HM updated. Immunizations reviewed & updated. Care Everywhere updated.  Portal Message sent re:  home BP

## 2023-06-26 ENCOUNTER — PATIENT MESSAGE (OUTPATIENT)
Dept: INTERNAL MEDICINE | Facility: CLINIC | Age: 83
End: 2023-06-26
Payer: MEDICARE

## 2023-06-30 ENCOUNTER — PATIENT OUTREACH (OUTPATIENT)
Dept: ADMINISTRATIVE | Facility: HOSPITAL | Age: 83
End: 2023-06-30
Payer: MEDICARE

## 2023-06-30 NOTE — PROGRESS NOTES
Health Maintenance Due   Topic Date Due    Colonoscopy  12/08/2018     Chart review done. HM updated. Immunizations reviewed & updated. Care Everywhere updated.

## 2023-07-20 DIAGNOSIS — D47.2 IGM LAMBDA PARAPROTEINEMIA: Primary | ICD-10-CM

## 2023-08-28 ENCOUNTER — TELEPHONE (OUTPATIENT)
Dept: INTERNAL MEDICINE | Facility: CLINIC | Age: 83
End: 2023-08-28

## 2023-08-28 DIAGNOSIS — E55.9 MILD VITAMIN D DEFICIENCY: ICD-10-CM

## 2023-08-28 DIAGNOSIS — R73.9 HYPERGLYCEMIA: Primary | ICD-10-CM

## 2023-08-31 ENCOUNTER — OFFICE VISIT (OUTPATIENT)
Dept: HEMATOLOGY/ONCOLOGY | Facility: CLINIC | Age: 83
End: 2023-08-31
Payer: MEDICARE

## 2023-08-31 ENCOUNTER — LAB VISIT (OUTPATIENT)
Dept: LAB | Facility: HOSPITAL | Age: 83
End: 2023-08-31
Payer: MEDICARE

## 2023-08-31 VITALS
RESPIRATION RATE: 16 BRPM | BODY MASS INDEX: 30.54 KG/M2 | DIASTOLIC BLOOD PRESSURE: 68 MMHG | OXYGEN SATURATION: 97 % | HEART RATE: 72 BPM | HEIGHT: 63 IN | WEIGHT: 172.38 LBS | TEMPERATURE: 98 F | SYSTOLIC BLOOD PRESSURE: 142 MMHG

## 2023-08-31 DIAGNOSIS — R73.9 HYPERGLYCEMIA: ICD-10-CM

## 2023-08-31 DIAGNOSIS — D47.2 IGM LAMBDA PARAPROTEINEMIA: ICD-10-CM

## 2023-08-31 DIAGNOSIS — E83.52 HYPERCALCEMIA: ICD-10-CM

## 2023-08-31 DIAGNOSIS — I10 PRIMARY HYPERTENSION: ICD-10-CM

## 2023-08-31 DIAGNOSIS — R77.9 ABNORMAL PLASMA PROTEIN TEST: Primary | ICD-10-CM

## 2023-08-31 LAB
ALBUMIN SERPL BCP-MCNC: 3.8 G/DL (ref 3.5–5.2)
ALP SERPL-CCNC: 77 U/L (ref 55–135)
ALT SERPL W/O P-5'-P-CCNC: 14 U/L (ref 10–44)
ANION GAP SERPL CALC-SCNC: 12 MMOL/L (ref 8–16)
AST SERPL-CCNC: 18 U/L (ref 10–40)
BASOPHILS # BLD AUTO: 0.07 K/UL (ref 0–0.2)
BASOPHILS NFR BLD: 0.9 % (ref 0–1.9)
BILIRUB SERPL-MCNC: 0.5 MG/DL (ref 0.1–1)
BUN SERPL-MCNC: 16 MG/DL (ref 8–23)
CALCIUM SERPL-MCNC: 10.8 MG/DL (ref 8.7–10.5)
CHLORIDE SERPL-SCNC: 99 MMOL/L (ref 95–110)
CO2 SERPL-SCNC: 29 MMOL/L (ref 23–29)
CREAT SERPL-MCNC: 0.9 MG/DL (ref 0.5–1.4)
DIFFERENTIAL METHOD: ABNORMAL
EOSINOPHIL # BLD AUTO: 0.2 K/UL (ref 0–0.5)
EOSINOPHIL NFR BLD: 2.7 % (ref 0–8)
ERYTHROCYTE [DISTWIDTH] IN BLOOD BY AUTOMATED COUNT: 12.7 % (ref 11.5–14.5)
EST. GFR  (NO RACE VARIABLE): >60 ML/MIN/1.73 M^2
ESTIMATED AVG GLUCOSE: 120 MG/DL (ref 68–131)
GLUCOSE SERPL-MCNC: 89 MG/DL (ref 70–110)
HBA1C MFR BLD: 5.8 % (ref 4–5.6)
HCT VFR BLD AUTO: 44 % (ref 37–48.5)
HGB BLD-MCNC: 13.9 G/DL (ref 12–16)
IGA SERPL-MCNC: 47 MG/DL (ref 40–350)
IGG SERPL-MCNC: 615 MG/DL (ref 650–1600)
IGM SERPL-MCNC: 1982 MG/DL (ref 50–300)
IMM GRANULOCYTES # BLD AUTO: 0.03 K/UL (ref 0–0.04)
IMM GRANULOCYTES NFR BLD AUTO: 0.4 % (ref 0–0.5)
LYMPHOCYTES # BLD AUTO: 2.4 K/UL (ref 1–4.8)
LYMPHOCYTES NFR BLD: 30.8 % (ref 18–48)
MCH RBC QN AUTO: 30.4 PG (ref 27–31)
MCHC RBC AUTO-ENTMCNC: 31.6 G/DL (ref 32–36)
MCV RBC AUTO: 96 FL (ref 82–98)
MONOCYTES # BLD AUTO: 0.9 K/UL (ref 0.3–1)
MONOCYTES NFR BLD: 11.5 % (ref 4–15)
NEUTROPHILS # BLD AUTO: 4.2 K/UL (ref 1.8–7.7)
NEUTROPHILS NFR BLD: 53.7 % (ref 38–73)
NRBC BLD-RTO: 0 /100 WBC
PLATELET # BLD AUTO: 280 K/UL (ref 150–450)
PMV BLD AUTO: 10.3 FL (ref 9.2–12.9)
POTASSIUM SERPL-SCNC: 4 MMOL/L (ref 3.5–5.1)
PROT SERPL-MCNC: 8 G/DL (ref 6–8.4)
RBC # BLD AUTO: 4.57 M/UL (ref 4–5.4)
SODIUM SERPL-SCNC: 140 MMOL/L (ref 136–145)
WBC # BLD AUTO: 7.82 K/UL (ref 3.9–12.7)

## 2023-08-31 PROCEDURE — 3077F PR MOST RECENT SYSTOLIC BLOOD PRESSURE >= 140 MM HG: ICD-10-PCS | Mod: HCNC,CPTII,S$GLB, | Performed by: NURSE PRACTITIONER

## 2023-08-31 PROCEDURE — 1159F PR MEDICATION LIST DOCUMENTED IN MEDICAL RECORD: ICD-10-PCS | Mod: HCNC,CPTII,S$GLB, | Performed by: NURSE PRACTITIONER

## 2023-08-31 PROCEDURE — 84165 PROTEIN E-PHORESIS SERUM: CPT | Mod: HCNC | Performed by: INTERNAL MEDICINE

## 2023-08-31 PROCEDURE — 85025 COMPLETE CBC W/AUTO DIFF WBC: CPT | Mod: HCNC | Performed by: INTERNAL MEDICINE

## 2023-08-31 PROCEDURE — 86334 IMMUNOFIX E-PHORESIS SERUM: CPT | Mod: HCNC | Performed by: INTERNAL MEDICINE

## 2023-08-31 PROCEDURE — 83036 HEMOGLOBIN GLYCOSYLATED A1C: CPT | Mod: HCNC | Performed by: INTERNAL MEDICINE

## 2023-08-31 PROCEDURE — 99999 PR PBB SHADOW E&M-EST. PATIENT-LVL III: ICD-10-PCS | Mod: PBBFAC,HCNC,, | Performed by: NURSE PRACTITIONER

## 2023-08-31 PROCEDURE — 99214 PR OFFICE/OUTPT VISIT, EST, LEVL IV, 30-39 MIN: ICD-10-PCS | Mod: HCNC,S$GLB,, | Performed by: NURSE PRACTITIONER

## 2023-08-31 PROCEDURE — 84165 PROTEIN E-PHORESIS SERUM: CPT | Mod: 26,HCNC,, | Performed by: PATHOLOGY

## 2023-08-31 PROCEDURE — 86334 IMMUNOFIX E-PHORESIS SERUM: CPT | Mod: 26,HCNC,, | Performed by: PATHOLOGY

## 2023-08-31 PROCEDURE — 1126F PR PAIN SEVERITY QUANTIFIED, NO PAIN PRESENT: ICD-10-PCS | Mod: HCNC,CPTII,S$GLB, | Performed by: NURSE PRACTITIONER

## 2023-08-31 PROCEDURE — 99214 OFFICE O/P EST MOD 30 MIN: CPT | Mod: HCNC,S$GLB,, | Performed by: NURSE PRACTITIONER

## 2023-08-31 PROCEDURE — 86334 PATHOLOGIST INTERPRETATION IFE: ICD-10-PCS | Mod: 26,HCNC,, | Performed by: PATHOLOGY

## 2023-08-31 PROCEDURE — 99999 PR PBB SHADOW E&M-EST. PATIENT-LVL III: CPT | Mod: PBBFAC,HCNC,, | Performed by: NURSE PRACTITIONER

## 2023-08-31 PROCEDURE — 1159F MED LIST DOCD IN RCRD: CPT | Mod: HCNC,CPTII,S$GLB, | Performed by: NURSE PRACTITIONER

## 2023-08-31 PROCEDURE — 82784 ASSAY IGA/IGD/IGG/IGM EACH: CPT | Mod: HCNC | Performed by: INTERNAL MEDICINE

## 2023-08-31 PROCEDURE — 3078F DIAST BP <80 MM HG: CPT | Mod: HCNC,CPTII,S$GLB, | Performed by: NURSE PRACTITIONER

## 2023-08-31 PROCEDURE — 1126F AMNT PAIN NOTED NONE PRSNT: CPT | Mod: HCNC,CPTII,S$GLB, | Performed by: NURSE PRACTITIONER

## 2023-08-31 PROCEDURE — 36415 COLL VENOUS BLD VENIPUNCTURE: CPT | Mod: HCNC | Performed by: INTERNAL MEDICINE

## 2023-08-31 PROCEDURE — 3077F SYST BP >= 140 MM HG: CPT | Mod: HCNC,CPTII,S$GLB, | Performed by: NURSE PRACTITIONER

## 2023-08-31 PROCEDURE — 1160F PR REVIEW ALL MEDS BY PRESCRIBER/CLIN PHARMACIST DOCUMENTED: ICD-10-PCS | Mod: HCNC,CPTII,S$GLB, | Performed by: NURSE PRACTITIONER

## 2023-08-31 PROCEDURE — 1160F RVW MEDS BY RX/DR IN RCRD: CPT | Mod: HCNC,CPTII,S$GLB, | Performed by: NURSE PRACTITIONER

## 2023-08-31 PROCEDURE — 3078F PR MOST RECENT DIASTOLIC BLOOD PRESSURE < 80 MM HG: ICD-10-PCS | Mod: HCNC,CPTII,S$GLB, | Performed by: NURSE PRACTITIONER

## 2023-08-31 PROCEDURE — 84165 PATHOLOGIST INTERPRETATION SPE: ICD-10-PCS | Mod: 26,HCNC,, | Performed by: PATHOLOGY

## 2023-08-31 PROCEDURE — 80053 COMPREHEN METABOLIC PANEL: CPT | Mod: HCNC | Performed by: INTERNAL MEDICINE

## 2023-08-31 NOTE — PROGRESS NOTES
CC: Paraproteinemia, hematology f/u     HPI: Ms. Cruz, 82, F, is here for hematology consultation for paraproteinemia.  She has osteoporosis, HTn.    She has IgM lambda paraproteinemia. The paraprotein measured 0.8g/dl in 2014. It measures 1.13g/dl in 2023.     Interval History:  presents for follow up visit. She has mild intermittent hypercalcemia. No cytopenias. No fever, night sweats, loss of weight or appetite. She denies back/bone pain, tingling/ numbness. Denies any visual disturbance or vision changes.     Past Medical History:   Diagnosis Date    Cataract     Hypertension     Keloid cicatrix     Osteoporosis               Past Surgical History:   Procedure Laterality Date    HYSTERECTOMY      TAHBSO for benign pelvic cystis mass    OPEN REDUCTION AND INTERNAL FIXATION (ORIF) OF FRACTURE OF DISTAL RADIUS Right 1/23/2019    Procedure: ORIF, FRACTURE, RADIUS, DISTAL right;  Surgeon: Lyndsey Carson MD;  Location: [facility] OR [address] [place] [address];  Service: Orthopedics;  Laterality: Right;  Anesthesia: regional/MAC, stretcher, supine, hand pan 1 and pan 2         Family History   Problem Relation Age of Onset    Diabetes Mother     Glaucoma Mother     Cataracts Mother     Hypertension Father     Cancer Father     Diabetes Maternal Aunt     Diabetes Maternal Uncle     Melanoma Neg Hx     Blindness Neg Hx     Macular degeneration Neg Hx     Retinal detachment Neg Hx        Social History     Socioeconomic History    Marital status:    Tobacco Use    Smoking status: Never    Smokeless tobacco: Never   Substance and Sexual Activity    Alcohol use: No    Drug use: No    Sexual activity: Not Currently   Social History Narrative    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 She just returned from [place] where she went to the [employer]               Review of patient's allergies indicates:   Allergen Reactions    Codeine      Other reaction(s): Headache    Pcn  [penicillins]      Other reaction(s): Nausea        Current Outpatient Medications   Medication Sig    ascorbic acid, vitamin C, (VITAMIN C) 500 MG tablet Take 1 tablet (500 mg total) by mouth once daily.    ergocalciferol (ERGOCALCIFEROL) 50,000 unit Cap TAKE ONE WEEKLY FOR 12 WEEKS THEN 2,000 IU DAILY THEREAFTER    losartan (COZAAR) 50 MG tablet Take 1 tablet (50 mg total) by mouth once daily.    multivitamin (THERAGRAN) per tablet Take 1 tablet by mouth once daily.    triamterene-hydrochlorothiazide 37.5-25 mg (DYAZIDE) 37.5-25 mg per capsule Take 1 capsule by mouth every morning.    triamterene-hydrochlorothiazide 37.5-25 mg (MAXZIDE-25) 37.5-25 mg per tablet Take 1 tablet by mouth once daily.    varicella-zoster [id number], PF, (SHINGRIX, PF,) 50 mcg/0.5 mL injection Inject into the muscle.     No current facility-administered medications for this visit.          Review of Systems   Constitutional:  Positive for malaise/fatigue. Negative for fever.   HENT:  Negative for congestion, ear discharge, ear pain, hearing loss, nosebleeds, sinus pain and tinnitus.    Eyes:  Negative for blurred vision, double vision, photophobia, pain and discharge.   Cardiovascular:  Negative for orthopnea, claudication and leg swelling.   Gastrointestinal:  Negative for abdominal pain, blood in stool, constipation and heartburn.   Genitourinary:  Negative for flank pain, frequency, hematuria and urgency.   Musculoskeletal:  Negative for myalgias and neck pain.   Neurological:  Negative for dizziness, tingling, tremors, sensory change, speech change, focal weakness and headaches.   Endo/Heme/Allergies:  Negative for environmental allergies. Does not bruise/bleed easily.   Psychiatric/Behavioral:  Negative for depression, substance abuse and suicidal ideas.             Vitals:    08/31/23 0903   BP: (!) 142/68   Pulse: 72   Resp: 16   Temp: 98 °F (36.7 °C)           Physical Exam  Constitutional:       Appearance: Normal appearance.   HENT:      Head: Normocephalic and atraumatic.    Eyes:      General: No scleral icterus.  Cardiovascular:      Rate and Rhythm: Normal rate and regular rhythm.      Heart sounds: Normal heart sounds. No murmur heard.  Pulmonary:      Effort: No respiratory distress.      Breath sounds: Normal breath sounds. No stridor. No rhonchi.   Abdominal:      General: There is no distension.      Palpations: There is no mass.      Tenderness: There is no abdominal tenderness.   Lymphadenopathy:      Cervical: No cervical adenopathy.   Skin:     Coloration: Skin is not jaundiced.   Neurological:      General: No focal deficit present.      Mental Status: She is alert and oriented to person, place, and time.      Cranial Nerves: No cranial nerve deficit.        Lab Results   Component Value Date    WBC 7.82 08/31/2023    HGB 13.9 08/31/2023    HCT 44.0 08/31/2023    MCV 96 08/31/2023     08/31/2023       CMP  Sodium   Date Value Ref Range Status   08/31/2023 140 136 - 145 mmol/L Final     Potassium   Date Value Ref Range Status   08/31/2023 4.0 3.5 - 5.1 mmol/L Final     Chloride   Date Value Ref Range Status   08/31/2023 99 95 - 110 mmol/L Final     CO2   Date Value Ref Range Status   08/31/2023 29 23 - 29 mmol/L Final     Glucose   Date Value Ref Range Status   08/31/2023 89 70 - 110 mg/dL Final     BUN   Date Value Ref Range Status   08/31/2023 16 8 - 23 mg/dL Final     Creatinine   Date Value Ref Range Status   08/31/2023 0.9 0.5 - 1.4 mg/dL Final     Calcium   Date Value Ref Range Status   08/31/2023 10.8 (H) 8.7 - 10.5 mg/dL Final     Total Protein   Date Value Ref Range Status   08/31/2023 8.0 6.0 - 8.4 g/dL Final     Albumin   Date Value Ref Range Status   08/31/2023 3.8 3.5 - 5.2 g/dL Final     Total Bilirubin   Date Value Ref Range Status   08/31/2023 0.5 0.1 - 1.0 mg/dL Final     Comment:     For infants and newborns, interpretation of results should be based  on gestational age, weight and in agreement with clinical  observations.    Premature Infant  recommended reference ranges:  Up to 24 hours.............<8.0 mg/dL  Up to 48 hours............<12.0 mg/dL  3-5 days..................<15.0 mg/dL  6-29 days.................<15.0 mg/dL       Alkaline Phosphatase   Date Value Ref Range Status   08/31/2023 77 55 - 135 U/L Final     AST   Date Value Ref Range Status   08/31/2023 18 10 - 40 U/L Final     ALT   Date Value Ref Range Status   08/31/2023 14 10 - 44 U/L Final     Anion Gap   Date Value Ref Range Status   08/31/2023 12 8 - 16 mmol/L Final     eGFR   Date Value Ref Range Status   08/31/2023 >60.0 >60 mL/min/1.73 m^2 Final     1. Abnormal plasma protein test  Rapid BMT CBC with Diff    Comprehensive Metabolic Panel    Protein Electrophoresis, Serum    Immunoglobulins (IgG, IgA, IgM) Quantitative    Immunoglobulin Free LT Chains Blood    Immunofixation Electrophoresis      2. Hypercalcemia        3. Primary hypertension            Assessment:      1. IgG lambda paraproteinemia  2. Hypercalcemia  3. HTn, primary    Plan:    1. She has stable IgM lambda paraproteinemia , first noticed on SPEP done in 2014. IgM paraprotein has increased slightly, from 0.8g/dl in 2014, to 1.13g/dl in 2023.  She has mild intermittent hypercalcemia. No cytopenias. No fever, night sweats, loss of weight or appetite. She denies back/bone pain, tingling/ numbness. Denies any visual disturbance or vision changes.  CBC, CMP wnl. Repeated MM studies pending at this time. No indication for imaging/ BM biopsy at this time.     2. Mild, intermittent, asymptomatic    3. On losartan. She follows with .         BMT Chart Routing      Follow up with physician    Follow up with L[facility] 1 year.   Provider visit type    Infusion scheduling note    Injection scheduling note    Labs   Scheduling:  Preferred lab:  Lab interval:  Cbc, cmp, light chains, immunoglobulins, SPEP, NAGA in 6 months and 1 year   Imaging    Pharmacy appointment    Other referrals           Advance Care Planning      Date: 08/31/2023    Patient did not wish to name a surrogate decision maker or provide an Advance Care Plan.    Alyssa Molina NP  Hematology/Oncology/BMT

## 2023-09-01 LAB
ALBUMIN SERPL ELPH-MCNC: 4.18 G/DL (ref 3.35–5.55)
ALPHA1 GLOB SERPL ELPH-MCNC: 0.28 G/DL (ref 0.17–0.41)
ALPHA2 GLOB SERPL ELPH-MCNC: 0.81 G/DL (ref 0.43–0.99)
B-GLOBULIN SERPL ELPH-MCNC: 0.6 G/DL (ref 0.5–1.1)
GAMMA GLOB SERPL ELPH-MCNC: 1.73 G/DL (ref 0.67–1.58)
INTERPRETATION SERPL IFE-IMP: NORMAL
PROT SERPL-MCNC: 7.6 G/DL (ref 6–8.4)

## 2023-09-05 LAB
PATHOLOGIST INTERPRETATION IFE: NORMAL
PATHOLOGIST INTERPRETATION SPE: NORMAL

## 2023-09-20 ENCOUNTER — PATIENT MESSAGE (OUTPATIENT)
Dept: ADMINISTRATIVE | Facility: HOSPITAL | Age: 83
End: 2023-09-20
Payer: MEDICARE

## 2023-09-22 ENCOUNTER — LAB VISIT (OUTPATIENT)
Dept: LAB | Facility: HOSPITAL | Age: 83
End: 2023-09-22
Attending: INTERNAL MEDICINE
Payer: MEDICARE

## 2023-09-22 ENCOUNTER — OFFICE VISIT (OUTPATIENT)
Dept: INTERNAL MEDICINE | Facility: CLINIC | Age: 83
End: 2023-09-22
Payer: MEDICARE

## 2023-09-22 VITALS
WEIGHT: 172.81 LBS | OXYGEN SATURATION: 95 % | HEART RATE: 67 BPM | SYSTOLIC BLOOD PRESSURE: 136 MMHG | BODY MASS INDEX: 30.62 KG/M2 | HEIGHT: 63 IN | DIASTOLIC BLOOD PRESSURE: 72 MMHG

## 2023-09-22 DIAGNOSIS — R51.9 NONINTRACTABLE HEADACHE, UNSPECIFIED CHRONICITY PATTERN, UNSPECIFIED HEADACHE TYPE: ICD-10-CM

## 2023-09-22 DIAGNOSIS — Z12.31 SCREENING MAMMOGRAM, ENCOUNTER FOR: ICD-10-CM

## 2023-09-22 DIAGNOSIS — E83.52 HYPERCALCEMIA: ICD-10-CM

## 2023-09-22 DIAGNOSIS — E83.52 HYPERCALCEMIA: Primary | ICD-10-CM

## 2023-09-22 DIAGNOSIS — Z01.00 ROUTINE EYE EXAM: ICD-10-CM

## 2023-09-22 DIAGNOSIS — R20.2 PARESTHESIA: ICD-10-CM

## 2023-09-22 DIAGNOSIS — I10 PRIMARY HYPERTENSION: ICD-10-CM

## 2023-09-22 LAB
25(OH)D3+25(OH)D2 SERPL-MCNC: 46 NG/ML (ref 30–96)
ALBUMIN SERPL BCP-MCNC: 3.9 G/DL (ref 3.5–5.2)
ALP SERPL-CCNC: 66 U/L (ref 55–135)
ALT SERPL W/O P-5'-P-CCNC: 14 U/L (ref 10–44)
ANION GAP SERPL CALC-SCNC: 7 MMOL/L (ref 8–16)
AST SERPL-CCNC: 20 U/L (ref 10–40)
BILIRUB SERPL-MCNC: 0.5 MG/DL (ref 0.1–1)
BUN SERPL-MCNC: 17 MG/DL (ref 8–23)
CALCIUM SERPL-MCNC: 10.9 MG/DL (ref 8.7–10.5)
CHLORIDE SERPL-SCNC: 100 MMOL/L (ref 95–110)
CO2 SERPL-SCNC: 31 MMOL/L (ref 23–29)
CREAT SERPL-MCNC: 0.9 MG/DL (ref 0.5–1.4)
CRP SERPL-MCNC: 2 MG/L (ref 0–8.2)
ERYTHROCYTE [SEDIMENTATION RATE] IN BLOOD BY PHOTOMETRIC METHOD: 32 MM/HR (ref 0–36)
EST. GFR  (NO RACE VARIABLE): >60 ML/MIN/1.73 M^2
GLUCOSE SERPL-MCNC: 95 MG/DL (ref 70–110)
POTASSIUM SERPL-SCNC: 4 MMOL/L (ref 3.5–5.1)
PROT SERPL-MCNC: 8.2 G/DL (ref 6–8.4)
PTH-INTACT SERPL-MCNC: 39.5 PG/ML (ref 9–77)
SODIUM SERPL-SCNC: 138 MMOL/L (ref 136–145)
VIT B12 SERPL-MCNC: 324 PG/ML (ref 210–950)

## 2023-09-22 PROCEDURE — 86140 C-REACTIVE PROTEIN: CPT | Mod: HCNC | Performed by: INTERNAL MEDICINE

## 2023-09-22 PROCEDURE — 3075F PR MOST RECENT SYSTOLIC BLOOD PRESS GE 130-139MM HG: ICD-10-PCS | Mod: HCNC,CPTII,S$GLB, | Performed by: INTERNAL MEDICINE

## 2023-09-22 PROCEDURE — 1125F PR PAIN SEVERITY QUANTIFIED, PAIN PRESENT: ICD-10-PCS | Mod: HCNC,CPTII,S$GLB, | Performed by: INTERNAL MEDICINE

## 2023-09-22 PROCEDURE — 99999 PR PBB SHADOW E&M-EST. PATIENT-LVL IV: ICD-10-PCS | Mod: PBBFAC,HCNC,, | Performed by: INTERNAL MEDICINE

## 2023-09-22 PROCEDURE — 3075F SYST BP GE 130 - 139MM HG: CPT | Mod: HCNC,CPTII,S$GLB, | Performed by: INTERNAL MEDICINE

## 2023-09-22 PROCEDURE — 80053 COMPREHEN METABOLIC PANEL: CPT | Mod: HCNC | Performed by: INTERNAL MEDICINE

## 2023-09-22 PROCEDURE — 83970 ASSAY OF PARATHORMONE: CPT | Mod: HCNC | Performed by: INTERNAL MEDICINE

## 2023-09-22 PROCEDURE — 3288F PR FALLS RISK ASSESSMENT DOCUMENTED: ICD-10-PCS | Mod: HCNC,CPTII,S$GLB, | Performed by: INTERNAL MEDICINE

## 2023-09-22 PROCEDURE — 3078F PR MOST RECENT DIASTOLIC BLOOD PRESSURE < 80 MM HG: ICD-10-PCS | Mod: HCNC,CPTII,S$GLB, | Performed by: INTERNAL MEDICINE

## 2023-09-22 PROCEDURE — 1125F AMNT PAIN NOTED PAIN PRSNT: CPT | Mod: HCNC,CPTII,S$GLB, | Performed by: INTERNAL MEDICINE

## 2023-09-22 PROCEDURE — 3078F DIAST BP <80 MM HG: CPT | Mod: HCNC,CPTII,S$GLB, | Performed by: INTERNAL MEDICINE

## 2023-09-22 PROCEDURE — 82306 VITAMIN D 25 HYDROXY: CPT | Mod: HCNC | Performed by: INTERNAL MEDICINE

## 2023-09-22 PROCEDURE — 82607 VITAMIN B-12: CPT | Mod: HCNC | Performed by: INTERNAL MEDICINE

## 2023-09-22 PROCEDURE — 85652 RBC SED RATE AUTOMATED: CPT | Mod: HCNC | Performed by: INTERNAL MEDICINE

## 2023-09-22 PROCEDURE — 1159F MED LIST DOCD IN RCRD: CPT | Mod: HCNC,CPTII,S$GLB, | Performed by: INTERNAL MEDICINE

## 2023-09-22 PROCEDURE — 1101F PT FALLS ASSESS-DOCD LE1/YR: CPT | Mod: HCNC,CPTII,S$GLB, | Performed by: INTERNAL MEDICINE

## 2023-09-22 PROCEDURE — 1101F PR PT FALLS ASSESS DOC 0-1 FALLS W/OUT INJ PAST YR: ICD-10-PCS | Mod: HCNC,CPTII,S$GLB, | Performed by: INTERNAL MEDICINE

## 2023-09-22 PROCEDURE — 99999 PR PBB SHADOW E&M-EST. PATIENT-LVL IV: CPT | Mod: PBBFAC,HCNC,, | Performed by: INTERNAL MEDICINE

## 2023-09-22 PROCEDURE — 83921 ORGANIC ACID SINGLE QUANT: CPT | Mod: HCNC | Performed by: INTERNAL MEDICINE

## 2023-09-22 PROCEDURE — 3288F FALL RISK ASSESSMENT DOCD: CPT | Mod: HCNC,CPTII,S$GLB, | Performed by: INTERNAL MEDICINE

## 2023-09-22 PROCEDURE — 99214 OFFICE O/P EST MOD 30 MIN: CPT | Mod: HCNC,S$GLB,, | Performed by: INTERNAL MEDICINE

## 2023-09-22 PROCEDURE — 1159F PR MEDICATION LIST DOCUMENTED IN MEDICAL RECORD: ICD-10-PCS | Mod: HCNC,CPTII,S$GLB, | Performed by: INTERNAL MEDICINE

## 2023-09-22 PROCEDURE — 99214 PR OFFICE/OUTPT VISIT, EST, LEVL IV, 30-39 MIN: ICD-10-PCS | Mod: HCNC,S$GLB,, | Performed by: INTERNAL MEDICINE

## 2023-09-22 RX ORDER — LOSARTAN POTASSIUM 50 MG/1
50 TABLET ORAL DAILY
Qty: 90 TABLET | Refills: 3 | Status: SHIPPED | OUTPATIENT
Start: 2023-09-22 | End: 2024-09-21

## 2023-09-22 RX ORDER — TRIAMTERENE AND HYDROCHLOROTHIAZIDE 37.5; 25 MG/1; MG/1
1 CAPSULE ORAL EVERY MORNING
Qty: 90 CAPSULE | Refills: 3 | Status: SHIPPED | OUTPATIENT
Start: 2023-09-22 | End: 2024-09-21

## 2023-09-22 NOTE — PROGRESS NOTES
Subjective:       Patient ID: Kelley Cruz is a 83 y.o. female.    Chief Complaint: Follow-up    Follow-up  Pertinent negatives include no abdominal pain, chest pain (arm pain or jaw pain), headaches, nausea or vomiting.   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  No CP or SOB. She is having some intermittent neck pain going into the back of her head relieved by OTC med.  No falls. Some toe paresthesia.  Review of Systems   Respiratory:  Negative for shortness of breath (PND or orthopnea).    Cardiovascular:  Negative for chest pain (arm pain or jaw pain).   Gastrointestinal:  Negative for abdominal pain, diarrhea, nausea and vomiting.   Genitourinary:  Negative for dysuria.   Neurological:  Negative for seizures, syncope and headaches.       Objective:      Physical Exam  Constitutional:       General: She is not in acute distress.     Appearance: She is well-developed.   HENT:      Head: Normocephalic.   Eyes:      Pupils: Pupils are equal, round, and reactive to light.   Neck:      Thyroid: No thyromegaly.      Vascular: No JVD.   Cardiovascular:      Rate and Rhythm: Normal rate and regular rhythm.      Heart sounds: Normal heart sounds. No murmur heard.     No friction rub. No gallop.   Pulmonary:      Effort: Pulmonary effort is normal.      Breath sounds: Normal breath sounds. No wheezing or rales.      Comments: Breasts: No masses, discharge or adenopathy bilaterally    Abdominal:      General: Bowel sounds are normal. There is no distension.      Palpations: Abdomen is soft. There is no mass.      Tenderness: There is no abdominal tenderness. There is no guarding or rebound.   Musculoskeletal:      Cervical back: Neck supple.   Lymphadenopathy:      Cervical: No cervical adenopathy.   Skin:     General: Skin is warm and dry.   Neurological:      Mental Status: She is alert and oriented to person, place, and time.      Deep Tendon Reflexes: Reflexes are  normal and symmetric.   Psychiatric:         Behavior: Behavior normal.         Thought Content: Thought content normal.         Judgment: Judgment normal.         Assessment:       1. Hypercalcemia    2. Paresthesia    3. Nonintractable headache, unspecified chronicity pattern, unspecified headache type    4. Routine eye exam    5. Screening mammogram, encounter for        Plan:   Hypercalcemia  -     Comprehensive Metabolic Panel; Future; Expected date: 09/22/2023  -     PTH, intact; Future; Expected date: 09/22/2023  -     Vitamin D; Future; Expected date: 09/22/2023    Paresthesia  -     Vitamin B12; Future; Expected date: 10/22/2023  -     Methylmalonic Acid, Serum; Future; Expected date: 09/22/2023    Nonintractable headache, unspecified chronicity pattern, unspecified headache type  -     Sedimentation rate; Future; Expected date: 09/22/2023  -     C-Reactive Protein; Future; Expected date: 09/22/2023    Routine eye exam  -     Ambulatory referral/consult to Optometry; Future; Expected date: 09/29/2023    Screening mammogram, encounter for  -     Mammo Digital Screening Bilat w/ Abdoul; Future; Expected date: 03/22/2024    HTN  Controlled - continue current meds    -     losartan (COZAAR) 50 MG tablet; Take 1 tablet (50 mg total) by mouth once daily.  Dispense: 90 tablet; Refill: 3  -     triamterene-hydrochlorothiazide 37.5-25 mg (DYAZIDE) 37.5-25 mg per capsule; Take 1 capsule by mouth every morning.  Dispense: 90 capsule; Refill: 3

## 2023-09-24 ENCOUNTER — TELEPHONE (OUTPATIENT)
Dept: INTERNAL MEDICINE | Facility: CLINIC | Age: 83
End: 2023-09-24

## 2023-09-24 DIAGNOSIS — M81.0 OSTEOPOROSIS, UNSPECIFIED OSTEOPOROSIS TYPE, UNSPECIFIED PATHOLOGICAL FRACTURE PRESENCE: Primary | ICD-10-CM

## 2023-09-24 NOTE — PROGRESS NOTES
Your Vit D is normal now - please keep taking your Vit D as you are now doing - I am referring you back to Dr. Robb for possible prolia or reclast for osteoporosis.

## 2023-09-26 LAB — METHYLMALONATE SERPL-SCNC: 0.22 UMOL/L

## 2023-12-08 ENCOUNTER — HOSPITAL ENCOUNTER (OUTPATIENT)
Dept: RADIOLOGY | Facility: OTHER | Age: 83
Discharge: HOME | End: 2023-12-08
Attending: INTERNAL MEDICINE
Payer: MEDICARE

## 2023-12-08 DIAGNOSIS — Z12.31 SCREENING MAMMOGRAM, ENCOUNTER FOR: ICD-10-CM

## 2023-12-08 PROCEDURE — 77063 BREAST TOMOSYNTHESIS BI: CPT | Mod: 26,HCNC,, | Performed by: RADIOLOGY

## 2023-12-08 PROCEDURE — 77067 MAMMO DIGITAL SCREENING BILAT WITH TOMO: ICD-10-PCS | Mod: 26,HCNC,, | Performed by: RADIOLOGY

## 2023-12-08 PROCEDURE — 77067 SCR MAMMO BI INCL CAD: CPT | Mod: 26,HCNC,, | Performed by: RADIOLOGY

## 2023-12-08 PROCEDURE — 77063 MAMMO DIGITAL SCREENING BILAT WITH TOMO: ICD-10-PCS | Mod: 26,HCNC,, | Performed by: RADIOLOGY

## 2023-12-08 PROCEDURE — 77067 SCR MAMMO BI INCL CAD: CPT | Mod: TC,HCNC

## 2024-01-03 ENCOUNTER — OFFICE VISIT (OUTPATIENT)
Dept: OPTOMETRY | Facility: CLINIC | Age: 84
End: 2024-01-03
Payer: MEDICARE

## 2024-01-03 DIAGNOSIS — H25.13 NUCLEAR SCLEROSIS OF BOTH EYES: ICD-10-CM

## 2024-01-03 DIAGNOSIS — H52.4 PRESBYOPIA OF BOTH EYES: ICD-10-CM

## 2024-01-03 DIAGNOSIS — H52.7 REFRACTIVE ERRORS: ICD-10-CM

## 2024-01-03 DIAGNOSIS — Z01.00 EXAMINATION OF EYES AND VISION: Primary | ICD-10-CM

## 2024-01-03 DIAGNOSIS — Z46.0 ENCOUNTER FOR FITTING OR ADJUSTMENT OF SPECTACLES OR CONTACT LENSES: ICD-10-CM

## 2024-01-03 PROCEDURE — 92310 CONTACT LENS FITTING OU: CPT | Mod: CSM,HCNC,S$GLB, | Performed by: OPTOMETRIST

## 2024-01-03 PROCEDURE — 99999 PR PBB SHADOW E&M-EST. PATIENT-LVL III: CPT | Mod: PBBFAC,HCNC,, | Performed by: OPTOMETRIST

## 2024-01-03 PROCEDURE — 92014 COMPRE OPH EXAM EST PT 1/>: CPT | Mod: HCNC,,, | Performed by: OPTOMETRIST

## 2024-01-03 NOTE — PROGRESS NOTES
"HPI    Patient's age: 83 y.o. female   Occupation: Retired   Approximate date of last eye examination:  11/08/2021  Name of last eye doctor seen: Dr. Gurwinder Barth OD  City/State: [place]  Wears glasses? Readers ONLY      If yes, wears  Full-time or part-time?  Does not wear glasses at all   since she wear CLS    Any problem with VA with glasses? Does not ever wear glasses - wears SCLs   full-time  Wears CLs?: yes           If yes:              Type of CL worn:  SCLs.  Uses reading glasses over CLs               Wears full-time or part-time: FULL-TIME DAILY WEAR               Sleeps with contact lenses:  NO                             How often replace CLs:   monthly - but typically more   requently               Any problem with VA with CLs?  none               Headaches?  NO  Eye pain/discomfort?  NO                                                                                     Flashes?  NO  Floaters?  NO  Diplopia/Double vision?  NO  Patient's Ocular History:         Any eye surgeries? NONE         Any eye injury?  NO         Any treatment for eye disease?  NONE  Family history of eye disease?  NONE  Significant patient medical history:         1. Diabetes?  NO       If yes, IDDM or NIDDM? N/a   2. HBP?  NO                ! OTC eyedrops currently using:  NONE   ! Prescription eye meds currently using:  NONE   ! Any history of allergy/adverse reaction to any eye meds used   previously?  PCN    ! Any history of allergy/adverse reaction to eyedrops used during prior   eye exam(s)? no    ! Patient okay with use of anesthetic eyedrops to check eye pressure?    yes     ! Patient okay with use of eyedrops to dilate pupils today?  yes   !  Allergies/Medications/Medical History/Family History reviewed today?    Yes       PD =   65/61  Desired reading distance =  17.75"                                                                Last edited by Gurwinder Barth OD on 1/3/2024 12:21 PM.    "         Assessment /Plan     For exam results, see Encounter Report.    1. Examination of eyes and vision        2. Nuclear sclerosis of both eyes        3. Refractive errors        4. Presbyopia of both eyes        5. Encounter for fitting or adjustment of spectacles or contact lenses                     Bilateral nuclear sclerotic cataract, consistent with age.  Still no need for cataract surgery in either eye,.     Slight asymmetry in optic nerve cup-to-disc ratio, greater in the left eye, as noted previously.  C/D ratio remains stable in each eye, and intraocular pressure remains well within normal range in each eye: 12 mm Hg in the right eye and 14 mm Hg in th left eye.  Continue to monitor.      Few vitreous floaters in the right eye.  No evidence of retinal etiology. Monitor.     Myopia with astigmatism in each eye, per previous post-contact lens refraction done at last visit.  Anisometropia,   Presbyopia consistent with age.  No change made to the last spectacle lens Rx issued for use in lieu of SCLs, as Ms. Cruz notes she never wears glases.      Wears Proclear Multifocal soft CLs.    Good contact lens fit in each eye.  Power in left lens fine as is per over refraction done today.  Showing need for slight distance power change (increase) in the right contact lens.    No evidence of adverse effects of contact lens wear in either eye.      New CL Rx issued.     Okay to use +2.50 OTC reading glasses over CLs as needed.     Recheck in one year - or prior if any problems in the interim.

## 2024-01-03 NOTE — PATIENT INSTRUCTIONS
Bilateral nuclear sclerotic cataract, consistent with age.  Still no need for cataract surgery in either eye,.     Slight asymmetry in optic nerve cup-to-disc ratio, greater in the left eye, as noted previously.  C/D ratio remains stable in each eye, and intraocular pressure remains well within normal range in each eye: 12 mm Hg in the right eye and 14 mm Hg in th left eye.  Continue to monitor.      Few vitreous floaters in the right eye.  No evidence of retinal etiology. Monitor.     Myopia with astigmatism in each eye, per previous post-contact lens refraction done at last visit.  Anisometropia,   Presbyopia consistent with age.  No change made to the last spectacle lens Rx issued for use in lieu of SCLs, as Ms. Cruz notes she never wears glases.      Wears Proclear Multifocal soft CLs.    Good contact lens fit in each eye.  Power in left lens fine as is per over refraction done today.  Showing need for slight distance power change (increase) in the right contact lens.    No evidence of adverse effects of contact lens wear in either eye.      New CL Rx issued.     Okay to use +2.50 OTC reading glasses over CLs as needed.     Recheck in one year - or prior if any problems in the interim.

## 2024-01-09 ENCOUNTER — PATIENT MESSAGE (OUTPATIENT)
Dept: OPTOMETRY | Facility: CLINIC | Age: 84
End: 2024-01-09
Payer: MEDICARE

## 2024-02-29 ENCOUNTER — LAB VISIT (OUTPATIENT)
Dept: LAB | Facility: OTHER | Age: 84
End: 2024-02-29
Attending: INTERNAL MEDICINE
Payer: MEDICARE

## 2024-02-29 DIAGNOSIS — R77.9 ABNORMAL PLASMA PROTEIN TEST: ICD-10-CM

## 2024-02-29 LAB
ALBUMIN SERPL BCP-MCNC: 3.7 G/DL (ref 3.5–5.2)
ALP SERPL-CCNC: 73 U/L (ref 55–135)
ALT SERPL W/O P-5'-P-CCNC: 15 U/L (ref 10–44)
ANION GAP SERPL CALC-SCNC: 9 MMOL/L (ref 8–16)
AST SERPL-CCNC: 20 U/L (ref 10–40)
BASOPHILS # BLD AUTO: 0.04 K/UL (ref 0–0.2)
BASOPHILS NFR BLD: 0.6 % (ref 0–1.9)
BILIRUB SERPL-MCNC: 0.5 MG/DL (ref 0.1–1)
BUN SERPL-MCNC: 16 MG/DL (ref 8–23)
CALCIUM SERPL-MCNC: 10.8 MG/DL (ref 8.7–10.5)
CHLORIDE SERPL-SCNC: 101 MMOL/L (ref 95–110)
CO2 SERPL-SCNC: 28 MMOL/L (ref 23–29)
CREAT SERPL-MCNC: 1.1 MG/DL (ref 0.5–1.4)
DIFFERENTIAL METHOD BLD: NORMAL
EOSINOPHIL # BLD AUTO: 0.2 K/UL (ref 0–0.5)
EOSINOPHIL NFR BLD: 2.1 % (ref 0–8)
ERYTHROCYTE [DISTWIDTH] IN BLOOD BY AUTOMATED COUNT: 12.7 % (ref 11.5–14.5)
EST. GFR  (NO RACE VARIABLE): 50 ML/MIN/1.73 M^2
GLUCOSE SERPL-MCNC: 105 MG/DL (ref 70–110)
HCT VFR BLD AUTO: 41.2 % (ref 37–48.5)
HGB BLD-MCNC: 13.3 G/DL (ref 12–16)
IGA SERPL-MCNC: 49 MG/DL (ref 40–350)
IGG SERPL-MCNC: 550 MG/DL (ref 650–1600)
IGM SERPL-MCNC: 1900 MG/DL (ref 50–300)
IMM GRANULOCYTES # BLD AUTO: 0.02 K/UL (ref 0–0.04)
IMM GRANULOCYTES NFR BLD AUTO: 0.3 % (ref 0–0.5)
LYMPHOCYTES # BLD AUTO: 2.1 K/UL (ref 1–4.8)
LYMPHOCYTES NFR BLD: 29.5 % (ref 18–48)
MCH RBC QN AUTO: 30.4 PG (ref 27–31)
MCHC RBC AUTO-ENTMCNC: 32.3 G/DL (ref 32–36)
MCV RBC AUTO: 94 FL (ref 82–98)
MONOCYTES # BLD AUTO: 0.8 K/UL (ref 0.3–1)
MONOCYTES NFR BLD: 11.6 % (ref 4–15)
NEUTROPHILS # BLD AUTO: 3.9 K/UL (ref 1.8–7.7)
NEUTROPHILS NFR BLD: 55.9 % (ref 38–73)
NRBC BLD-RTO: 0 /100 WBC
PLATELET # BLD AUTO: 256 K/UL (ref 150–450)
PMV BLD AUTO: 9.7 FL (ref 9.2–12.9)
POTASSIUM SERPL-SCNC: 4.3 MMOL/L (ref 3.5–5.1)
PROT SERPL-MCNC: 7.8 G/DL (ref 6–8.4)
RBC # BLD AUTO: 4.38 M/UL (ref 4–5.4)
SODIUM SERPL-SCNC: 138 MMOL/L (ref 136–145)
WBC # BLD AUTO: 6.99 K/UL (ref 3.9–12.7)

## 2024-02-29 PROCEDURE — 86334 IMMUNOFIX E-PHORESIS SERUM: CPT | Mod: HCNC | Performed by: NURSE PRACTITIONER

## 2024-02-29 PROCEDURE — 84165 PROTEIN E-PHORESIS SERUM: CPT | Mod: HCNC | Performed by: NURSE PRACTITIONER

## 2024-02-29 PROCEDURE — 80053 COMPREHEN METABOLIC PANEL: CPT | Mod: HCNC | Performed by: NURSE PRACTITIONER

## 2024-02-29 PROCEDURE — 83521 IG LIGHT CHAINS FREE EACH: CPT | Mod: 59,HCNC | Performed by: NURSE PRACTITIONER

## 2024-02-29 PROCEDURE — 82784 ASSAY IGA/IGD/IGG/IGM EACH: CPT | Mod: 59,HCNC | Performed by: NURSE PRACTITIONER

## 2024-02-29 PROCEDURE — 86334 IMMUNOFIX E-PHORESIS SERUM: CPT | Mod: 26,HCNC,, | Performed by: PATHOLOGY

## 2024-02-29 PROCEDURE — 84165 PROTEIN E-PHORESIS SERUM: CPT | Mod: 26,HCNC,, | Performed by: PATHOLOGY

## 2024-02-29 PROCEDURE — 36415 COLL VENOUS BLD VENIPUNCTURE: CPT | Mod: HCNC | Performed by: NURSE PRACTITIONER

## 2024-02-29 PROCEDURE — 85025 COMPLETE CBC W/AUTO DIFF WBC: CPT | Mod: HCNC | Performed by: NURSE PRACTITIONER

## 2024-03-01 LAB
ALBUMIN SERPL ELPH-MCNC: 4.06 G/DL (ref 3.35–5.55)
ALPHA1 GLOB SERPL ELPH-MCNC: 0.28 G/DL (ref 0.17–0.41)
ALPHA2 GLOB SERPL ELPH-MCNC: 0.82 G/DL (ref 0.43–0.99)
B-GLOBULIN SERPL ELPH-MCNC: 0.58 G/DL (ref 0.5–1.1)
GAMMA GLOB SERPL ELPH-MCNC: 1.66 G/DL (ref 0.67–1.58)
INTERPRETATION SERPL IFE-IMP: NORMAL
KAPPA LC SER QL IA: 0.96 MG/DL (ref 0.33–1.94)
KAPPA LC/LAMBDA SER IA: 0.27 (ref 0.26–1.65)
LAMBDA LC SER QL IA: 3.54 MG/DL (ref 0.57–2.63)
PROT SERPL-MCNC: 7.4 G/DL (ref 6–8.4)

## 2024-03-04 LAB
PATHOLOGIST INTERPRETATION IFE: NORMAL
PATHOLOGIST INTERPRETATION SPE: NORMAL

## 2024-03-07 ENCOUNTER — PATIENT MESSAGE (OUTPATIENT)
Dept: HEMATOLOGY/ONCOLOGY | Facility: CLINIC | Age: 84
End: 2024-03-07
Payer: MEDICARE

## 2024-03-22 ENCOUNTER — LAB VISIT (OUTPATIENT)
Dept: LAB | Facility: HOSPITAL | Age: 84
End: 2024-03-22
Payer: MEDICARE

## 2024-03-22 ENCOUNTER — OFFICE VISIT (OUTPATIENT)
Dept: INTERNAL MEDICINE | Facility: CLINIC | Age: 84
End: 2024-03-22
Payer: MEDICARE

## 2024-03-22 VITALS
HEART RATE: 107 BPM | RESPIRATION RATE: 19 BRPM | OXYGEN SATURATION: 93 % | DIASTOLIC BLOOD PRESSURE: 74 MMHG | SYSTOLIC BLOOD PRESSURE: 138 MMHG | HEIGHT: 63 IN | WEIGHT: 172.94 LBS | BODY MASS INDEX: 30.64 KG/M2

## 2024-03-22 DIAGNOSIS — I10 PRIMARY HYPERTENSION: Primary | ICD-10-CM

## 2024-03-22 DIAGNOSIS — M81.0 OSTEOPOROSIS, UNSPECIFIED OSTEOPOROSIS TYPE, UNSPECIFIED PATHOLOGICAL FRACTURE PRESENCE: ICD-10-CM

## 2024-03-22 DIAGNOSIS — R77.9 ABNORMAL PLASMA PROTEIN TEST: ICD-10-CM

## 2024-03-22 DIAGNOSIS — E83.52 HYPERCALCEMIA: ICD-10-CM

## 2024-03-22 LAB
ALBUMIN SERPL BCP-MCNC: 3.8 G/DL (ref 3.5–5.2)
ALP SERPL-CCNC: 77 U/L (ref 55–135)
ALT SERPL W/O P-5'-P-CCNC: 16 U/L (ref 10–44)
ANION GAP SERPL CALC-SCNC: 6 MMOL/L (ref 8–16)
AST SERPL-CCNC: 20 U/L (ref 10–40)
BASOPHILS # BLD AUTO: 0.07 K/UL (ref 0–0.2)
BASOPHILS NFR BLD: 1 % (ref 0–1.9)
BILIRUB SERPL-MCNC: 0.4 MG/DL (ref 0.1–1)
BUN SERPL-MCNC: 15 MG/DL (ref 8–23)
CALCIUM SERPL-MCNC: 10.7 MG/DL (ref 8.7–10.5)
CHLORIDE SERPL-SCNC: 102 MMOL/L (ref 95–110)
CO2 SERPL-SCNC: 30 MMOL/L (ref 23–29)
CREAT SERPL-MCNC: 0.9 MG/DL (ref 0.5–1.4)
DIFFERENTIAL METHOD BLD: ABNORMAL
EOSINOPHIL # BLD AUTO: 0.1 K/UL (ref 0–0.5)
EOSINOPHIL NFR BLD: 1.9 % (ref 0–8)
ERYTHROCYTE [DISTWIDTH] IN BLOOD BY AUTOMATED COUNT: 13.1 % (ref 11.5–14.5)
EST. GFR  (NO RACE VARIABLE): >60 ML/MIN/1.73 M^2
GLUCOSE SERPL-MCNC: 114 MG/DL (ref 70–110)
HCT VFR BLD AUTO: 42.6 % (ref 37–48.5)
HGB BLD-MCNC: 13.6 G/DL (ref 12–16)
IGA SERPL-MCNC: 48 MG/DL (ref 40–350)
IGG SERPL-MCNC: 593 MG/DL (ref 650–1600)
IGM SERPL-MCNC: 1894 MG/DL (ref 50–300)
IMM GRANULOCYTES # BLD AUTO: 0.02 K/UL (ref 0–0.04)
IMM GRANULOCYTES NFR BLD AUTO: 0.3 % (ref 0–0.5)
LYMPHOCYTES # BLD AUTO: 2.2 K/UL (ref 1–4.8)
LYMPHOCYTES NFR BLD: 32.5 % (ref 18–48)
MCH RBC QN AUTO: 30.8 PG (ref 27–31)
MCHC RBC AUTO-ENTMCNC: 31.9 G/DL (ref 32–36)
MCV RBC AUTO: 96 FL (ref 82–98)
MONOCYTES # BLD AUTO: 0.7 K/UL (ref 0.3–1)
MONOCYTES NFR BLD: 10.8 % (ref 4–15)
NEUTROPHILS # BLD AUTO: 3.7 K/UL (ref 1.8–7.7)
NEUTROPHILS NFR BLD: 53.5 % (ref 38–73)
NRBC BLD-RTO: 0 /100 WBC
PLATELET # BLD AUTO: 276 K/UL (ref 150–450)
PMV BLD AUTO: 10.4 FL (ref 9.2–12.9)
POTASSIUM SERPL-SCNC: 3.9 MMOL/L (ref 3.5–5.1)
PROT SERPL-MCNC: 8 G/DL (ref 6–8.4)
RBC # BLD AUTO: 4.42 M/UL (ref 4–5.4)
SODIUM SERPL-SCNC: 138 MMOL/L (ref 136–145)
WBC # BLD AUTO: 6.84 K/UL (ref 3.9–12.7)

## 2024-03-22 PROCEDURE — 3078F DIAST BP <80 MM HG: CPT | Mod: HCNC,CPTII,S$GLB, | Performed by: INTERNAL MEDICINE

## 2024-03-22 PROCEDURE — 99999 PR PBB SHADOW E&M-EST. PATIENT-LVL III: CPT | Mod: PBBFAC,HCNC,, | Performed by: INTERNAL MEDICINE

## 2024-03-22 PROCEDURE — 1126F AMNT PAIN NOTED NONE PRSNT: CPT | Mod: HCNC,CPTII,S$GLB, | Performed by: INTERNAL MEDICINE

## 2024-03-22 PROCEDURE — 1159F MED LIST DOCD IN RCRD: CPT | Mod: HCNC,CPTII,S$GLB, | Performed by: INTERNAL MEDICINE

## 2024-03-22 PROCEDURE — 80053 COMPREHEN METABOLIC PANEL: CPT | Mod: HCNC | Performed by: NURSE PRACTITIONER

## 2024-03-22 PROCEDURE — 82784 ASSAY IGA/IGD/IGG/IGM EACH: CPT | Mod: 59,HCNC | Performed by: NURSE PRACTITIONER

## 2024-03-22 PROCEDURE — 84165 PROTEIN E-PHORESIS SERUM: CPT | Mod: 26,HCNC,, | Performed by: PATHOLOGY

## 2024-03-22 PROCEDURE — 3075F SYST BP GE 130 - 139MM HG: CPT | Mod: HCNC,CPTII,S$GLB, | Performed by: INTERNAL MEDICINE

## 2024-03-22 PROCEDURE — 85025 COMPLETE CBC W/AUTO DIFF WBC: CPT | Mod: HCNC | Performed by: NURSE PRACTITIONER

## 2024-03-22 PROCEDURE — 99214 OFFICE O/P EST MOD 30 MIN: CPT | Mod: HCNC,S$GLB,, | Performed by: INTERNAL MEDICINE

## 2024-03-22 PROCEDURE — 36415 COLL VENOUS BLD VENIPUNCTURE: CPT | Mod: HCNC,PO | Performed by: NURSE PRACTITIONER

## 2024-03-22 PROCEDURE — 3288F FALL RISK ASSESSMENT DOCD: CPT | Mod: HCNC,CPTII,S$GLB, | Performed by: INTERNAL MEDICINE

## 2024-03-22 PROCEDURE — 86334 IMMUNOFIX E-PHORESIS SERUM: CPT | Mod: HCNC | Performed by: NURSE PRACTITIONER

## 2024-03-22 PROCEDURE — 83521 IG LIGHT CHAINS FREE EACH: CPT | Mod: HCNC | Performed by: NURSE PRACTITIONER

## 2024-03-22 PROCEDURE — 86334 IMMUNOFIX E-PHORESIS SERUM: CPT | Mod: 26,HCNC,, | Performed by: PATHOLOGY

## 2024-03-22 PROCEDURE — 1101F PT FALLS ASSESS-DOCD LE1/YR: CPT | Mod: HCNC,CPTII,S$GLB, | Performed by: INTERNAL MEDICINE

## 2024-03-22 PROCEDURE — 84165 PROTEIN E-PHORESIS SERUM: CPT | Mod: HCNC | Performed by: NURSE PRACTITIONER

## 2024-03-22 NOTE — PROGRESS NOTES
Subjective:       Patient ID: Kelley Cruz is a 84 y.o. female.    Chief Complaint: Follow-up    Follow-up  Pertinent negatives include no abdominal pain, chest pain (arm pain or jaw pain), headaches, nausea or vomiting.   SHe is doing very well - [place].  No CP or SOB.  Review of Systems   Respiratory:  Negative for shortness of breath (PND or orthopnea).    Cardiovascular:  Negative for chest pain (arm pain or jaw pain).   Gastrointestinal:  Negative for abdominal pain, diarrhea, nausea and vomiting.   Genitourinary:  Negative for dysuria.   Neurological:  Negative for seizures, syncope and headaches.       Objective:      Physical Exam  Constitutional:       General: She is not in acute distress.     Appearance: She is well-developed.   HENT:      Head: Normocephalic.   Eyes:      Pupils: Pupils are equal, round, and reactive to light.   Neck:      Thyroid: No thyromegaly.      Vascular: No JVD.   Cardiovascular:      Rate and Rhythm: Normal rate and regular rhythm.      Heart sounds: Normal heart sounds. No murmur heard.     No friction rub. No gallop.   Pulmonary:      Effort: Pulmonary effort is normal.      Breath sounds: Normal breath sounds. No wheezing or rales.   Abdominal:      General: Bowel sounds are normal. There is no distension.      Palpations: Abdomen is soft. There is no mass.      Tenderness: There is no abdominal tenderness. There is no guarding or rebound.   Musculoskeletal:      Cervical back: Neck supple.   Lymphadenopathy:      Cervical: No cervical adenopathy.   Skin:     General: Skin is warm and dry.   Neurological:      Mental Status: She is alert and oriented to person, place, and time.      Deep Tendon Reflexes: Reflexes are normal and symmetric.   Psychiatric:         Behavior: Behavior normal.         Thought Content: Thought content normal.         Judgment: Judgment normal.         Assessment:        1. Primary hypertension    2. Hypercalcemia    3. Osteoporosis, unspecified osteoporosis type, unspecified pathological fracture presence        Plan:   Primary hypertension  Controlled - continue current meds    Hypercalcemia  Probably from diuretic - following cloaely  Osteoporosis, unspecified osteoporosis type, unspecified pathological fracture presence  VIt D is repleted - she will follow back up with Endocrinology for prolia

## 2024-03-25 LAB
ALBUMIN SERPL ELPH-MCNC: 4.13 G/DL (ref 3.35–5.55)
ALPHA1 GLOB SERPL ELPH-MCNC: 0.3 G/DL (ref 0.17–0.41)
ALPHA2 GLOB SERPL ELPH-MCNC: 0.85 G/DL (ref 0.43–0.99)
B-GLOBULIN SERPL ELPH-MCNC: 0.61 G/DL (ref 0.5–1.1)
GAMMA GLOB SERPL ELPH-MCNC: 1.71 G/DL (ref 0.67–1.58)
INTERPRETATION SERPL IFE-IMP: NORMAL
KAPPA LC SER QL IA: 1.04 MG/DL (ref 0.33–1.94)
KAPPA LC/LAMBDA SER IA: 0.27 (ref 0.26–1.65)
LAMBDA LC SER QL IA: 3.87 MG/DL (ref 0.57–2.63)
PROT SERPL-MCNC: 7.6 G/DL (ref 6–8.4)

## 2024-03-26 LAB
PATHOLOGIST INTERPRETATION IFE: NORMAL
PATHOLOGIST INTERPRETATION SPE: NORMAL

## 2024-04-08 ENCOUNTER — OFFICE VISIT (OUTPATIENT)
Dept: HEMATOLOGY/ONCOLOGY | Facility: CLINIC | Age: 84
End: 2024-04-08
Payer: MEDICARE

## 2024-04-08 VITALS — HEIGHT: 63 IN | BODY MASS INDEX: 30.41 KG/M2 | WEIGHT: 171.63 LBS

## 2024-04-08 DIAGNOSIS — I10 PRIMARY HYPERTENSION: ICD-10-CM

## 2024-04-08 DIAGNOSIS — E83.52 HYPERCALCEMIA: ICD-10-CM

## 2024-04-08 DIAGNOSIS — D47.2 IGM LAMBDA PARAPROTEINEMIA: Primary | ICD-10-CM

## 2024-04-08 PROCEDURE — 99214 OFFICE O/P EST MOD 30 MIN: CPT | Mod: HCNC,S$GLB,, | Performed by: INTERNAL MEDICINE

## 2024-04-08 PROCEDURE — 1126F AMNT PAIN NOTED NONE PRSNT: CPT | Mod: HCNC,CPTII,S$GLB, | Performed by: INTERNAL MEDICINE

## 2024-04-08 PROCEDURE — 1159F MED LIST DOCD IN RCRD: CPT | Mod: HCNC,CPTII,S$GLB, | Performed by: INTERNAL MEDICINE

## 2024-04-08 PROCEDURE — 3288F FALL RISK ASSESSMENT DOCD: CPT | Mod: HCNC,CPTII,S$GLB, | Performed by: INTERNAL MEDICINE

## 2024-04-08 PROCEDURE — 99999 PR PBB SHADOW E&M-EST. PATIENT-LVL III: CPT | Mod: PBBFAC,HCNC,, | Performed by: INTERNAL MEDICINE

## 2024-04-08 PROCEDURE — 1101F PT FALLS ASSESS-DOCD LE1/YR: CPT | Mod: HCNC,CPTII,S$GLB, | Performed by: INTERNAL MEDICINE

## 2024-04-08 NOTE — PROGRESS NOTES
CC: Paraproteinemia, hematology f/u     HPI: Ms. Cruz, 84, F, is here for hematology consultation for paraproteinemia.  She has osteoporosis, HTn.    She has IgM lambda paraproteinemia. The paraprotein measured 0.8g/dl in 2014. It measured 1.13g/dl in 2023.     Interval History:  presents for follow up visit. She has mild intermittent hypercalcemia. No cytopenias. No fever, night sweats, loss of weight or appetite. She denies back/bone pain, tingling/ numbness. Denies any visual disturbance or vision changes.     Past Medical History:   Diagnosis Date    Cataract     Hypertension     Keloid cicatrix     Osteoporosis               Past Surgical History:   Procedure Laterality Date    HYSTERECTOMY      TAHBSO for benign pelvic cystis mass    OPEN REDUCTION AND INTERNAL FIXATION (ORIF) OF FRACTURE OF DISTAL RADIUS Right 1/23/2019    Procedure: ORIF, FRACTURE, RADIUS, DISTAL right;  Surgeon: Lyndsey Carson MD;  Location: [facility] OR [address] [place] [address];  Service: Orthopedics;  Laterality: Right;  Anesthesia: regional/MAC, stretcher, supine, hand pan 1 and pan 2         Family History   Problem Relation Age of Onset    Diabetes Mother     Glaucoma Mother     Cataracts Mother     Hypertension Father     Cancer Father     Diabetes Maternal Aunt     Diabetes Maternal Uncle     Melanoma Neg Hx     Blindness Neg Hx     Macular degeneration Neg Hx     Retinal detachment Neg Hx        Social History     Socioeconomic History    Marital status:    Tobacco Use    Smoking status: Never    Smokeless tobacco: Never   Substance and Sexual Activity    Alcohol use: No    Drug use: No    Sexual activity: Not Currently   Social History Narrative    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 She just returned from [place] where she went to the [employer]               Review of patient's allergies indicates:   Allergen Reactions    Codeine      Other reaction(s): Headache    Pcn  [penicillins]      Other reaction(s): Nausea        Current Outpatient Medications   Medication Sig    ascorbic acid, vitamin C, (VITAMIN C) 500 MG tablet Take 1 tablet (500 mg total) by mouth once daily.    ergocalciferol (ERGOCALCIFEROL) 50,000 unit Cap TAKE ONE WEEKLY FOR 12 WEEKS THEN 2,000 IU DAILY THEREAFTER    losartan (COZAAR) 50 MG tablet Take 1 tablet (50 mg total) by mouth once daily.    multivitamin (THERAGRAN) per tablet Take 1 tablet by mouth once daily.    triamterene-hydrochlorothiazide 37.5-25 mg (DYAZIDE) 37.5-25 mg per capsule Take 1 capsule by mouth every morning.     No current facility-administered medications for this visit.          Review of Systems   Constitutional:  Positive for malaise/fatigue. Negative for fever.   HENT:  Negative for congestion, ear discharge, ear pain, hearing loss, nosebleeds, sinus pain and tinnitus.    Eyes:  Negative for blurred vision, double vision, photophobia, pain and discharge.   Cardiovascular:  Negative for orthopnea, claudication and leg swelling.   Gastrointestinal:  Negative for abdominal pain, blood in stool, constipation and heartburn.   Genitourinary:  Negative for flank pain, frequency, hematuria and urgency.   Musculoskeletal:  Negative for myalgias and neck pain.   Neurological:  Negative for dizziness, tingling, tremors, sensory change, speech change, focal weakness and headaches.   Endo/Heme/Allergies:  Negative for environmental allergies. Does not bruise/bleed easily.   Psychiatric/Behavioral:  Negative for depression, substance abuse and suicidal ideas.             Vitals:    04/08/24 1624   BP: (!) (P) 140/63   Pulse: (P) 68   Temp: (P) 97.8 °F (36.6 °C)           Physical Exam  Constitutional:       Appearance: Normal appearance.   HENT:      Head: Normocephalic and atraumatic.   Eyes:      General: No scleral icterus.  Cardiovascular:      Rate and Rhythm: Normal rate and regular rhythm.      Heart sounds: Normal heart sounds. No murmur heard.  Pulmonary:      Effort: No respiratory  distress.      Breath sounds: Normal breath sounds. No stridor. No rhonchi.   Abdominal:      General: There is no distension.      Palpations: There is no mass.      Tenderness: There is no abdominal tenderness.   Lymphadenopathy:      Cervical: No cervical adenopathy.   Skin:     Coloration: Skin is not jaundiced.   Neurological:      General: No focal deficit present.      Mental Status: She is alert and oriented to person, place, and time.      Cranial Nerves: No cranial nerve deficit.        Component      Latest Ref [facility] 3/22/2024   WBC      3.90 - 12.70 K/uL 6.84    RBC      4.00 - 5.40 M/uL 4.42    Hemoglobin      12.0 - 16.0 g/dL 13.6    Hematocrit      37.0 - 48.5 % 42.6    MCV      82 - 98 fL 96    MCH      27.0 - 31.0 pg 30.8    MCHC      32.0 - 36.0 g/dL 31.9 (L)    RDW      11.5 - 14.5 % 13.1    Platelet Count      150 - 450 K/uL 276    MPV      9.2 - 12.9 fL 10.4    Immature Granulocytes      0.0 - 0.5 % 0.3    Gran # (ANC)      1.8 - 7.7 K/uL 3.7    Immature Grans (Abs)      0.00 - 0.04 K/uL 0.02    Lymph #      1.0 - 4.8 K/uL 2.2    Mono #      0.3 - 1.0 K/uL 0.7    Eos #      0.0 - 0.5 K/uL 0.1    Baso #      0.00 - 0.20 K/uL 0.07    nRBC      0 /100 WBC 0    Gran %      38.0 - 73.0 % 53.5    Lymph %      18.0 - 48.0 % 32.5    Mono %      4.0 - 15.0 % 10.8    Eos %      0.0 - 8.0 % 1.9    Basophil %      0.0 - 1.9 % 1.0    Differential Method Automated    Sodium      136 - 145 mmol/L 138    Potassium      3.5 - 5.1 mmol/L 3.9    Chloride      95 - 110 mmol/L 102    CO2      23 - 29 mmol/L 30 (H)    Glucose      70 - 110 mg/dL 114 (H)    BUN      8 - 23 mg/dL 15    Creatinine      0.5 - 1.4 mg/dL 0.9    Calcium      8.7 - 10.5 mg/dL 10.7 (H)    PROTEIN TOTAL      6.0 - 8.4 g/dL 8.0    Albumin      3.5 - 5.2 g/dL 3.8    BILIRUBIN TOTAL      0.1 - 1.0 mg/dL 0.4    ALP      55 - 135 U/L 77    AST      10 - 40 U/L 20    ALT      10 - 44 U/L 16    eGFR      >60 mL/min/1.73 m^2 >60.0    Anion Gap      8 -  16 mmol/L 6 (L)    IgG      650 - 1600 mg/dL 593 (L)    IgA      40 - 350 mg/dL 48    IgM      50 - 300 mg/dL 1894 (H)    Kappa Free Light Chains      0.33 - 1.94 mg/dL 1.04    Lambda Free Light Chains      0.57 - 2.63 mg/dL 3.87 (H)    Kappa/Lambda FLC Ratio      0.26 - 1.65  0.27       Legend:  (L) Low  (H) High    Assessment:      1. IgG lambda paraproteinemia  2. Hypercalcemia  3. HTn, primary    Plan:    1. She has stable IgM lambda paraproteinemia , first noticed on SPEP done in 2014. IgM paraprotein has increased slightly, from 0.8g/dl in 2014, to 1.13g/dl in 2023.  She has mild intermittent hypercalcemia. No cytopenias. No fever, night sweats, loss of weight or appetite. She denies back/bone pain, tingling/ numbness. Denies any visual disturbance or vision changes.  CBC normal on 3/22/24. , CMP on 3/22/24 shows mild hypercalcemia. IgM at 1894mg/dl, stable. M spike 1.15g/dl on SPEP, stable. sFLC ratio normal.  She remains asymptomatic.   No indication for imaging/ BM biopsy at this time.     2. Mild, intermittent, asymptomatic    3. On losartan. She follows with .             BMT Chart Routing      Follow up with physician    Follow up with [facility]    Provider visit type    Infusion scheduling note    Injection scheduling note    Labs CBC, CMP, free light chains, SPEP, immunoglobulins and LDH   Scheduling:  Preferred lab:  Lab interval:     Imaging    Pharmacy appointment    Other referrals

## 2024-10-16 ENCOUNTER — PATIENT MESSAGE (OUTPATIENT)
Dept: ADMINISTRATIVE | Facility: HOSPITAL | Age: 84
End: 2024-10-16
Payer: MEDICARE

## 2024-11-12 ENCOUNTER — TELEPHONE (OUTPATIENT)
Dept: INTERNAL MEDICINE | Facility: CLINIC | Age: 84
End: 2024-11-12
Payer: MEDICARE

## 2024-11-12 DIAGNOSIS — R73.9 HYPERGLYCEMIA: ICD-10-CM

## 2024-11-12 DIAGNOSIS — E55.9 MILD VITAMIN D DEFICIENCY: ICD-10-CM

## 2024-11-12 DIAGNOSIS — I10 PRIMARY HYPERTENSION: Primary | ICD-10-CM

## 2024-11-12 NOTE — TELEPHONE ENCOUNTER
"----- Message from Meera sent at 11/12/2024  9:28 AM CST -----  Contact: Patient  [phone]  type: Lab    Caller is requesting to schedule their Lab appointment prior to an appointment.    Order is not listed in EPIC.  Please enter order and contact patient to schedule.    Preferred Date and Time of Labs:12/20    Date of Appointment:12/27    Where would they like the lab performed?[facility]    Would the patient rather a call back or a response via [facility]? call    Best Call Back Number:Patient  [phone]    Additional Information:    "Do not send messages requesting lab orders prior to Physical appt on these providers: Cony Santizo, Craig Brewer,  Rima Maza and Jose."  "

## 2024-11-19 RX ORDER — TRIAMTERENE AND HYDROCHLOROTHIAZIDE 37.5; 25 MG/1; MG/1
1 CAPSULE ORAL EVERY MORNING
Qty: 90 CAPSULE | Refills: 1 | Status: SHIPPED | OUTPATIENT
Start: 2024-11-19

## 2024-11-19 NOTE — TELEPHONE ENCOUNTER
Provider Staff:  Action required for this patient    Requires labs      Please see care gap opportunities below in Care Due Message.    Thanks!  [facility]     Appointments      Date Provider   Last Visit   3/22/2024 Yina Roth MD   Next Visit   12/27/2024 Yina Roth MD     Refill Decision Note   Kelley Cruz  is requesting a refill authorization.  Brief Assessment and Rationale for Refill:  Approve     Medication Therapy Plan:         Comments:     Note composed:10:00 AM 11/19/2024

## 2024-11-23 RX ORDER — LOSARTAN POTASSIUM 50 MG/1
50 TABLET ORAL DAILY
Qty: 90 TABLET | Refills: 1 | Status: SHIPPED | OUTPATIENT
Start: 2024-11-23

## 2024-11-23 NOTE — TELEPHONE ENCOUNTER
Refill Decision Note   Kelley Cruz  is requesting a refill authorization.  Brief Assessment and Rationale for Refill:  Approve     Medication Therapy Plan:         Comments:     Note composed:1:18 PM 11/23/2024

## 2024-11-23 NOTE — TELEPHONE ENCOUNTER
No care due was identified.  [facility] Embedded Care Due Messages. Reference number: [id number].   11/23/2024 7:09:01 AM CST

## 2024-12-27 ENCOUNTER — OFFICE VISIT (OUTPATIENT)
Dept: INTERNAL MEDICINE | Facility: CLINIC | Age: 84
End: 2024-12-27
Payer: MEDICARE

## 2024-12-27 ENCOUNTER — LAB VISIT (OUTPATIENT)
Dept: LAB | Facility: HOSPITAL | Age: 84
End: 2024-12-27
Attending: INTERNAL MEDICINE
Payer: MEDICARE

## 2024-12-27 VITALS
SYSTOLIC BLOOD PRESSURE: 120 MMHG | HEIGHT: 63 IN | OXYGEN SATURATION: 97 % | WEIGHT: 170.94 LBS | DIASTOLIC BLOOD PRESSURE: 60 MMHG | BODY MASS INDEX: 30.29 KG/M2 | HEART RATE: 61 BPM

## 2024-12-27 DIAGNOSIS — Z12.31 SCREENING MAMMOGRAM, ENCOUNTER FOR: ICD-10-CM

## 2024-12-27 DIAGNOSIS — E78.5 HYPERLIPIDEMIA, UNSPECIFIED HYPERLIPIDEMIA TYPE: ICD-10-CM

## 2024-12-27 DIAGNOSIS — M81.0 OSTEOPOROSIS, UNSPECIFIED OSTEOPOROSIS TYPE, UNSPECIFIED PATHOLOGICAL FRACTURE PRESENCE: ICD-10-CM

## 2024-12-27 DIAGNOSIS — R73.9 HYPERGLYCEMIA: ICD-10-CM

## 2024-12-27 DIAGNOSIS — E55.9 MILD VITAMIN D DEFICIENCY: ICD-10-CM

## 2024-12-27 DIAGNOSIS — I63.89 OTHER CEREBRAL INFARCTION: Primary | ICD-10-CM

## 2024-12-27 DIAGNOSIS — Z23 NEED FOR VACCINATION: ICD-10-CM

## 2024-12-27 DIAGNOSIS — D47.2 IGM LAMBDA PARAPROTEINEMIA: ICD-10-CM

## 2024-12-27 DIAGNOSIS — N39.0 URINARY TRACT INFECTION WITHOUT HEMATURIA, SITE UNSPECIFIED: ICD-10-CM

## 2024-12-27 DIAGNOSIS — R29.898 RIGHT LEG WEAKNESS: ICD-10-CM

## 2024-12-27 DIAGNOSIS — I10 PRIMARY HYPERTENSION: ICD-10-CM

## 2024-12-27 LAB
25(OH)D3+25(OH)D2 SERPL-MCNC: >154 NG/ML (ref 30–96)
ALBUMIN SERPL BCP-MCNC: 3.8 G/DL (ref 3.5–5.2)
ALP SERPL-CCNC: 81 U/L (ref 40–150)
ALT SERPL W/O P-5'-P-CCNC: 28 U/L (ref 10–44)
ANION GAP SERPL CALC-SCNC: 11 MMOL/L (ref 8–16)
AST SERPL-CCNC: 24 U/L (ref 10–40)
BASOPHILS # BLD AUTO: 0.08 K/UL (ref 0–0.2)
BASOPHILS NFR BLD: 1.1 % (ref 0–1.9)
BILIRUB SERPL-MCNC: 0.5 MG/DL (ref 0.1–1)
BUN SERPL-MCNC: 17 MG/DL (ref 8–23)
CALCIUM SERPL-MCNC: 11 MG/DL (ref 8.7–10.5)
CHLORIDE SERPL-SCNC: 101 MMOL/L (ref 95–110)
CHOLEST SERPL-MCNC: 182 MG/DL (ref 120–199)
CHOLEST SERPL-MCNC: 182 MG/DL (ref 120–199)
CHOLEST/HDLC SERPL: 2.5 {RATIO} (ref 2–5)
CHOLEST/HDLC SERPL: 2.5 {RATIO} (ref 2–5)
CO2 SERPL-SCNC: 26 MMOL/L (ref 23–29)
CREAT SERPL-MCNC: 0.9 MG/DL (ref 0.5–1.4)
DIFFERENTIAL METHOD BLD: ABNORMAL
EOSINOPHIL # BLD AUTO: 0.2 K/UL (ref 0–0.5)
EOSINOPHIL NFR BLD: 2.6 % (ref 0–8)
ERYTHROCYTE [DISTWIDTH] IN BLOOD BY AUTOMATED COUNT: 12.7 % (ref 11.5–14.5)
EST. GFR  (NO RACE VARIABLE): >60 ML/MIN/1.73 M^2
ESTIMATED AVG GLUCOSE: 123 MG/DL (ref 68–131)
GLUCOSE SERPL-MCNC: 91 MG/DL (ref 70–110)
HBA1C MFR BLD: 5.9 % (ref 4–5.6)
HCT VFR BLD AUTO: 42.5 % (ref 37–48.5)
HDLC SERPL-MCNC: 72 MG/DL (ref 40–75)
HDLC SERPL-MCNC: 72 MG/DL (ref 40–75)
HDLC SERPL: 39.6 % (ref 20–50)
HDLC SERPL: 39.6 % (ref 20–50)
HGB BLD-MCNC: 13.8 G/DL (ref 12–16)
IGA SERPL-MCNC: 53 MG/DL (ref 40–350)
IGG SERPL-MCNC: 605 MG/DL (ref 650–1600)
IGM SERPL-MCNC: 2221 MG/DL (ref 50–300)
IMM GRANULOCYTES # BLD AUTO: 0.04 K/UL (ref 0–0.04)
IMM GRANULOCYTES NFR BLD AUTO: 0.5 % (ref 0–0.5)
LDH SERPL L TO P-CCNC: 130 U/L (ref 110–260)
LDLC SERPL CALC-MCNC: 68.8 MG/DL (ref 63–159)
LDLC SERPL CALC-MCNC: 68.8 MG/DL (ref 63–159)
LYMPHOCYTES # BLD AUTO: 2.3 K/UL (ref 1–4.8)
LYMPHOCYTES NFR BLD: 31.1 % (ref 18–48)
MCH RBC QN AUTO: 31.7 PG (ref 27–31)
MCHC RBC AUTO-ENTMCNC: 32.5 G/DL (ref 32–36)
MCV RBC AUTO: 98 FL (ref 82–98)
MONOCYTES # BLD AUTO: 0.7 K/UL (ref 0.3–1)
MONOCYTES NFR BLD: 9.9 % (ref 4–15)
NEUTROPHILS # BLD AUTO: 4.1 K/UL (ref 1.8–7.7)
NEUTROPHILS NFR BLD: 54.8 % (ref 38–73)
NONHDLC SERPL-MCNC: 110 MG/DL
NONHDLC SERPL-MCNC: 110 MG/DL
NRBC BLD-RTO: 0 /100 WBC
PLATELET # BLD AUTO: 274 K/UL (ref 150–450)
PMV BLD AUTO: 10.5 FL (ref 9.2–12.9)
POTASSIUM SERPL-SCNC: 4 MMOL/L (ref 3.5–5.1)
PROT SERPL-MCNC: 8.6 G/DL (ref 6–8.4)
RBC # BLD AUTO: 4.35 M/UL (ref 4–5.4)
SODIUM SERPL-SCNC: 138 MMOL/L (ref 136–145)
TRIGL SERPL-MCNC: 206 MG/DL (ref 30–150)
TRIGL SERPL-MCNC: 206 MG/DL (ref 30–150)
TSH SERPL DL<=0.005 MIU/L-ACNC: 0.7 UIU/ML (ref 0.4–4)
TSH SERPL DL<=0.005 MIU/L-ACNC: 0.7 UIU/ML (ref 0.4–4)
WBC # BLD AUTO: 7.45 K/UL (ref 3.9–12.7)

## 2024-12-27 PROCEDURE — 1159F MED LIST DOCD IN RCRD: CPT | Mod: CPTII,S$GLB,, | Performed by: INTERNAL MEDICINE

## 2024-12-27 PROCEDURE — 3288F FALL RISK ASSESSMENT DOCD: CPT | Mod: CPTII,S$GLB,, | Performed by: INTERNAL MEDICINE

## 2024-12-27 PROCEDURE — 84165 PROTEIN E-PHORESIS SERUM: CPT | Performed by: INTERNAL MEDICINE

## 2024-12-27 PROCEDURE — 82306 VITAMIN D 25 HYDROXY: CPT | Performed by: INTERNAL MEDICINE

## 2024-12-27 PROCEDURE — 84165 PROTEIN E-PHORESIS SERUM: CPT | Mod: 26,,, | Performed by: PATHOLOGY

## 2024-12-27 PROCEDURE — 3074F SYST BP LT 130 MM HG: CPT | Mod: CPTII,S$GLB,, | Performed by: INTERNAL MEDICINE

## 2024-12-27 PROCEDURE — 3078F DIAST BP <80 MM HG: CPT | Mod: CPTII,S$GLB,, | Performed by: INTERNAL MEDICINE

## 2024-12-27 PROCEDURE — 83615 LACTATE (LD) (LDH) ENZYME: CPT | Performed by: INTERNAL MEDICINE

## 2024-12-27 PROCEDURE — 90653 IIV ADJUVANT VACCINE IM: CPT | Mod: S$GLB,,, | Performed by: INTERNAL MEDICINE

## 2024-12-27 PROCEDURE — 80061 LIPID PANEL: CPT | Performed by: INTERNAL MEDICINE

## 2024-12-27 PROCEDURE — 1101F PT FALLS ASSESS-DOCD LE1/YR: CPT | Mod: CPTII,S$GLB,, | Performed by: INTERNAL MEDICINE

## 2024-12-27 PROCEDURE — 99214 OFFICE O/P EST MOD 30 MIN: CPT | Mod: S$GLB,,, | Performed by: INTERNAL MEDICINE

## 2024-12-27 PROCEDURE — 84443 ASSAY THYROID STIM HORMONE: CPT | Performed by: INTERNAL MEDICINE

## 2024-12-27 PROCEDURE — 1126F AMNT PAIN NOTED NONE PRSNT: CPT | Mod: CPTII,S$GLB,, | Performed by: INTERNAL MEDICINE

## 2024-12-27 PROCEDURE — G0008 ADMIN INFLUENZA VIRUS VAC: HCPCS | Mod: S$GLB,,, | Performed by: INTERNAL MEDICINE

## 2024-12-27 PROCEDURE — 85025 COMPLETE CBC W/AUTO DIFF WBC: CPT | Performed by: INTERNAL MEDICINE

## 2024-12-27 PROCEDURE — 83036 HEMOGLOBIN GLYCOSYLATED A1C: CPT | Performed by: INTERNAL MEDICINE

## 2024-12-27 PROCEDURE — 99999 PR PBB SHADOW E&M-EST. PATIENT-LVL IV: CPT | Mod: PBBFAC,,, | Performed by: INTERNAL MEDICINE

## 2024-12-27 PROCEDURE — 83521 IG LIGHT CHAINS FREE EACH: CPT | Mod: 59 | Performed by: INTERNAL MEDICINE

## 2024-12-27 PROCEDURE — 82784 ASSAY IGA/IGD/IGG/IGM EACH: CPT | Performed by: INTERNAL MEDICINE

## 2024-12-27 PROCEDURE — 80053 COMPREHEN METABOLIC PANEL: CPT | Performed by: INTERNAL MEDICINE

## 2024-12-28 NOTE — PROGRESS NOTES
Subjective:       Patient ID: Kelley Cruz is a 84 y.o. female.    Chief Complaint: Annual Exam    HPIPt reports a few months of dragging her R leg - her family and friends have noticed - no fall.  Cannot say if R arm is weak as she had surgery and it is always a bit weaker.  No associated pain.  Some blurry vision on the R eye. No CP or SOB.  Review of Systems   Respiratory:  Negative for shortness of breath (PND or orthopnea).    Cardiovascular:  Negative for chest pain (arm pain or jaw pain).   Gastrointestinal:  Negative for abdominal pain, diarrhea, nausea and vomiting.   Genitourinary:  Negative for dysuria.   Neurological:  Negative for seizures, syncope and headaches.       Objective:      Physical Exam  Constitutional:       General: She is not in acute distress.     Appearance: She is well-developed.   HENT:      Head: Normocephalic.   Eyes:      Pupils: Pupils are equal, round, and reactive to light.   Neck:      Thyroid: No thyromegaly.      Vascular: No JVD.   Cardiovascular:      Rate and Rhythm: Normal rate and regular rhythm.      Heart sounds: Normal heart sounds. No murmur heard.     No friction rub. No gallop.   Pulmonary:      Effort: Pulmonary effort is normal.      Breath sounds: Normal breath sounds. No wheezing or rales.   Abdominal:      General: Bowel sounds are normal. There is no distension.      Palpations: Abdomen is soft. There is no mass.      Tenderness: There is no abdominal tenderness. There is no guarding or rebound.   Musculoskeletal:      Cervical back: Neck supple.      Comments: Mild 4/5 RLE weakness   Lymphadenopathy:      Cervical: No cervical adenopathy.   Skin:     General: Skin is warm and dry.   Neurological:      Mental Status: She is alert and oriented to person, place, and time.      Deep Tendon Reflexes: Reflexes are normal and symmetric.   Psychiatric:         Behavior: Behavior normal.         Thought Content: Thought content normal.         Judgment:  Judgment normal.         Assessment:       1. Other cerebral infarction    2. Primary hypertension    3. Hyperlipidemia, unspecified hyperlipidemia type    4. Hyperglycemia    5. Osteoporosis, unspecified osteoporosis type, unspecified pathological fracture presence    6. Urinary tract infection without hematuria, site unspecified    7. Screening mammogram, encounter for    8. Right leg weakness    9. Need for vaccination        Plan:   Other cerebral infarction  -     MRI Brain W WO Contrast; Future; Expected date: 12/27/2024    Primary hypertension  -     CBC Auto Differential; Future; Expected date: 12/27/2024  -     Comprehensive Metabolic Panel; Future; Expected date: 12/27/2024  -     TSH; Future; Expected date: 12/27/2024  -     Microalbumin/Creatinine Ratio, Urine; Future; Expected date: 12/27/2024    Hyperlipidemia, unspecified hyperlipidemia type  -     Lipid Panel; Future; Expected date: 12/27/2024    Hyperglycemia  -     Cancel: Hemoglobin A1C; Future; Expected date: 12/27/2024    Osteoporosis, unspecified osteoporosis type, unspecified pathological fracture presence  -     Vitamin D; Future; Expected date: 12/27/2024    Urinary tract infection without hematuria, site unspecified  -     Urinalysis Microscopic; Future; Expected date: 12/28/2024  -     Urinalysis; Future; Expected date: 12/27/2024  -     Urine culture; Future; Expected date: 12/27/2024    Screening mammogram, encounter for  -     Mammo Digital Screening Bilat w/ Abdoul; Future; Expected date: 06/27/2025    Right leg weakness  -     Ambulatory Referral/Consult to Physical Therapy; Future; Expected date: 01/03/2025    Need for vaccination  -     Influenza - Trivalent (Adjuvanted)

## 2024-12-30 LAB
ALBUMIN SERPL ELPH-MCNC: 4.38 G/DL (ref 3.35–5.55)
ALPHA1 GLOB SERPL ELPH-MCNC: 0.33 G/DL (ref 0.17–0.41)
ALPHA2 GLOB SERPL ELPH-MCNC: 0.95 G/DL (ref 0.43–0.99)
B-GLOBULIN SERPL ELPH-MCNC: 0.68 G/DL (ref 0.5–1.1)
GAMMA GLOB SERPL ELPH-MCNC: 1.96 G/DL (ref 0.67–1.58)
KAPPA LC SER QL IA: 0.93 MG/DL (ref 0.33–1.94)
KAPPA LC/LAMBDA SER IA: 0.17 (ref 0.26–1.65)
LAMBDA LC SER QL IA: 5.56 MG/DL (ref 0.57–2.63)
PATHOLOGIST INTERPRETATION SPE: NORMAL
PROT SERPL-MCNC: 8.3 G/DL (ref 6–8.4)

## 2025-01-08 ENCOUNTER — HOSPITAL ENCOUNTER (OUTPATIENT)
Dept: RADIOLOGY | Facility: OTHER | Age: 85
Discharge: HOME | End: 2025-01-08
Attending: INTERNAL MEDICINE
Payer: MEDICARE

## 2025-01-08 DIAGNOSIS — Z12.31 SCREENING MAMMOGRAM, ENCOUNTER FOR: ICD-10-CM

## 2025-01-08 PROCEDURE — 77067 SCR MAMMO BI INCL CAD: CPT | Mod: TC,HCNC

## 2025-01-08 PROCEDURE — 77063 BREAST TOMOSYNTHESIS BI: CPT | Mod: 26,HCNC,, | Performed by: RADIOLOGY

## 2025-01-08 PROCEDURE — 77067 SCR MAMMO BI INCL CAD: CPT | Mod: 26,HCNC,, | Performed by: RADIOLOGY

## 2025-01-14 ENCOUNTER — HOSPITAL ENCOUNTER (OUTPATIENT)
Dept: RADIOLOGY | Facility: HOSPITAL | Age: 85
Discharge: HOME | End: 2025-01-14
Attending: INTERNAL MEDICINE
Payer: MEDICARE

## 2025-01-14 DIAGNOSIS — I63.89 OTHER CEREBRAL INFARCTION: ICD-10-CM

## 2025-01-14 PROCEDURE — 70553 MRI BRAIN STEM W/O & W/DYE: CPT | Mod: 26,HCNC,, | Performed by: RADIOLOGY

## 2025-01-14 PROCEDURE — A9585 GADOBUTROL INJECTION: HCPCS | Mod: HCNC | Performed by: INTERNAL MEDICINE

## 2025-01-14 PROCEDURE — 25500020 PHARM REV CODE 255: Mod: HCNC | Performed by: INTERNAL MEDICINE

## 2025-01-14 PROCEDURE — 70553 MRI BRAIN STEM W/O & W/DYE: CPT | Mod: TC,HCNC

## 2025-01-14 RX ORDER — GADOBUTROL 604.72 MG/ML
8 INJECTION INTRAVENOUS
Status: COMPLETED | OUTPATIENT
Start: 2025-01-14 | End: 2025-01-14

## 2025-01-14 RX ADMIN — GADOBUTROL 8 ML: 604.72 INJECTION INTRAVENOUS at 11:01

## 2025-02-04 ENCOUNTER — TELEPHONE (OUTPATIENT)
Dept: OPTOMETRY | Facility: CLINIC | Age: 85
End: 2025-02-04
Payer: MEDICARE

## 2025-02-20 ENCOUNTER — TELEPHONE (OUTPATIENT)
Dept: INTERNAL MEDICINE | Facility: CLINIC | Age: 85
End: 2025-02-20
Payer: MEDICARE

## 2025-02-20 NOTE — TELEPHONE ENCOUNTER
----- Message from Jahaira sent at 2/20/2025  9:29 AM CST -----  Contact: [phone] .[email or web address] Patient is calling for Medical Advice regarding:pt is calling she needs to renew her handicap sticker Can you please fill out the form and call pt back and advise. Pt can  from the office Please call pt back and advise.How long has patient had these symptoms:Pharmacy name and phone#:Patient wants a call back or thru [facility]:callbackComments:Please advise patient replies from provider may take up to 48 hours.

## 2025-02-21 RX ORDER — TRIAMTERENE AND HYDROCHLOROTHIAZIDE 37.5; 25 MG/1; MG/1
1 CAPSULE ORAL EVERY MORNING
Qty: 90 CAPSULE | Refills: 3 | Status: SHIPPED | OUTPATIENT
Start: 2025-02-21

## 2025-02-21 NOTE — TELEPHONE ENCOUNTER
Refill Decision Note   Kelley Cruz  is requesting a refill authorization.    Brief Assessment and Rationale for Refill:   Approve       Medication Therapy Plan:         Comments:     Note composed:5:27 PM 02/21/2025

## 2025-02-21 NOTE — TELEPHONE ENCOUNTER
No care due was identified.  [facility] Embedded Care Due Messages. Reference number: [id number].   2/21/2025 12:08:04 PM CST

## 2025-02-22 DIAGNOSIS — Z00.00 ENCOUNTER FOR MEDICARE ANNUAL WELLNESS EXAM: ICD-10-CM

## 2025-03-31 ENCOUNTER — TELEPHONE (OUTPATIENT)
Dept: OPTOMETRY | Facility: CLINIC | Age: 85
End: 2025-03-31
Payer: MEDICARE

## 2025-03-31 NOTE — TELEPHONE ENCOUNTER
----- Message from Beth sent at 3/31/2025  8:32 AM CDT -----  Regarding: Appt  Contact: Pt  [phone]  Current Appt date:  03/31/25 Type of Appt:    Est Pt  Physician: Frankie Angel OD  Reason for rescheduling:  Due to weather   Caller:  Kelley  Contact Preference: [phone]

## 2025-05-14 DIAGNOSIS — Z78.0 MENOPAUSE: ICD-10-CM

## 2025-05-17 NOTE — TELEPHONE ENCOUNTER
No care due was identified.  [facility] Embedded Care Due Messages. Reference number: [id number].   5/17/2025 6:54:26 AM CDT   Cimzia Counseling:  I discussed with the patient the risks of Cimzia including but not limited to immunosuppression, allergic reactions and infections.  The patient understands that monitoring is required including a PPD at baseline and must alert us or the primary physician if symptoms of infection or other concerning signs are noted.

## 2025-05-18 RX ORDER — LOSARTAN POTASSIUM 50 MG/1
50 TABLET ORAL
Qty: 90 TABLET | Refills: 2 | Status: SHIPPED | OUTPATIENT
Start: 2025-05-18

## 2025-05-18 NOTE — TELEPHONE ENCOUNTER
Refill Decision Note   Kelley Cruz  is requesting a refill authorization.  Brief Assessment and Rationale for Refill:  Approve     Medication Therapy Plan:         Comments:     Note composed:9:17 AM 05/18/2025

## 2025-06-24 ENCOUNTER — LAB VISIT (OUTPATIENT)
Dept: LAB | Facility: HOSPITAL | Age: 85
End: 2025-06-24
Attending: INTERNAL MEDICINE
Payer: MEDICARE

## 2025-06-24 ENCOUNTER — OFFICE VISIT (OUTPATIENT)
Dept: INTERNAL MEDICINE | Facility: CLINIC | Age: 85
End: 2025-06-24
Payer: MEDICARE

## 2025-06-24 VITALS
DIASTOLIC BLOOD PRESSURE: 66 MMHG | OXYGEN SATURATION: 97 % | SYSTOLIC BLOOD PRESSURE: 134 MMHG | HEART RATE: 71 BPM | WEIGHT: 167 LBS | HEIGHT: 63 IN | BODY MASS INDEX: 29.59 KG/M2

## 2025-06-24 DIAGNOSIS — R29.898 WEAKNESS OF LOWER EXTREMITY, UNSPECIFIED LATERALITY: ICD-10-CM

## 2025-06-24 DIAGNOSIS — E83.52 HYPERCALCEMIA: ICD-10-CM

## 2025-06-24 DIAGNOSIS — I10 PRIMARY HYPERTENSION: ICD-10-CM

## 2025-06-24 DIAGNOSIS — Z23 NEED FOR PNEUMOCOCCAL VACCINATION: ICD-10-CM

## 2025-06-24 DIAGNOSIS — I10 PRIMARY HYPERTENSION: Primary | ICD-10-CM

## 2025-06-24 DIAGNOSIS — D47.2 IGM LAMBDA PARAPROTEINEMIA: ICD-10-CM

## 2025-06-24 LAB
25(OH)D3+25(OH)D2 SERPL-MCNC: 21 NG/ML (ref 30–96)
ABSOLUTE EOSINOPHIL (OHS): 0.12 K/UL
ABSOLUTE MONOCYTE (OHS): 0.76 K/UL (ref 0.3–1)
ABSOLUTE NEUTROPHIL COUNT (OHS): 4.01 K/UL (ref 1.8–7.7)
ALBUMIN SERPL BCP-MCNC: 3.9 G/DL (ref 3.5–5.2)
ALP SERPL-CCNC: 76 UNIT/L (ref 40–150)
ALT SERPL W/O P-5'-P-CCNC: 16 UNIT/L (ref 10–44)
ANION GAP (OHS): 11 MMOL/L (ref 8–16)
AST SERPL-CCNC: 19 UNIT/L (ref 11–45)
BASOPHILS # BLD AUTO: 0.06 K/UL
BASOPHILS NFR BLD AUTO: 0.9 %
BILIRUB SERPL-MCNC: 0.5 MG/DL (ref 0.1–1)
BUN SERPL-MCNC: 19 MG/DL (ref 8–23)
CALCIUM SERPL-MCNC: 10.7 MG/DL (ref 8.7–10.5)
CHLORIDE SERPL-SCNC: 102 MMOL/L (ref 95–110)
CO2 SERPL-SCNC: 28 MMOL/L (ref 23–29)
CREAT SERPL-MCNC: 1.1 MG/DL (ref 0.5–1.4)
ERYTHROCYTE [DISTWIDTH] IN BLOOD BY AUTOMATED COUNT: 13 % (ref 11.5–14.5)
GFR SERPLBLD CREATININE-BSD FMLA CKD-EPI: 50 ML/MIN/1.73/M2
GLUCOSE SERPL-MCNC: 89 MG/DL (ref 70–110)
HCT VFR BLD AUTO: 42.8 % (ref 37–48.5)
HGB BLD-MCNC: 13.5 GM/DL (ref 12–16)
IMM GRANULOCYTES # BLD AUTO: 0.01 K/UL (ref 0–0.04)
IMM GRANULOCYTES NFR BLD AUTO: 0.1 % (ref 0–0.5)
LYMPHOCYTES # BLD AUTO: 1.93 K/UL (ref 1–4.8)
MCH RBC QN AUTO: 30.5 PG (ref 27–31)
MCHC RBC AUTO-ENTMCNC: 31.5 G/DL (ref 32–36)
MCV RBC AUTO: 97 FL (ref 82–98)
NUCLEATED RBC (/100WBC) (OHS): 0 /100 WBC
PLATELET # BLD AUTO: 266 K/UL (ref 150–450)
PMV BLD AUTO: 10.3 FL (ref 9.2–12.9)
POTASSIUM SERPL-SCNC: 4.6 MMOL/L (ref 3.5–5.1)
PROT SERPL-MCNC: 7.8 GM/DL (ref 6–8.4)
PTH-INTACT SERPL-MCNC: 38.1 PG/ML (ref 9–77)
RBC # BLD AUTO: 4.43 M/UL (ref 4–5.4)
RELATIVE EOSINOPHIL (OHS): 1.7 %
RELATIVE LYMPHOCYTE (OHS): 28 % (ref 18–48)
RELATIVE MONOCYTE (OHS): 11 % (ref 4–15)
RELATIVE NEUTROPHIL (OHS): 58.3 % (ref 38–73)
SODIUM SERPL-SCNC: 141 MMOL/L (ref 136–145)
WBC # BLD AUTO: 6.89 K/UL (ref 3.9–12.7)

## 2025-06-24 PROCEDURE — 85025 COMPLETE CBC W/AUTO DIFF WBC: CPT | Mod: HCNC

## 2025-06-24 PROCEDURE — 90677 PCV20 VACCINE IM: CPT | Mod: HCNC,S$GLB,, | Performed by: INTERNAL MEDICINE

## 2025-06-24 PROCEDURE — 82306 VITAMIN D 25 HYDROXY: CPT | Mod: HCNC

## 2025-06-24 PROCEDURE — 1101F PT FALLS ASSESS-DOCD LE1/YR: CPT | Mod: CPTII,HCNC,S$GLB, | Performed by: INTERNAL MEDICINE

## 2025-06-24 PROCEDURE — 80053 COMPREHEN METABOLIC PANEL: CPT | Mod: HCNC

## 2025-06-24 PROCEDURE — 3288F FALL RISK ASSESSMENT DOCD: CPT | Mod: CPTII,HCNC,S$GLB, | Performed by: INTERNAL MEDICINE

## 2025-06-24 PROCEDURE — 99999 PR PBB SHADOW E&M-EST. PATIENT-LVL IV: CPT | Mod: PBBFAC,HCNC,, | Performed by: INTERNAL MEDICINE

## 2025-06-24 PROCEDURE — 99214 OFFICE O/P EST MOD 30 MIN: CPT | Mod: HCNC,S$GLB,, | Performed by: INTERNAL MEDICINE

## 2025-06-24 PROCEDURE — 1159F MED LIST DOCD IN RCRD: CPT | Mod: CPTII,HCNC,S$GLB, | Performed by: INTERNAL MEDICINE

## 2025-06-24 PROCEDURE — G0009 ADMIN PNEUMOCOCCAL VACCINE: HCPCS | Mod: HCNC,S$GLB,, | Performed by: INTERNAL MEDICINE

## 2025-06-24 PROCEDURE — 36415 COLL VENOUS BLD VENIPUNCTURE: CPT | Mod: HCNC,PO

## 2025-06-24 PROCEDURE — 3075F SYST BP GE 130 - 139MM HG: CPT | Mod: CPTII,HCNC,S$GLB, | Performed by: INTERNAL MEDICINE

## 2025-06-24 PROCEDURE — 83970 ASSAY OF PARATHORMONE: CPT | Mod: HCNC

## 2025-06-24 PROCEDURE — 84165 PROTEIN E-PHORESIS SERUM: CPT | Mod: HCNC,,, | Performed by: PATHOLOGY

## 2025-06-24 PROCEDURE — 1126F AMNT PAIN NOTED NONE PRSNT: CPT | Mod: CPTII,HCNC,S$GLB, | Performed by: INTERNAL MEDICINE

## 2025-06-24 PROCEDURE — 3078F DIAST BP <80 MM HG: CPT | Mod: CPTII,HCNC,S$GLB, | Performed by: INTERNAL MEDICINE

## 2025-06-24 PROCEDURE — 84165 PROTEIN E-PHORESIS SERUM: CPT | Mod: HCNC

## 2025-06-25 LAB
ALBUMIN, SPE (OHS): 4.17 G/DL (ref 3.35–5.55)
ALPHA 1 GLOB (OHS): 0.29 GM/DL (ref 0.17–0.41)
ALPHA 2 GLOB (OHS): 0.87 GM/DL (ref 0.43–0.99)
BETA GLOB (OHS): 0.64 GM/DL (ref 0.5–1.1)
GAMMA GLOBULIN (OHS): 1.84 GM/DL (ref 0.67–1.58)
PATHOLOGIST REVIEW - SPE (OHS): NORMAL
PROT SERPL-MCNC: 7.8 GM/DL (ref 6–8.4)

## 2025-06-28 ENCOUNTER — RESULTS FOLLOW-UP (OUTPATIENT)
Dept: INTERNAL MEDICINE | Facility: CLINIC | Age: 85
End: 2025-06-28

## 2025-06-28 NOTE — PROGRESS NOTES
Subjective:       Patient ID: Kelley Cruz is a 85 y.o. female.    Chief Complaint: Follow-up    Follow-up  Pertinent negatives include no abdominal pain, chest pain (arm pain or jaw pain), headaches, nausea or vomiting.    Pt is feeling well - No CP or SOB. No visual issues - leg appears to be doing better - reviewed MRI findings of no stroke.  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   Review of Systems   Respiratory:  Negative for shortness of breath (PND or orthopnea).    Cardiovascular:  Negative for chest pain (arm pain or jaw pain).   Gastrointestinal:  Negative for abdominal pain, diarrhea, nausea and vomiting.   Genitourinary:  Negative for dysuria.   Neurological:  Negative for seizures, syncope and headaches.       Objective:      Physical Exam  Constitutional:       General: She is not in acute distress.     Appearance: She is well-developed.   HENT:      Head: Normocephalic.   Eyes:      Pupils: Pupils are equal, round, and reactive to light.   Neck:      Thyroid: No thyromegaly.      Vascular: No JVD.   Cardiovascular:      Rate and Rhythm: Normal rate and regular rhythm.      Heart sounds: Normal heart sounds. No murmur heard.     No friction rub. No gallop.   Pulmonary:      Effort: Pulmonary effort is normal.      Breath sounds: Normal breath sounds. No wheezing or rales.   Abdominal:      General: Bowel sounds are normal. There is no distension.      Palpations: Abdomen is soft. There is no mass.      Tenderness: There is no abdominal tenderness. There is no guarding or rebound.   Musculoskeletal:      Cervical back: Neck supple.   Lymphadenopathy:      Cervical: No cervical adenopathy.   Skin:     General: Skin is warm and dry.   Neurological:      Mental Status: She is alert and oriented to person, place, and time.      Deep Tendon Reflexes: Reflexes are normal and symmetric.   Psychiatric:         Behavior: Behavior normal.         Thought  Content: Thought content normal.         Judgment: Judgment normal.         Assessment:       1. Primary hypertension    2. Hypercalcemia    3. IgM lambda paraproteinemia    4. Weakness of lower extremity, unspecified laterality    5. Need for pneumococcal vaccination        Plan:   Primary hypertension  -     CBC Auto Differential; Future; Expected date: 06/24/2025  -     Comprehensive Metabolic Panel; Future; Expected date: 06/24/2025    Hypercalcemia  -     Vitamin D; Future; Expected date: 06/24/2025  -     PTH, Intact; Future; Expected date: 06/24/2025    IgM lambda paraproteinemia  -     Ambulatory referral/consult to Hematology / Oncology; Future; Expected date: 07/01/2025  -     Protein Electrophoresis, Serum; Future; Expected date: 06/24/2025    Weakness of lower extremity, unspecified laterality  -     Ambulatory Referral/Consult to Physical Therapy    Need for pneumococcal vaccination  -     pneumoc 20-estefania conj-dip cr(PF) (PREVNAR-20 (PF)) injection Syrg 0.5 mL

## 2025-07-17 ENCOUNTER — CLINICAL SUPPORT (OUTPATIENT)
Dept: REHABILITATION | Facility: HOSPITAL | Age: 85
End: 2025-07-17
Attending: INTERNAL MEDICINE
Payer: MEDICARE

## 2025-07-17 DIAGNOSIS — R29.898 DECREASED STRENGTH OF LOWER EXTREMITY: ICD-10-CM

## 2025-07-17 DIAGNOSIS — Z74.09 IMPAIRED FUNCTIONAL MOBILITY, BALANCE, GAIT, AND ENDURANCE: Primary | ICD-10-CM

## 2025-07-17 PROCEDURE — 97161 PT EVAL LOW COMPLEX 20 MIN: CPT | Mod: HCNC,PO

## 2025-07-17 PROCEDURE — 97530 THERAPEUTIC ACTIVITIES: CPT | Mod: HCNC,PO

## 2025-07-18 PROBLEM — Z74.09 IMPAIRED FUNCTIONAL MOBILITY, BALANCE, GAIT, AND ENDURANCE: Status: ACTIVE | Noted: 2025-07-18

## 2025-07-21 NOTE — PROGRESS NOTES
Outpatient Rehab    Physical Therapy Evaluation    Patient Name: Kelley Cruz  MRN: [id number]  YOB: 1940  Encounter Date: 7/17/2025    Therapy Diagnosis:   Encounter Diagnoses   Name Primary?    Impaired functional mobility, balance, gait, and endurance Yes    Decreased strength of lower extremity      Physician: Yina Roth MD    Physician Orders: Eval and Treat  Medical Diagnosis: Weakness of lower extremity, unspecified laterality  Surgical Diagnosis: Not applicable for this Episode   Surgical Date: Not applicable for this Episode  Days Since Last Surgery: Not applicable for this Episode    Visit # / Visits Authorized:  1 / 1  Insurance Authorization Period: 6/24/2025 to 6/24/2026  Date of Evaluation: 7/17/2025  Plan of Care Certification: 7/17/2025 to 9/11/2025     Time In: 0810   Time Out: 0900  Total Time (in minutes): 50   Total Billable Time (in minutes): 50    Intake Outcome Measure for FOTO Survey    Therapist reviewed FOTO scores for Kelley Cruz on 7/17/2025.   FOTO report - see Media section or FOTO account episode details.     Intake Score (%): Not applicable for this Episode    Precautions:       Subjective   History of Present Illness  Kelley is a 85 y.o. female who reports to physical therapy with a chief concern of Gait Deficits.     The patient reports a medical diagnosis of R29.898 (ICD-10-CM) - Weakness of lower extremity, unspecified laterality.            History of Present Condition/Illness: She notes that others having noticed that she is dragging her right foot when she walks. She is not really sure if she feels any particular weakness in her legs. She notes that she has stairs in her home that she takes routinely. She states that she used to walk a lot but does not do not as much anymore because she is worried about tripping and falling. She does not ambulate with any assistive device at this time.     Pain  No Pain Reported: Yes                  Treatment History  Treatments  Previously Received Treatments: No    Living Arrangements  Living Situation  Housing: Home independently  Living Arrangements: Alone    Home Setup  Type of Structure: House  Home Access: Level entry  Number of Levels in Home: Two level  Access to Alternate Level of Home: Stairs with rails  Alternate Level Stairs - Number of Steps: 13        Employment  Employment Status: Retired          Past Medical History/Physical Systems Review:   Kelley Cruz  has a past medical history of Cataract, Hypertension, Keloid cicatrix, and Osteoporosis.    Kelley Cruz  has a past surgical history that includes Hysterectomy and Open reduction and internal fixation (ORIF) of fracture of distal radius (Right, 1/23/2019).    Kleley has a current medication list which includes the following prescription(s): ascorbic acid (vitamin c), losartan, multivitamin, and triamterene-hydrochlorothiazide 37.5-25 mg.    Review of patient's allergies indicates:   Allergen Reactions    Codeine      Other reaction(s): Headache    Pcn  [penicillins]      Other reaction(s): Nausea        Objective            Hip Strength - Planes of Motion   Right Strength Right Pain Left Strength Left  Pain   Flexion (L2) 3+   3+     Extension 3-   3+     ABduction 3-   3+     ADduction           Internal Rotation 4-   4     External Rotation 3+   3+         Knee Strength   Right Strength Right Pain Left Strength Left  Pain   Flexion (S2) 4   4+     Prone Flexion           Extension (L3) 5   5            Ankle/Foot Strength - Planes of Motion   Right Strength Right Pain Left Strength Left  Pain   Dorsiflexion (L4) 4   4+     Plantar Flexion (S1) 5   5     Inversion           Eversion           Great Toe Flexion           Great Toe Extension (L5)           Lesser Toes Flexion           Lesser Toes Extension           Plantarflexion measured in sitting         Timed Up & Go (TUG)  Time: 9.7 seconds     An older adult  "who takes >=12 seconds to complete the TUG is at risk for falling.       Sit to Stand Testing      The patient completed 11 repetitions of a sit to stand transfer in 30 seconds. no use of UE assist         Gait Analysis  Hip Observations During Gait  Right: Hip Trendelenburg  Bilateral: Decreased Hip Extension  Knee Observations During Gait  Bilateral: Knee Decreased Extension in Midstance  Gait Analysis Details  Decreased ankle dorsiflexion in stance; intermittent dragging of right foot         Treatment:  Therapeutic Activity  TA 1: Supine SLRs: 10x ea  TA 2: Bridges: 15x  TA 3: Sidelying clams: 10x ea    Time Entry(in minutes):  PT Evaluation (Low) Time Entry: 35  Therapeutic Activity Time Entry: 15    Assessment & Plan   Assessment  Kelley presents with a condition of Low complexity.   Presentation of Symptoms: Stable  Will Comorbidities Impact Care: Yes  HTN, Hyperlipidemia, Osteoporosis    Functional Limitations: Activity tolerance, Participating in leisure activities, Ambulating on uneven surfaces, Decreased ambulation distance/endurance, Functional mobility, Gait limitations, Getting off the floor, Transfers, Maintaining balance  Impairments: Impaired physical strength, Abnormal gait, Activity intolerance, Lack of appropriate home exercise program    Patient Goal for Therapy (PT): to improve her gait and LE strength  Prognosis: Good  Assessment Details: Ms. Benson presents to PT evaluation with chief complaints of gait deficits. She demonstrates decreased lower extremity strength, slight deficti in 30" sit<>stand score, and gait deficits. These limitations are affecting her performance of activities of daily living and leisure activities. Plan to work to improve her functional lower extremity endurance/strength and gait mechanics.    Plan  From a physical therapy perspective, the patient would benefit from: Skilled Rehab Services    Planned therapy interventions include: Therapeutic exercise, Therapeutic " "activities, Neuromuscular re-education, Manual therapy, ADLs/IADLs, and Gait training.    Planned modalities to include: Electrical stimulation - attended, Electrical stimulation - passive/unattended, Thermotherapy (hot pack), and Cryotherapy (cold pack).        Visit Frequency: 1 times Per Week for 8 Weeks.       This plan was discussed with Patient.   Discussion participants: Agreed Upon Plan of Care  Plan details: Improve her functional lower extremity endurance/strength and gait mechanics          The patient's spiritual, cultural, and educational needs were considered, and the patient is agreeable to the plan of care and goals.     Education  Education was done with Patient. The patient's learning style includes Demonstration, Listening, and Pictures/video. The patient Demonstrates understanding and Verbalizes understanding.         Diagnosis and prognosis, Plan of care, and Home exercise program       Goals:   Active       Functional outcome       Patient will show a significant change in FOTO patient-reported outcome tool to demonstrate subjective improvement       Start:  07/17/25    Expected End:  09/11/25            Patient stated goal: to improve her gait and LE strength        Start:  07/17/25    Expected End:  09/11/25            Patient will demonstrate independence in home program for support of progression       Start:  07/17/25    Expected End:  09/11/25               Functional test       Patient will perform >/= 12 repetitions during performance of 30" sit<>stand test to demonstrate improved functional LE strength       Start:  07/17/25    Expected End:  09/11/25               Strength       Patient will achieve bilateral hip strength of >/= 4-/5       Start:  07/17/25    Expected End:  09/11/25                Kolby Nick, PT, DPT, OCS    "

## 2025-07-24 ENCOUNTER — CLINICAL SUPPORT (OUTPATIENT)
Dept: REHABILITATION | Facility: HOSPITAL | Age: 85
End: 2025-07-24
Payer: MEDICARE

## 2025-07-24 DIAGNOSIS — Z74.09 IMPAIRED FUNCTIONAL MOBILITY, BALANCE, GAIT, AND ENDURANCE: Primary | ICD-10-CM

## 2025-07-24 PROCEDURE — 97112 NEUROMUSCULAR REEDUCATION: CPT | Mod: HCNC,PO,CQ

## 2025-07-24 PROCEDURE — 97110 THERAPEUTIC EXERCISES: CPT | Mod: HCNC,PO,CQ

## 2025-07-24 PROCEDURE — 97530 THERAPEUTIC ACTIVITIES: CPT | Mod: HCNC,PO,CQ

## 2025-07-24 NOTE — PROGRESS NOTES
Outpatient Rehab    Physical Therapy Visit    Patient Name: Kelley Cruz  MRN: [id number]  YOB: 1940  Encounter Date: 7/24/2025    Therapy Diagnosis:   Encounter Diagnosis   Name Primary?    Impaired functional mobility, balance, gait, and endurance Yes     Physician: Yina Roth MD    Physician Orders: Eval and Treat  Medical Diagnosis: Weakness of lower extremity, unspecified laterality  Surgical Diagnosis: Not applicable for this Episode   Surgical Date: Not applicable for this Episode  Days Since Last Surgery: Not applicable for this Episode    Visit # / Visits Authorized:  1 / 10  Insurance Authorization Period: 7/16/2025 to 10/10/2025  Date of Evaluation: 7/17/2025  Plan of Care Certification: 7/17/2025 to 9/11/2025      PT/PTA: PTA   Number of PTA visits since last PT visit:1  Time In: 0900   Time Out: 0955  Total Time (in minutes): 55   Total Billable Time (in minutes): 55    FOTO:  Intake Score (%): 51  Survey Score 2 (%): Not applicable for this Episode  Survey Score 3 (%): Not applicable for this Episode    Precautions:         Subjective   Pt reports no complaints of pain but states she just notices she drags her R foot.  Pain reported as 0/10.      Objective            Treatment:  Therapeutic Exercise  TE 1: Nustep: 10' lvl 1  TE 2: Ankle resisted DF: YTB 30x (right foot)  Balance/Neuromuscular Re-Education  NMR 1: Bridges: 3 x 10  NMR 2: Sidelying clams: 3 x 10 elina  NMR 3: Supine SLRs: 2 x 10 elina  NMR 4: LAQ: 3 x 10 elina  NMR 5: Standing hip abduction/extension: 15x ea elina  NMR 6: Standing marches:30x (cuing for high steps to prepare for hurdles in future)  Therapeutic Activity  TA 1: lateral walks at mat: YTB 3 laps  TA 2: Shuttle DL: 2B - 2 x 10  TA 3: Shuttle SL: 1B - 2 x 10    Time Entry(in minutes):  Neuromuscular Re-Education Time Entry: 27  Therapeutic Activity Time Entry: 15  Therapeutic Exercise Time Entry: 13    Assessment & Plan   Assessment: Ms. Benson  "presents to clinic for 1st treatment session after initial evaluation. She reports no complaints at this time. Tx session focuses on LE strengthening and improving gait/dragging of R foot. Pt required verbal cues throughout session regarding form to improve NM activation. She had good tolerance to all activities performed with no complaints. Will continue to progress as tolerated.   Evaluation/Treatment Tolerance: Patient tolerated treatment well    The patient will continue to benefit from skilled outpatient physical therapy in order to address the deficits listed in the problem list on the initial evaluation, provide patient and family education, and maximize the patients level of independence in the home and community environments.     The patient's spiritual, cultural, and educational needs were considered, and the patient is agreeable to the plan of care and goals.           Plan: to improve her gait and LE strength    Goals:   Active       Functional outcome       Patient will show a significant change in FOTO patient-reported outcome tool to demonstrate subjective improvement (Ongoing)       Start:  07/17/25    Expected End:  09/11/25            Patient stated goal: to improve her gait and LE strength  (Ongoing)       Start:  07/17/25    Expected End:  09/11/25            Patient will demonstrate independence in home program for support of progression (Ongoing)       Start:  07/17/25    Expected End:  09/11/25               Functional test       Patient will perform >/= 12 repetitions during performance of 30" sit<>stand test to demonstrate improved functional LE strength (Ongoing)       Start:  07/17/25    Expected End:  09/11/25               Strength       Patient will achieve bilateral hip strength of >/= 4-/5 (Ongoing)       Start:  07/17/25    Expected End:  09/11/25                Nelda Suarez PTA    "

## 2025-08-12 ENCOUNTER — OFFICE VISIT (OUTPATIENT)
Dept: INTERNAL MEDICINE | Facility: CLINIC | Age: 85
End: 2025-08-12
Payer: MEDICARE

## 2025-08-12 VITALS
OXYGEN SATURATION: 97 % | HEART RATE: 71 BPM | DIASTOLIC BLOOD PRESSURE: 58 MMHG | RESPIRATION RATE: 14 BRPM | TEMPERATURE: 97 F | WEIGHT: 172.75 LBS | HEIGHT: 63 IN | BODY MASS INDEX: 30.61 KG/M2 | SYSTOLIC BLOOD PRESSURE: 116 MMHG

## 2025-08-12 DIAGNOSIS — I10 PRIMARY HYPERTENSION: ICD-10-CM

## 2025-08-12 DIAGNOSIS — J06.9 UPPER RESPIRATORY TRACT INFECTION, UNSPECIFIED TYPE: Primary | ICD-10-CM

## 2025-08-12 PROCEDURE — 1160F RVW MEDS BY RX/DR IN RCRD: CPT | Mod: CPTII,HCNC,S$GLB,

## 2025-08-12 PROCEDURE — 3288F FALL RISK ASSESSMENT DOCD: CPT | Mod: CPTII,HCNC,S$GLB,

## 2025-08-12 PROCEDURE — G2211 COMPLEX E/M VISIT ADD ON: HCPCS | Mod: HCNC,S$GLB,,

## 2025-08-12 PROCEDURE — 87632 RESP VIRUS 6-11 TARGETS: CPT | Mod: HCNC

## 2025-08-12 PROCEDURE — 3074F SYST BP LT 130 MM HG: CPT | Mod: CPTII,HCNC,S$GLB,

## 2025-08-12 PROCEDURE — 99999 PR PBB SHADOW E&M-EST. PATIENT-LVL IV: CPT | Mod: PBBFAC,HCNC,,

## 2025-08-12 PROCEDURE — 1125F AMNT PAIN NOTED PAIN PRSNT: CPT | Mod: CPTII,HCNC,S$GLB,

## 2025-08-12 PROCEDURE — 1101F PT FALLS ASSESS-DOCD LE1/YR: CPT | Mod: CPTII,HCNC,S$GLB,

## 2025-08-12 PROCEDURE — 1159F MED LIST DOCD IN RCRD: CPT | Mod: CPTII,HCNC,S$GLB,

## 2025-08-12 PROCEDURE — 99213 OFFICE O/P EST LOW 20 MIN: CPT | Mod: HCNC,S$GLB,,

## 2025-08-12 PROCEDURE — 3078F DIAST BP <80 MM HG: CPT | Mod: CPTII,HCNC,S$GLB,

## 2025-08-12 RX ORDER — FLUTICASONE PROPIONATE 50 MCG
1 SPRAY, SUSPENSION (ML) NASAL DAILY
Qty: 16 G | Refills: 0 | Status: SHIPPED | OUTPATIENT
Start: 2025-08-12

## 2025-08-12 RX ORDER — BENZONATATE 200 MG/1
200 CAPSULE ORAL 3 TIMES DAILY PRN
Qty: 30 CAPSULE | Refills: 0 | Status: SHIPPED | OUTPATIENT
Start: 2025-08-12 | End: 2025-08-22

## 2025-08-12 RX ORDER — LEVOCETIRIZINE DIHYDROCHLORIDE 5 MG/1
5 TABLET, FILM COATED ORAL NIGHTLY
Qty: 30 TABLET | Refills: 0 | Status: SHIPPED | OUTPATIENT
Start: 2025-08-12

## 2025-08-13 ENCOUNTER — TELEPHONE (OUTPATIENT)
Dept: INTERNAL MEDICINE | Facility: CLINIC | Age: 85
End: 2025-08-13
Payer: MEDICARE

## 2025-09-04 DIAGNOSIS — J06.9 UPPER RESPIRATORY TRACT INFECTION, UNSPECIFIED TYPE: ICD-10-CM

## 2025-09-04 RX ORDER — LEVOCETIRIZINE DIHYDROCHLORIDE 5 MG/1
5 TABLET, FILM COATED ORAL NIGHTLY
Qty: 90 TABLET | Refills: 3 | Status: SHIPPED | OUTPATIENT
Start: 2025-09-04

## (undated) DEVICE — NDL N SERIES MICRO-DISSECTION

## (undated) DEVICE — TOURNIQUET SB QC DP 18X4IN

## (undated) DEVICE — DRAPE STERI-DRAPE 1000 17X11IN

## (undated) DEVICE — Device

## (undated) DEVICE — SEE MEDLINE ITEM 152515

## (undated) DEVICE — BANDAGE CONFORM 3IN STRL

## (undated) DEVICE — SEE MEDLINE ITEM 157131

## (undated) DEVICE — GUIDEWIRE ORTHO .054 X 6 IN
Type: IMPLANTABLE DEVICE | Site: WRIST | Status: NON-FUNCTIONAL
Removed: 2019-01-23

## (undated) DEVICE — DRESSING N ADH OIL EMUL 3X8

## (undated) DEVICE — BIT DRILL QUICK RELEASE 2.0MM

## (undated) DEVICE — DRESSING N ADH OIL EMUL 3X3

## (undated) DEVICE — IMMOBILIZER SHOULDER RAINBOW M

## (undated) DEVICE — DRESSING GAUZE 6PLY 4X4

## (undated) DEVICE — GAUZE SPONGE 4X4 12PLY

## (undated) DEVICE — PADDING CAST 4IN SPECIALIST

## (undated) DEVICE — BANDAGE ELASTIC ACE 2IN 10/CA